# Patient Record
Sex: FEMALE | Race: WHITE | NOT HISPANIC OR LATINO | Employment: OTHER | URBAN - METROPOLITAN AREA
[De-identification: names, ages, dates, MRNs, and addresses within clinical notes are randomized per-mention and may not be internally consistent; named-entity substitution may affect disease eponyms.]

---

## 2018-10-30 ENCOUNTER — HOSPITAL ENCOUNTER (OUTPATIENT)
Dept: OTHER | Facility: HOSPITAL | Age: 73
Discharge: HOME | End: 2018-10-30
Attending: PODIATRIST | Admitting: PODIATRIST

## 2018-10-30 LAB
ANION GAP SERPL CALC-SCNC: 13.8 MMOL/L (ref 10–20)
BASOPHILS # BLD AUTO: 0 10*3/UL (ref 0–0.2)
BASOPHILS NFR BLD AUTO: 0 % (ref 0–2)
BUN SERPL-MCNC: 24 MG/DL (ref 8–20)
BUN/CREAT SERPL: 24 (ref 5.4–26.2)
CALCIUM SERPL-MCNC: 9.4 MG/DL (ref 8.9–10.3)
CHLORIDE SERPL-SCNC: 103 MMOL/L (ref 101–111)
CONV CO2: 29 MMOL/L (ref 22–32)
CREAT UR-MCNC: 1 MG/DL (ref 0.4–1)
DIFFERENTIAL METHOD BLD: (no result)
EOSINOPHIL # BLD AUTO: 0 10*3/UL (ref 0–0.3)
EOSINOPHIL # BLD AUTO: 1 % (ref 0–3)
ERYTHROCYTE [DISTWIDTH] IN BLOOD BY AUTOMATED COUNT: 13.5 % (ref 11.5–14.5)
GLUCOSE SERPL-MCNC: 105 MG/DL (ref 65–99)
HCT VFR BLD AUTO: 39.7 % (ref 35–49)
HGB BLD-MCNC: 13.2 G/DL (ref 12–15)
LYMPHOCYTES # BLD AUTO: 1.6 10*3/UL (ref 0.8–4.8)
LYMPHOCYTES NFR BLD AUTO: 17 % (ref 18–42)
MCH RBC QN AUTO: 27.2 PG (ref 26–32)
MCHC RBC AUTO-ENTMCNC: 33.3 G/DL (ref 32–36)
MCV RBC AUTO: 81.8 FL (ref 80–94)
MONOCYTES # BLD AUTO: 0.7 10*3/UL (ref 0.1–1.3)
MONOCYTES NFR BLD AUTO: 7 % (ref 2–11)
NEUTROPHILS # BLD AUTO: 7.4 10*3/UL (ref 2.3–8.6)
NEUTROPHILS NFR BLD AUTO: 75 % (ref 50–75)
NRBC BLD AUTO-RTO: 0 /100{WBCS}
NRBC/RBC NFR BLD MANUAL: 0 10*3/UL
PLATELET # BLD AUTO: 298 10*3/UL (ref 150–450)
PMV BLD AUTO: 8.2 FL (ref 7.4–10.4)
POTASSIUM SERPL-SCNC: 3.8 MMOL/L (ref 3.6–5.1)
RBC # BLD AUTO: 4.86 10*6/UL (ref 4–5.4)
SODIUM SERPL-SCNC: 142 MMOL/L (ref 136–144)
WBC # BLD AUTO: 9.7 10*3/UL (ref 4.5–11.5)

## 2019-05-10 ENCOUNTER — HOSPITAL ENCOUNTER (OUTPATIENT)
Dept: PREADMISSION TESTING | Facility: HOSPITAL | Age: 74
Discharge: HOME | End: 2019-05-10
Attending: PODIATRIST | Admitting: PODIATRIST

## 2019-05-10 LAB
ANION GAP SERPL CALC-SCNC: 17 MMOL/L (ref 10–20)
BASOPHILS # BLD AUTO: 0 10*3/UL (ref 0–0.2)
BASOPHILS NFR BLD AUTO: 1 % (ref 0–2)
BUN SERPL-MCNC: 17 MG/DL (ref 8–20)
BUN/CREAT SERPL: 18.9 (ref 5.4–26.2)
CALCIUM SERPL-MCNC: 9.4 MG/DL (ref 8.9–10.3)
CHLORIDE SERPL-SCNC: 99 MMOL/L (ref 101–111)
CONV CO2: 27 MMOL/L (ref 22–32)
CREAT UR-MCNC: 0.9 MG/DL (ref 0.4–1)
DIFFERENTIAL METHOD BLD: (no result)
EOSINOPHIL # BLD AUTO: 0.1 10*3/UL (ref 0–0.3)
EOSINOPHIL # BLD AUTO: 2 % (ref 0–3)
ERYTHROCYTE [DISTWIDTH] IN BLOOD BY AUTOMATED COUNT: 13.7 % (ref 11.5–14.5)
GLUCOSE SERPL-MCNC: 96 MG/DL (ref 65–99)
HCT VFR BLD AUTO: 38.3 % (ref 35–49)
HGB BLD-MCNC: 12.8 G/DL (ref 12–15)
LYMPHOCYTES # BLD AUTO: 2.1 10*3/UL (ref 0.8–4.8)
LYMPHOCYTES NFR BLD AUTO: 36 % (ref 18–42)
MCH RBC QN AUTO: 27.9 PG (ref 26–32)
MCHC RBC AUTO-ENTMCNC: 33.3 G/DL (ref 32–36)
MCV RBC AUTO: 83.6 FL (ref 80–94)
MONOCYTES # BLD AUTO: 0.4 10*3/UL (ref 0.1–1.3)
MONOCYTES NFR BLD AUTO: 7 % (ref 2–11)
NEUTROPHILS # BLD AUTO: 3.2 10*3/UL (ref 2.3–8.6)
NEUTROPHILS NFR BLD AUTO: 54 % (ref 50–75)
NRBC BLD AUTO-RTO: 0 /100{WBCS}
NRBC/RBC NFR BLD MANUAL: 0 10*3/UL
PLATELET # BLD AUTO: 234 10*3/UL (ref 150–450)
PMV BLD AUTO: 7.7 FL (ref 7.4–10.4)
POTASSIUM SERPL-SCNC: 4 MMOL/L (ref 3.6–5.1)
RBC # BLD AUTO: 4.58 10*6/UL (ref 4–5.4)
SODIUM SERPL-SCNC: 139 MMOL/L (ref 136–144)
WBC # BLD AUTO: 5.8 10*3/UL (ref 4.5–11.5)

## 2019-09-06 ENCOUNTER — TRANSCRIBE ORDERS (OUTPATIENT)
Dept: ADMINISTRATIVE | Facility: HOSPITAL | Age: 74
End: 2019-09-06

## 2019-09-06 DIAGNOSIS — I70.203 STENOSIS OF ARTERY OF BOTH LOWER EXTREMITIES (HCC): Primary | ICD-10-CM

## 2019-09-10 ENCOUNTER — HOSPITAL ENCOUNTER (OUTPATIENT)
Dept: CARDIOLOGY | Facility: HOSPITAL | Age: 74
Discharge: HOME | End: 2019-09-10
Admitting: PODIATRIST

## 2019-09-10 DIAGNOSIS — I70.203 STENOSIS OF ARTERY OF BOTH LOWER EXTREMITIES (HCC): ICD-10-CM

## 2019-09-10 LAB
BH CV LOWER ARTERIAL LEFT ABI RATIO: 1.18
BH CV LOWER ARTERIAL LEFT DORSALIS PEDIS SYS MAX: 189 MMHG
BH CV LOWER ARTERIAL LEFT GREAT TOE SYS MAX: 112 MMHG
BH CV LOWER ARTERIAL LEFT POST TIBIAL SYS MAX: 199 MMHG
BH CV LOWER ARTERIAL LEFT TBI RATIO: 0.66
BH CV LOWER ARTERIAL RIGHT ABI RATIO: 1.19
BH CV LOWER ARTERIAL RIGHT DORSALIS PEDIS SYS MAX: 192 MMHG
BH CV LOWER ARTERIAL RIGHT GREAT TOE SYS MAX: 101 MMHG
BH CV LOWER ARTERIAL RIGHT POST TIBIAL SYS MAX: 201 MMHG
BH CV LOWER ARTERIAL RIGHT TBI RATIO: 0.6
UPPER ARTERIAL LEFT ARM BRACHIAL SYS MAX: 169 MMHG
UPPER ARTERIAL RIGHT ARM BRACHIAL SYS MAX: 162 MMHG

## 2019-09-10 PROCEDURE — 93922 UPR/L XTREMITY ART 2 LEVELS: CPT

## 2019-09-16 ENCOUNTER — LAB (OUTPATIENT)
Dept: LAB | Facility: HOSPITAL | Age: 74
End: 2019-09-16

## 2019-09-16 ENCOUNTER — TRANSCRIBE ORDERS (OUTPATIENT)
Dept: ADMINISTRATIVE | Facility: HOSPITAL | Age: 74
End: 2019-09-16

## 2019-09-16 DIAGNOSIS — I10 HYPERTENSION, UNSPECIFIED TYPE: ICD-10-CM

## 2019-09-16 DIAGNOSIS — Z01.818 PRE-OP TESTING: ICD-10-CM

## 2019-09-16 DIAGNOSIS — I10 HYPERTENSION, UNSPECIFIED TYPE: Primary | ICD-10-CM

## 2019-09-16 LAB
ANION GAP SERPL CALCULATED.3IONS-SCNC: 16.1 MMOL/L (ref 5–15)
BASOPHILS # BLD AUTO: 0 10*3/MM3 (ref 0–0.2)
BASOPHILS NFR BLD AUTO: 0.8 % (ref 0–1.5)
BUN BLD-MCNC: 29 MG/DL (ref 8–20)
BUN/CREAT SERPL: 26.4 (ref 5.4–26.2)
CALCIUM SPEC-SCNC: 9.4 MG/DL (ref 8.9–10.3)
CHLORIDE SERPL-SCNC: 99 MMOL/L (ref 101–111)
CO2 SERPL-SCNC: 29 MMOL/L (ref 22–32)
CREAT BLD-MCNC: 1.1 MG/DL (ref 0.4–1)
DEPRECATED RDW RBC AUTO: 39.8 FL (ref 37–54)
EOSINOPHIL # BLD AUTO: 0 10*3/MM3 (ref 0–0.4)
EOSINOPHIL NFR BLD AUTO: 0.9 % (ref 0.3–6.2)
ERYTHROCYTE [DISTWIDTH] IN BLOOD BY AUTOMATED COUNT: 13.6 % (ref 12.3–15.4)
GFR SERPL CREATININE-BSD FRML MDRD: 49 ML/MIN/1.73
GLUCOSE BLD-MCNC: 105 MG/DL (ref 65–99)
HCT VFR BLD AUTO: 37.6 % (ref 34–46.6)
HGB BLD-MCNC: 12.9 G/DL (ref 12–15.9)
LYMPHOCYTES # BLD AUTO: 1.9 10*3/MM3 (ref 0.7–3.1)
LYMPHOCYTES NFR BLD AUTO: 38.3 % (ref 19.6–45.3)
MCH RBC QN AUTO: 29.2 PG (ref 26.6–33)
MCHC RBC AUTO-ENTMCNC: 34.3 G/DL (ref 31.5–35.7)
MCV RBC AUTO: 85.1 FL (ref 79–97)
MONOCYTES # BLD AUTO: 0.4 10*3/MM3 (ref 0.1–0.9)
MONOCYTES NFR BLD AUTO: 7.8 % (ref 5–12)
NEUTROPHILS # BLD AUTO: 2.7 10*3/MM3 (ref 1.7–7)
NEUTROPHILS NFR BLD AUTO: 52.2 % (ref 42.7–76)
NRBC BLD AUTO-RTO: 0.1 /100 WBC (ref 0–0.2)
PLATELET # BLD AUTO: 240 10*3/MM3 (ref 140–450)
PMV BLD AUTO: 7.1 FL (ref 6–12)
POTASSIUM BLD-SCNC: 4.1 MMOL/L (ref 3.6–5.1)
RBC # BLD AUTO: 4.42 10*6/MM3 (ref 3.77–5.28)
SODIUM BLD-SCNC: 140 MMOL/L (ref 136–144)
WBC NRBC COR # BLD: 5.1 10*3/MM3 (ref 3.4–10.8)

## 2019-09-16 PROCEDURE — 85025 COMPLETE CBC W/AUTO DIFF WBC: CPT

## 2019-09-16 PROCEDURE — 80048 BASIC METABOLIC PNL TOTAL CA: CPT

## 2019-09-16 PROCEDURE — 36415 COLL VENOUS BLD VENIPUNCTURE: CPT

## 2019-09-17 ENCOUNTER — TRANSCRIBE ORDERS (OUTPATIENT)
Dept: ADMINISTRATIVE | Facility: HOSPITAL | Age: 74
End: 2019-09-17

## 2019-09-17 ENCOUNTER — HOSPITAL ENCOUNTER (OUTPATIENT)
Dept: CARDIOLOGY | Facility: HOSPITAL | Age: 74
Discharge: HOME | End: 2019-09-17
Admitting: PODIATRIST

## 2019-09-17 DIAGNOSIS — I10 ESSENTIAL HYPERTENSION, BENIGN: Primary | ICD-10-CM

## 2019-09-17 DIAGNOSIS — I10 ESSENTIAL HYPERTENSION, BENIGN: ICD-10-CM

## 2019-09-17 PROCEDURE — 93005 ELECTROCARDIOGRAM TRACING: CPT | Performed by: PODIATRIST

## 2019-09-22 PROCEDURE — 93010 ELECTROCARDIOGRAM REPORT: CPT | Performed by: INTERNAL MEDICINE

## 2021-11-26 ENCOUNTER — APPOINTMENT (OUTPATIENT)
Dept: GENERAL RADIOLOGY | Facility: HOSPITAL | Age: 76
End: 2021-11-26

## 2021-11-26 ENCOUNTER — APPOINTMENT (OUTPATIENT)
Dept: CT IMAGING | Facility: HOSPITAL | Age: 76
End: 2021-11-26

## 2021-11-26 ENCOUNTER — HOSPITAL ENCOUNTER (EMERGENCY)
Facility: HOSPITAL | Age: 76
Discharge: HOME | End: 2021-11-26
Attending: EMERGENCY MEDICINE | Admitting: EMERGENCY MEDICINE

## 2021-11-26 VITALS
HEART RATE: 77 BPM | HEIGHT: 64 IN | RESPIRATION RATE: 18 BRPM | SYSTOLIC BLOOD PRESSURE: 181 MMHG | BODY MASS INDEX: 17.35 KG/M2 | WEIGHT: 101.63 LBS | OXYGEN SATURATION: 97 % | DIASTOLIC BLOOD PRESSURE: 78 MMHG | TEMPERATURE: 98.4 F

## 2021-11-26 DIAGNOSIS — S20.229A CONTUSION OF BACK, UNSPECIFIED LATERALITY, INITIAL ENCOUNTER: ICD-10-CM

## 2021-11-26 DIAGNOSIS — S00.03XA CONTUSION OF SCALP, INITIAL ENCOUNTER: ICD-10-CM

## 2021-11-26 DIAGNOSIS — E86.0 DEHYDRATION: ICD-10-CM
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
## 2021-11-26 LAB
ALBUMIN SERPL-MCNC: 4.2 G/DL (ref 3.5–5.2)
ALBUMIN/GLOB SERPL: 1.7 G/DL
ALP SERPL-CCNC: 52 U/L (ref 39–117)
ALT SERPL W P-5'-P-CCNC: 11 U/L (ref 1–33)
ANION GAP SERPL CALCULATED.3IONS-SCNC: 10 MMOL/L (ref 5–15)
AST SERPL-CCNC: 22 U/L (ref 1–32)
BASOPHILS # BLD AUTO: 0 10*3/MM3 (ref 0–0.2)
BASOPHILS NFR BLD AUTO: 0.2 % (ref 0–1.5)
BILIRUB SERPL-MCNC: 0.3 MG/DL (ref 0–1.2)
BILIRUB UR QL STRIP: NEGATIVE
BUN SERPL-MCNC: 32 MG/DL (ref 8–23)
BUN/CREAT SERPL: 30.5 (ref 7–25)
CALCIUM SPEC-SCNC: 8.5 MG/DL (ref 8.6–10.5)
CHLORIDE SERPL-SCNC: 102 MMOL/L (ref 98–107)
CLARITY UR: CLEAR
CO2 SERPL-SCNC: 27 MMOL/L (ref 22–29)
COLOR UR: YELLOW
CREAT SERPL-MCNC: 1.05 MG/DL (ref 0.57–1)
DEPRECATED RDW RBC AUTO: 40.3 FL (ref 37–54)
EOSINOPHIL # BLD AUTO: 0 10*3/MM3 (ref 0–0.4)
EOSINOPHIL NFR BLD AUTO: 0.4 % (ref 0.3–6.2)
ERYTHROCYTE [DISTWIDTH] IN BLOOD BY AUTOMATED COUNT: 13.8 % (ref 12.3–15.4)
GFR SERPL CREATININE-BSD FRML MDRD: 51 ML/MIN/1.73
GLOBULIN UR ELPH-MCNC: 2.5 GM/DL
GLUCOSE SERPL-MCNC: 103 MG/DL (ref 65–99)
GLUCOSE UR STRIP-MCNC: NEGATIVE MG/DL
HCT VFR BLD AUTO: 34.5 % (ref 34–46.6)
HGB BLD-MCNC: 11.6 G/DL (ref 12–15.9)
HGB UR QL STRIP.AUTO: NEGATIVE
KETONES UR QL STRIP: NEGATIVE
LEUKOCYTE ESTERASE UR QL STRIP.AUTO: NEGATIVE
LYMPHOCYTES # BLD AUTO: 1.8 10*3/MM3 (ref 0.7–3.1)
LYMPHOCYTES NFR BLD AUTO: 15.5 % (ref 19.6–45.3)
MCH RBC QN AUTO: 28.4 PG (ref 26.6–33)
MCHC RBC AUTO-ENTMCNC: 33.7 G/DL (ref 31.5–35.7)
MCV RBC AUTO: 84.3 FL (ref 79–97)
MONOCYTES # BLD AUTO: 0.9 10*3/MM3 (ref 0.1–0.9)
MONOCYTES NFR BLD AUTO: 8.2 % (ref 5–12)
NEUTROPHILS NFR BLD AUTO: 75.7 % (ref 42.7–76)
NEUTROPHILS NFR BLD AUTO: 8.7 10*3/MM3 (ref 1.7–7)
NITRITE UR QL STRIP: NEGATIVE
NRBC BLD AUTO-RTO: 0.1 /100 WBC (ref 0–0.2)
PH UR STRIP.AUTO: 6.5 [PH] (ref 5–8)
PLATELET # BLD AUTO: 235 10*3/MM3 (ref 140–450)
PMV BLD AUTO: 7.4 FL (ref 6–12)
POTASSIUM SERPL-SCNC: 4.1 MMOL/L (ref 3.5–5.2)
PROT SERPL-MCNC: 6.7 G/DL (ref 6–8.5)
PROT UR QL STRIP: NEGATIVE
RBC # BLD AUTO: 4.09 10*6/MM3 (ref 3.77–5.28)
SODIUM SERPL-SCNC: 139 MMOL/L (ref 136–145)
SP GR UR STRIP: 1.02 (ref 1–1.03)
UROBILINOGEN UR QL STRIP: NORMAL
WBC NRBC COR # BLD: 11.4 10*3/MM3 (ref 3.4–10.8)

## 2021-11-26 PROCEDURE — 99283 EMERGENCY DEPT VISIT LOW MDM: CPT

## 2021-11-26 PROCEDURE — 72220 X-RAY EXAM SACRUM TAILBONE: CPT

## 2021-11-26 PROCEDURE — 81003 URINALYSIS AUTO W/O SCOPE: CPT | Performed by: EMERGENCY MEDICINE

## 2021-11-26 PROCEDURE — 85025 COMPLETE CBC W/AUTO DIFF WBC: CPT | Performed by: EMERGENCY MEDICINE

## 2021-11-26 PROCEDURE — 72110 X-RAY EXAM L-2 SPINE 4/>VWS: CPT

## 2021-11-26 PROCEDURE — 70450 CT HEAD/BRAIN W/O DYE: CPT

## 2021-11-26 PROCEDURE — 80053 COMPREHEN METABOLIC PANEL: CPT | Performed by: EMERGENCY MEDICINE

## 2021-11-26 RX ORDER — SODIUM CHLORIDE 0.9 % (FLUSH) 0.9 %
10 SYRINGE (ML) INJECTION AS NEEDED
Status: DISCONTINUED | OUTPATIENT
Start: 2021-11-26 | End: 2021-11-26 | Stop reason: HOSPADM

## 2021-11-26 RX ADMIN — SODIUM CHLORIDE 500 ML: 9 INJECTION, SOLUTION INTRAVENOUS at 13:53

## 2022-03-11 ENCOUNTER — APPOINTMENT (OUTPATIENT)
Dept: CT IMAGING | Facility: HOSPITAL | Age: 77
End: 2022-03-11

## 2022-03-11 ENCOUNTER — HOSPITAL ENCOUNTER (EMERGENCY)
Facility: HOSPITAL | Age: 77
Discharge: HOME | End: 2022-03-11
Attending: EMERGENCY MEDICINE | Admitting: EMERGENCY MEDICINE

## 2022-03-11 ENCOUNTER — APPOINTMENT (OUTPATIENT)
Dept: GENERAL RADIOLOGY | Facility: HOSPITAL | Age: 77
End: 2022-03-11

## 2022-03-11 VITALS
HEART RATE: 68 BPM | RESPIRATION RATE: 18 BRPM | BODY MASS INDEX: 20.17 KG/M2 | TEMPERATURE: 98.9 F | SYSTOLIC BLOOD PRESSURE: 168 MMHG | WEIGHT: 102.73 LBS | DIASTOLIC BLOOD PRESSURE: 80 MMHG | OXYGEN SATURATION: 100 % | HEIGHT: 60 IN

## 2022-03-11 DIAGNOSIS — R53.1 WEAKNESS: ICD-10-CM

## 2022-03-11 DIAGNOSIS — R42 DIZZINESS: Primary | ICD-10-CM

## 2022-03-11 LAB
ANION GAP SERPL CALCULATED.3IONS-SCNC: 9 MMOL/L (ref 5–15)
BACTERIA UR QL AUTO: ABNORMAL /HPF
BASOPHILS # BLD AUTO: 0 10*3/MM3 (ref 0–0.2)
BASOPHILS NFR BLD AUTO: 0.4 % (ref 0–1.5)
BILIRUB UR QL STRIP: NEGATIVE
BUN SERPL-MCNC: 31 MG/DL (ref 8–23)
BUN/CREAT SERPL: 33.3 (ref 7–25)
CALCIUM SPEC-SCNC: 8.7 MG/DL (ref 8.6–10.5)
CHLORIDE SERPL-SCNC: 102 MMOL/L (ref 98–107)
CLARITY UR: CLEAR
CO2 SERPL-SCNC: 30 MMOL/L (ref 22–29)
COLOR UR: YELLOW
CREAT SERPL-MCNC: 0.93 MG/DL (ref 0.57–1)
DEPRECATED RDW RBC AUTO: 36.8 FL (ref 37–54)
EGFRCR SERPLBLD CKD-EPI 2021: 63.8 ML/MIN/1.73
EOSINOPHIL # BLD AUTO: 0.2 10*3/MM3 (ref 0–0.4)
EOSINOPHIL NFR BLD AUTO: 3.9 % (ref 0.3–6.2)
ERYTHROCYTE [DISTWIDTH] IN BLOOD BY AUTOMATED COUNT: 13 % (ref 12.3–15.4)
GLUCOSE SERPL-MCNC: 91 MG/DL (ref 65–99)
GLUCOSE UR STRIP-MCNC: NEGATIVE MG/DL
HCT VFR BLD AUTO: 33.6 % (ref 34–46.6)
HGB BLD-MCNC: 11.6 G/DL (ref 12–15.9)
HGB UR QL STRIP.AUTO: NEGATIVE
HYALINE CASTS UR QL AUTO: ABNORMAL /LPF
KETONES UR QL STRIP: NEGATIVE
LEUKOCYTE ESTERASE UR QL STRIP.AUTO: ABNORMAL
LYMPHOCYTES # BLD AUTO: 1.7 10*3/MM3 (ref 0.7–3.1)
LYMPHOCYTES NFR BLD AUTO: 30 % (ref 19.6–45.3)
MCH RBC QN AUTO: 28.2 PG (ref 26.6–33)
MCHC RBC AUTO-ENTMCNC: 34.4 G/DL (ref 31.5–35.7)
MCV RBC AUTO: 82 FL (ref 79–97)
MONOCYTES # BLD AUTO: 0.5 10*3/MM3 (ref 0.1–0.9)
MONOCYTES NFR BLD AUTO: 9.4 % (ref 5–12)
NEUTROPHILS NFR BLD AUTO: 3.3 10*3/MM3 (ref 1.7–7)
NEUTROPHILS NFR BLD AUTO: 56.3 % (ref 42.7–76)
NITRITE UR QL STRIP: NEGATIVE
NRBC BLD AUTO-RTO: 0 /100 WBC (ref 0–0.2)
PH UR STRIP.AUTO: 6.5 [PH] (ref 5–8)
PLATELET # BLD AUTO: 221 10*3/MM3 (ref 140–450)
PMV BLD AUTO: 7 FL (ref 6–12)
POTASSIUM SERPL-SCNC: 3.9 MMOL/L (ref 3.5–5.2)
PROT UR QL STRIP: NEGATIVE
RBC # BLD AUTO: 4.09 10*6/MM3 (ref 3.77–5.28)
RBC # UR STRIP: ABNORMAL /HPF
REF LAB TEST METHOD: ABNORMAL
SODIUM SERPL-SCNC: 141 MMOL/L (ref 136–145)
SP GR UR STRIP: 1.02 (ref 1–1.03)
SQUAMOUS #/AREA URNS HPF: ABNORMAL /HPF
UROBILINOGEN UR QL STRIP: ABNORMAL
WBC # UR STRIP: ABNORMAL /HPF
WBC NRBC COR # BLD: 5.8 10*3/MM3 (ref 3.4–10.8)

## 2022-03-11 PROCEDURE — P9612 CATHETERIZE FOR URINE SPEC: HCPCS

## 2022-03-11 PROCEDURE — 93005 ELECTROCARDIOGRAM TRACING: CPT | Performed by: EMERGENCY MEDICINE

## 2022-03-11 PROCEDURE — 70450 CT HEAD/BRAIN W/O DYE: CPT

## 2022-03-11 PROCEDURE — 81001 URINALYSIS AUTO W/SCOPE: CPT | Performed by: EMERGENCY MEDICINE

## 2022-03-11 PROCEDURE — 80048 BASIC METABOLIC PNL TOTAL CA: CPT | Performed by: EMERGENCY MEDICINE

## 2022-03-11 PROCEDURE — 71045 X-RAY EXAM CHEST 1 VIEW: CPT

## 2022-03-11 PROCEDURE — 99283 EMERGENCY DEPT VISIT LOW MDM: CPT

## 2022-03-11 PROCEDURE — 85025 COMPLETE CBC W/AUTO DIFF WBC: CPT | Performed by: EMERGENCY MEDICINE

## 2022-03-11 RX ORDER — MULTIPLE VITAMINS W/ MINERALS TAB 9MG-400MCG
1 TAB ORAL DAILY
Status: ON HOLD | COMMUNITY
End: 2022-05-10

## 2022-03-11 RX ORDER — MONTELUKAST SODIUM 10 MG/1
10 TABLET ORAL NIGHTLY
Status: ON HOLD | COMMUNITY
End: 2022-09-23

## 2022-03-11 RX ORDER — DICLOFENAC SODIUM AND MISOPROSTOL 50; 200 MG/1; UG/1
1 TABLET, DELAYED RELEASE ORAL 2 TIMES DAILY
Status: ON HOLD | COMMUNITY
End: 2022-05-10

## 2022-03-11 RX ORDER — TRIAMTERENE AND HYDROCHLOROTHIAZIDE 37.5; 25 MG/1; MG/1
1 CAPSULE ORAL EVERY MORNING
Status: ON HOLD | COMMUNITY
End: 2022-05-10

## 2022-03-11 RX ORDER — SODIUM CHLORIDE 0.9 % (FLUSH) 0.9 %
10 SYRINGE (ML) INJECTION AS NEEDED
Status: DISCONTINUED | OUTPATIENT
Start: 2022-03-11 | End: 2022-03-11 | Stop reason: HOSPADM

## 2022-03-11 RX ORDER — MELATONIN
1000 DAILY
Status: ON HOLD | COMMUNITY
End: 2022-05-10

## 2022-03-11 RX ORDER — CITALOPRAM 20 MG/1
20 TABLET ORAL DAILY
Status: ON HOLD | COMMUNITY
End: 2022-07-04

## 2022-03-11 NOTE — ED PROVIDER NOTES
Subjective   Patient is a 76-year-old female complaining of weakness and mild dizziness for the past 48 hours.  She denies headache chest pain shortness of breath or other complaint.  States her symptoms are mild but constant.          Review of Systems  Negative for headache ears throat cough fever chest pain shortness of breath abdominal pain Irvin diarrhea dysuria is weight loss or other complaint  Past Medical History:   Diagnosis Date   • Hypertension        Allergies   Allergen Reactions   • Penicillins Hives       History reviewed. No pertinent surgical history.    History reviewed. No pertinent family history.    Social History     Socioeconomic History   • Marital status:    Tobacco Use   • Smoking status: Never Smoker   Vaping Use   • Vaping Use: Never used   Substance and Sexual Activity   • Alcohol use: Not Currently   • Drug use: Never   • Sexual activity: Defer           Objective   Physical Exam  Neurologic exam is nonfocal.  HEENT exam shows TMs to be clear.  Oropharynx clear moist but sclera nonicteric.  Neck has no adenopathy JVD or bruits.  Lungs are clear.  Heart has regular rhythm without murmur or gallop.  Chest is nontender.  Abdomen is soft nontender.  Extremity exam has no cyanosis or edema.  Procedures       My EKG interpretation shows normal sinus rhythm at a rate of 71 with no acute ST change.  This is unchanged from previous tracing 9/17/2019.    ED Course      Results for orders placed or performed during the hospital encounter of 03/11/22   Basic Metabolic Panel    Specimen: Blood   Result Value Ref Range    Glucose 91 65 - 99 mg/dL    BUN 31 (H) 8 - 23 mg/dL    Creatinine 0.93 0.57 - 1.00 mg/dL    Sodium 141 136 - 145 mmol/L    Potassium 3.9 3.5 - 5.2 mmol/L    Chloride 102 98 - 107 mmol/L    CO2 30.0 (H) 22.0 - 29.0 mmol/L    Calcium 8.7 8.6 - 10.5 mg/dL    BUN/Creatinine Ratio 33.3 (H) 7.0 - 25.0    Anion Gap 9.0 5.0 - 15.0 mmol/L    eGFR 63.8 >60.0 mL/min/1.73   Urinalysis  With Microscopic If Indicated (No Culture) - Urine, Catheter    Specimen: Urine, Catheter   Result Value Ref Range    Color, UA Yellow Yellow, Straw    Appearance, UA Clear Clear    pH, UA 6.5 5.0 - 8.0    Specific Gravity, UA 1.018 1.005 - 1.030    Glucose, UA Negative Negative    Ketones, UA Negative Negative    Bilirubin, UA Negative Negative    Blood, UA Negative Negative    Protein, UA Negative Negative    Leuk Esterase, UA Trace (A) Negative    Nitrite, UA Negative Negative    Urobilinogen, UA 0.2 E.U./dL 0.2 - 1.0 E.U./dL   CBC Auto Differential    Specimen: Blood   Result Value Ref Range    WBC 5.80 3.40 - 10.80 10*3/mm3    RBC 4.09 3.77 - 5.28 10*6/mm3    Hemoglobin 11.6 (L) 12.0 - 15.9 g/dL    Hematocrit 33.6 (L) 34.0 - 46.6 %    MCV 82.0 79.0 - 97.0 fL    MCH 28.2 26.6 - 33.0 pg    MCHC 34.4 31.5 - 35.7 g/dL    RDW 13.0 12.3 - 15.4 %    RDW-SD 36.8 (L) 37.0 - 54.0 fl    MPV 7.0 6.0 - 12.0 fL    Platelets 221 140 - 450 10*3/mm3    Neutrophil % 56.3 42.7 - 76.0 %    Lymphocyte % 30.0 19.6 - 45.3 %    Monocyte % 9.4 5.0 - 12.0 %    Eosinophil % 3.9 0.3 - 6.2 %    Basophil % 0.4 0.0 - 1.5 %    Neutrophils, Absolute 3.30 1.70 - 7.00 10*3/mm3    Lymphocytes, Absolute 1.70 0.70 - 3.10 10*3/mm3    Monocytes, Absolute 0.50 0.10 - 0.90 10*3/mm3    Eosinophils, Absolute 0.20 0.00 - 0.40 10*3/mm3    Basophils, Absolute 0.00 0.00 - 0.20 10*3/mm3    nRBC 0.0 0.0 - 0.2 /100 WBC   Urinalysis, Microscopic Only - Urine, Catheter    Specimen: Urine, Catheter   Result Value Ref Range    RBC, UA 0-2 (A) None Seen /HPF    WBC, UA 3-5 (A) None Seen /HPF    Bacteria, UA None Seen None Seen /HPF    Squamous Epithelial Cells, UA 0-2 None Seen, 0-2 /HPF    Hyaline Casts, UA 3-6 None Seen /LPF    Methodology Automated Microscopy    ECG 12 Lead   Result Value Ref Range    QT Interval 400 ms     CT Head Without Contrast    Result Date: 3/11/2022  1. No acute abnormality is identified in the brain. 2. There is extensive chronic white  matter disease, favored to be due to chronic small vessel ischemia.. There is mild cerebral and cerebellar atrophy.  Electronically Signed By-Margarita Newman MD On:3/11/2022 5:43 PM This report was finalized on [id number] by  Margarita Newman MD.    XR Chest 1 View    Result Date: 3/11/2022  No acute chest finding.  Electronically Signed By-Neha Marley MD On:3/11/2022 5:05 PM This report was finalized on [id number] by  Neha Marley MD.                                               MDM  Number of Diagnoses or Management Options  Diagnosis management comments: Patient is benign physical exam with no focal neurologic deficits.  CT scan shows no intracranial abnormality occluding stroke bleeding or aneurysm.  Metabolic panel is at baseline.  There is no evidence of dehydration.  Patient is no evidence of acute infectious process.  She was at her baseline on reexamination.  She will be discharged to follow with MD for further outpatient evaluation as needed.       Amount and/or Complexity of Data Reviewed  Clinical lab tests: reviewed  Tests in the radiology section of CPT®: reviewed    Risk of Complications, Morbidity, and/or Mortality  Presenting problems: high  Diagnostic procedures: high  Management options: high    Patient Progress  Patient progress: stable      Final diagnoses:   Dizziness   Weakness       ED Disposition  ED Disposition     ED Disposition   Discharge    Condition   Stable    Comment   --             No follow-up provider specified.       Medication List      No changes were made to your prescriptions during this visit.          Elian Charles MD  03/11/22 1929

## 2022-03-11 NOTE — ED TRIAGE NOTES
Per pt and son reports pt has had increased confusion for past few days, strong odor with urine and pt reports general weakness. Pt A&O x1.

## 2022-03-15 LAB — QT INTERVAL: 400 MS

## 2022-05-06 ENCOUNTER — APPOINTMENT (OUTPATIENT)
Dept: GENERAL RADIOLOGY | Facility: HOSPITAL | Age: 77
End: 2022-05-06

## 2022-05-06 ENCOUNTER — APPOINTMENT (OUTPATIENT)
Dept: CT IMAGING | Facility: HOSPITAL | Age: 77
End: 2022-05-06

## 2022-05-06 ENCOUNTER — HOSPITAL ENCOUNTER (INPATIENT)
Facility: HOSPITAL | Age: 77
LOS: 1 days | Discharge: NURSING, REHAB OR LONG-TERM CARE | End: 2022-05-11
Attending: EMERGENCY MEDICINE | Admitting: INTERNAL MEDICINE

## 2022-05-06 DIAGNOSIS — M25.561 ACUTE PAIN OF BOTH KNEES: ICD-10-CM
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
## 2022-05-06 DIAGNOSIS — M25.562 ACUTE PAIN OF BOTH KNEES: ICD-10-CM

## 2022-05-06 DIAGNOSIS — S09.90XA INJURY OF HEAD, INITIAL ENCOUNTER: ICD-10-CM

## 2022-05-06 DIAGNOSIS — M25.552 LEFT HIP PAIN: ICD-10-CM

## 2022-05-06 DIAGNOSIS — R26.89 BALANCE PROBLEM: ICD-10-CM

## 2022-05-06 DIAGNOSIS — R53.1 GENERALIZED WEAKNESS: ICD-10-CM

## 2022-05-06 LAB
ALBUMIN SERPL-MCNC: 3.9 G/DL (ref 3.5–5.2)
ALBUMIN/GLOB SERPL: 1.4 G/DL
ALP SERPL-CCNC: 59 U/L (ref 39–117)
ALT SERPL W P-5'-P-CCNC: 7 U/L (ref 1–33)
AMMONIA BLD-SCNC: 13 UMOL/L (ref 11–51)
ANION GAP SERPL CALCULATED.3IONS-SCNC: 9 MMOL/L (ref 5–15)
AST SERPL-CCNC: 15 U/L (ref 1–32)
B PARAPERT DNA SPEC QL NAA+PROBE: NOT DETECTED
B PERT DNA SPEC QL NAA+PROBE: NOT DETECTED
BACTERIA UR QL AUTO: ABNORMAL /HPF
BASOPHILS # BLD AUTO: 0 10*3/MM3 (ref 0–0.2)
BASOPHILS NFR BLD AUTO: 0.7 % (ref 0–1.5)
BILIRUB SERPL-MCNC: 0.3 MG/DL (ref 0–1.2)
BILIRUB UR QL STRIP: NEGATIVE
BUN SERPL-MCNC: 24 MG/DL (ref 8–23)
BUN/CREAT SERPL: 23.8 (ref 7–25)
C PNEUM DNA NPH QL NAA+NON-PROBE: NOT DETECTED
CALCIUM SPEC-SCNC: 9.1 MG/DL (ref 8.6–10.5)
CHLORIDE SERPL-SCNC: 98 MMOL/L (ref 98–107)
CLARITY UR: CLEAR
CO2 SERPL-SCNC: 29 MMOL/L (ref 22–29)
COLOR UR: YELLOW
CREAT SERPL-MCNC: 1.01 MG/DL (ref 0.57–1)
DEPRECATED RDW RBC AUTO: 38.9 FL (ref 37–54)
EGFRCR SERPLBLD CKD-EPI 2021: 57.8 ML/MIN/1.73
EOSINOPHIL # BLD AUTO: 0.1 10*3/MM3 (ref 0–0.4)
EOSINOPHIL NFR BLD AUTO: 1.5 % (ref 0.3–6.2)
ERYTHROCYTE [DISTWIDTH] IN BLOOD BY AUTOMATED COUNT: 14 % (ref 12.3–15.4)
FLUAV SUBTYP SPEC NAA+PROBE: NOT DETECTED
FLUBV RNA ISLT QL NAA+PROBE: NOT DETECTED
GLOBULIN UR ELPH-MCNC: 2.7 GM/DL
GLUCOSE BLDC GLUCOMTR-MCNC: 100 MG/DL (ref 70–105)
GLUCOSE SERPL-MCNC: 101 MG/DL (ref 65–99)
GLUCOSE UR STRIP-MCNC: NEGATIVE MG/DL
HADV DNA SPEC NAA+PROBE: NOT DETECTED
HCOV 229E RNA SPEC QL NAA+PROBE: NOT DETECTED
HCOV HKU1 RNA SPEC QL NAA+PROBE: NOT DETECTED
HCOV NL63 RNA SPEC QL NAA+PROBE: NOT DETECTED
HCOV OC43 RNA SPEC QL NAA+PROBE: NOT DETECTED
HCT VFR BLD AUTO: 33.4 % (ref 34–46.6)
HGB BLD-MCNC: 11 G/DL (ref 12–15.9)
HGB UR QL STRIP.AUTO: NEGATIVE
HMPV RNA NPH QL NAA+NON-PROBE: NOT DETECTED
HPIV1 RNA ISLT QL NAA+PROBE: NOT DETECTED
HPIV2 RNA SPEC QL NAA+PROBE: NOT DETECTED
HPIV3 RNA NPH QL NAA+PROBE: NOT DETECTED
HPIV4 P GENE NPH QL NAA+PROBE: NOT DETECTED
HYALINE CASTS UR QL AUTO: ABNORMAL /LPF
KETONES UR QL STRIP: NEGATIVE
LEUKOCYTE ESTERASE UR QL STRIP.AUTO: ABNORMAL
LYMPHOCYTES # BLD AUTO: 1.6 10*3/MM3 (ref 0.7–3.1)
LYMPHOCYTES NFR BLD AUTO: 29.4 % (ref 19.6–45.3)
M PNEUMO IGG SER IA-ACNC: NOT DETECTED
MAGNESIUM SERPL-MCNC: 1.7 MG/DL (ref 1.6–2.4)
MCH RBC QN AUTO: 26.6 PG (ref 26.6–33)
MCHC RBC AUTO-ENTMCNC: 32.9 G/DL (ref 31.5–35.7)
MCV RBC AUTO: 80.8 FL (ref 79–97)
MONOCYTES # BLD AUTO: 0.4 10*3/MM3 (ref 0.1–0.9)
MONOCYTES NFR BLD AUTO: 8.3 % (ref 5–12)
NEUTROPHILS NFR BLD AUTO: 3.2 10*3/MM3 (ref 1.7–7)
NEUTROPHILS NFR BLD AUTO: 60.1 % (ref 42.7–76)
NITRITE UR QL STRIP: NEGATIVE
NRBC BLD AUTO-RTO: 0 /100 WBC (ref 0–0.2)
PH UR STRIP.AUTO: 7.5 [PH] (ref 5–8)
PLATELET # BLD AUTO: 261 10*3/MM3 (ref 140–450)
PMV BLD AUTO: 7.3 FL (ref 6–12)
POTASSIUM SERPL-SCNC: 3.8 MMOL/L (ref 3.5–5.2)
PROT SERPL-MCNC: 6.6 G/DL (ref 6–8.5)
PROT UR QL STRIP: NEGATIVE
RBC # BLD AUTO: 4.13 10*6/MM3 (ref 3.77–5.28)
RBC # UR STRIP: ABNORMAL /HPF
REF LAB TEST METHOD: ABNORMAL
RHINOVIRUS RNA SPEC NAA+PROBE: NOT DETECTED
RSV RNA NPH QL NAA+NON-PROBE: NOT DETECTED
SARS-COV-2 RNA NPH QL NAA+NON-PROBE: NOT DETECTED
SODIUM SERPL-SCNC: 136 MMOL/L (ref 136–145)
SP GR UR STRIP: 1.01 (ref 1–1.03)
SQUAMOUS #/AREA URNS HPF: ABNORMAL /HPF
TROPONIN T SERPL-MCNC: <0.01 NG/ML (ref 0–0.03)
TSH SERPL DL<=0.05 MIU/L-ACNC: 0.82 UIU/ML (ref 0.27–4.2)
UROBILINOGEN UR QL STRIP: ABNORMAL
WBC # UR STRIP: ABNORMAL /HPF
WBC NRBC COR # BLD: 5.3 10*3/MM3 (ref 3.4–10.8)

## 2022-05-06 PROCEDURE — 84484 ASSAY OF TROPONIN QUANT: CPT | Performed by: PHYSICIAN ASSISTANT

## 2022-05-06 PROCEDURE — 99284 EMERGENCY DEPT VISIT MOD MDM: CPT

## 2022-05-06 PROCEDURE — 80053 COMPREHEN METABOLIC PANEL: CPT | Performed by: PHYSICIAN ASSISTANT

## 2022-05-06 PROCEDURE — 93005 ELECTROCARDIOGRAM TRACING: CPT | Performed by: PHYSICIAN ASSISTANT

## 2022-05-06 PROCEDURE — 85025 COMPLETE CBC W/AUTO DIFF WBC: CPT | Performed by: PHYSICIAN ASSISTANT

## 2022-05-06 PROCEDURE — 84443 ASSAY THYROID STIM HORMONE: CPT | Performed by: PHYSICIAN ASSISTANT

## 2022-05-06 PROCEDURE — 70450 CT HEAD/BRAIN W/O DYE: CPT

## 2022-05-06 PROCEDURE — G0378 HOSPITAL OBSERVATION PER HR: HCPCS

## 2022-05-06 PROCEDURE — 81001 URINALYSIS AUTO W/SCOPE: CPT | Performed by: PHYSICIAN ASSISTANT

## 2022-05-06 PROCEDURE — 73562 X-RAY EXAM OF KNEE 3: CPT

## 2022-05-06 PROCEDURE — 71045 X-RAY EXAM CHEST 1 VIEW: CPT

## 2022-05-06 PROCEDURE — 82140 ASSAY OF AMMONIA: CPT | Performed by: PHYSICIAN ASSISTANT

## 2022-05-06 PROCEDURE — P9612 CATHETERIZE FOR URINE SPEC: HCPCS

## 2022-05-06 PROCEDURE — 0202U NFCT DS 22 TRGT SARS-COV-2: CPT | Performed by: PHYSICIAN ASSISTANT

## 2022-05-06 PROCEDURE — 83735 ASSAY OF MAGNESIUM: CPT | Performed by: PHYSICIAN ASSISTANT

## 2022-05-06 PROCEDURE — 82962 GLUCOSE BLOOD TEST: CPT

## 2022-05-06 PROCEDURE — 73502 X-RAY EXAM HIP UNI 2-3 VIEWS: CPT

## 2022-05-06 PROCEDURE — 99222 1ST HOSP IP/OBS MODERATE 55: CPT | Performed by: FAMILY MEDICINE

## 2022-05-06 RX ORDER — MONTELUKAST SODIUM 10 MG/1
10 TABLET ORAL NIGHTLY
Status: DISCONTINUED | OUTPATIENT
Start: 2022-05-06 | End: 2022-05-11 | Stop reason: HOSPADM

## 2022-05-06 RX ORDER — MELATONIN
1000 DAILY
Status: DISCONTINUED | OUTPATIENT
Start: 2022-05-07 | End: 2022-05-11 | Stop reason: HOSPADM

## 2022-05-06 RX ORDER — ENOXAPARIN SODIUM 100 MG/ML
40 INJECTION SUBCUTANEOUS EVERY 24 HOURS
Status: DISCONTINUED | OUTPATIENT
Start: 2022-05-06 | End: 2022-05-07

## 2022-05-06 RX ORDER — CITALOPRAM 20 MG/1
40 TABLET ORAL DAILY
Status: DISCONTINUED | OUTPATIENT
Start: 2022-05-07 | End: 2022-05-11 | Stop reason: HOSPADM

## 2022-05-06 RX ORDER — TRIAMTERENE AND HYDROCHLOROTHIAZIDE 37.5; 25 MG/1; MG/1
1 TABLET ORAL DAILY
Status: DISCONTINUED | OUTPATIENT
Start: 2022-05-07 | End: 2022-05-11

## 2022-05-06 RX ORDER — CELECOXIB 100 MG/1
100 CAPSULE ORAL DAILY
Status: DISCONTINUED | OUTPATIENT
Start: 2022-05-07 | End: 2022-05-11

## 2022-05-06 RX ORDER — HYDRALAZINE HYDROCHLORIDE 20 MG/ML
5 INJECTION INTRAMUSCULAR; INTRAVENOUS EVERY 8 HOURS PRN
Status: DISCONTINUED | OUTPATIENT
Start: 2022-05-06 | End: 2022-05-11 | Stop reason: HOSPADM

## 2022-05-06 RX ORDER — SODIUM CHLORIDE 0.9 % (FLUSH) 0.9 %
10 SYRINGE (ML) INJECTION AS NEEDED
Status: DISCONTINUED | OUTPATIENT
Start: 2022-05-06 | End: 2022-05-11 | Stop reason: HOSPADM

## 2022-05-06 NOTE — ED PROVIDER NOTES
Subjective       Patient is a 76-year-old female comes in complaining of fall that occurred the night before last.  Patient and family at bedside report that she has been generally weaker in the last few days and somewhat unstable on her feet because of this.  Patient denies any syncopal episode or loss of consciousness.  Patient was seen by physical therapy this morning who was with home health who was concerned about the patient's goose egg appearance to the back of her head and was concerned that patient was not acting baseline for her.  Family at baseline states that she has had intermittent confusion the last few days but states that they believe her to be at baseline currently.  Patient denies any urinary symptoms, abdominal pain, chest pain, congestion, shortness of breath, headache or focal weakness.  Patient does report some bilateral knee pain and states she thinks she fell onto her knees into her left hip.  Patient otherwise denied any other pain.        Review of Systems   Constitutional: Negative for chills, fatigue and fever.   HENT: Negative for congestion, sore throat, tinnitus and trouble swallowing.    Eyes: Negative for photophobia, discharge and visual disturbance.   Respiratory: Negative for cough, shortness of breath and wheezing.    Cardiovascular: Negative for chest pain, palpitations and leg swelling.   Gastrointestinal: Negative for abdominal pain, diarrhea, nausea and vomiting.   Genitourinary: Negative for dysuria, flank pain, frequency and urgency.   Musculoskeletal: Negative for arthralgias and myalgias.        Fall, head injury.   Skin: Negative for rash.   Neurological: Negative for dizziness, syncope, speech difficulty, weakness, light-headedness, numbness and headaches.   Psychiatric/Behavioral: Negative for confusion.       Past Medical History:   Diagnosis Date   • Hypertension        Allergies   Allergen Reactions   • Penicillins Hives       No past surgical history on  file.    No family history on file.    Social History     Socioeconomic History   • Marital status:    Tobacco Use   • Smoking status: Never Smoker   Vaping Use   • Vaping Use: Never used   Substance and Sexual Activity   • Alcohol use: Not Currently   • Drug use: Never   • Sexual activity: Defer           Objective   Physical Exam  Vitals and nursing note reviewed.   Constitutional:       General: She is not in acute distress.     Appearance: She is well-developed. She is not diaphoretic.   HENT:      Head: Normocephalic and atraumatic.      Right Ear: External ear normal.      Left Ear: External ear normal.      Nose: Nose normal.      Mouth/Throat:      Pharynx: No oropharyngeal exudate.   Eyes:      Extraocular Movements: Extraocular movements intact.      Conjunctiva/sclera: Conjunctivae normal.      Pupils: Pupils are equal, round, and reactive to light.   Cardiovascular:      Rate and Rhythm: Normal rate and regular rhythm.      Pulses: Normal pulses.      Heart sounds: Normal heart sounds.      Comments: S1, S2 audible.  Pulmonary:      Effort: Pulmonary effort is normal. No respiratory distress.      Breath sounds: Normal breath sounds. No wheezing, rhonchi or rales.      Comments: On room air.  Abdominal:      General: Bowel sounds are normal. There is no distension.      Palpations: Abdomen is soft.      Tenderness: There is no abdominal tenderness.   Musculoskeletal:         General: No tenderness or deformity. Normal range of motion.      Cervical back: Normal range of motion.      Right lower leg: No edema.      Left lower leg: No edema.      Comments: Generalized weakness   Skin:     General: Skin is warm.      Capillary Refill: Capillary refill takes less than 2 seconds.      Findings: No erythema or rash.   Neurological:      General: No focal deficit present.      Mental Status: She is alert and oriented to person, place, and time.      Cranial Nerves: No cranial nerve deficit.      Sensory:  "No sensory deficit.      Motor: No weakness.      Comments: Cranial nerves II through XII intact.    Motor: 5+ upper extremity lower extremity bilaterally, flexors and extensors symmetric.    Sensation: Grossly intact to fine touch upper extremity and lower extremity bilaterally symmetric.    Cerebellar: FTN bilaterally.  No tremor noted.    Tone: Normal bulk and tone in upper and lower extremities.  No atrophy noted.     Psychiatric:         Mood and Affect: Mood normal.         Behavior: Behavior normal.         Procedures           ED Course  ED Course as of 05/07/22 0133   Fri May 06, 2022   2204 Awaiting callback from hospitalist team, Dr. Decker [RL]      ED Course User Index  [RL] Ashu Armenta PA      BP (!) 196/82 (BP Location: Right arm, Patient Position: Lying)   Pulse 68   Temp 98.2 °F (36.8 °C) (Oral)   Resp 18   Ht 162.6 cm (64\")   Wt 42 kg (92 lb 8 oz)   SpO2 96%   BMI 15.88 kg/m²   Labs Reviewed   COMPREHENSIVE METABOLIC PANEL - Abnormal; Notable for the following components:       Result Value    Glucose 101 (*)     BUN 24 (*)     Creatinine 1.01 (*)     eGFR 57.8 (*)     All other components within normal limits    Narrative:     GFR Normal >60  Chronic Kidney Disease <60  Kidney Failure <15     URINALYSIS W/ CULTURE IF INDICATED - Abnormal; Notable for the following components:    Leuk Esterase, UA Trace (*)     All other components within normal limits   CBC WITH AUTO DIFFERENTIAL - Abnormal; Notable for the following components:    Hemoglobin 11.0 (*)     Hematocrit 33.4 (*)     All other components within normal limits   URINALYSIS, MICROSCOPIC ONLY - Abnormal; Notable for the following components:    WBC, UA 3-5 (*)     All other components within normal limits   RESPIRATORY PANEL PCR W/ COVID-19 (SARS-COV-2) [facility] IN-HOUSE, NP SWAB IN UTM/VTP, 3-4 HR TAT - Normal    Narrative:     In the setting of a positive respiratory panel with a viral infection PLUS a negative " procalcitonin without other underlying concern for bacterial infection, consider observing off antibiotics or discontinuation of antibiotics and continue supportive care. If the respiratory panel is positive for atypical bacterial infection (Bordetella pertussis, Chlamydophila pneumoniae, or Mycoplasma pneumoniae), consider antibiotic de-escalation to target atypical bacterial infection.   TROPONIN (IN-HOUSE) - Normal    Narrative:     Troponin T Reference Range:  <= 0.03 ng/mL-   Negative for AMI  >0.03 ng/mL-     Abnormal for myocardial necrosis.  Clinicians would have to utilize clinical acumen, EKG, Troponin and serial changes to determine if it is an Acute Myocardial Infarction or myocardial injury due to an underlying chronic condition.       Results may be falsely decreased if patient taking Biotin.     TSH - Normal   MAGNESIUM - Normal   AMMONIA - Normal   POCT GLUCOSE FINGERSTICK - Normal   POCT GLUCOSE FINGERSTICK   CBC AND DIFFERENTIAL    Narrative:     The following orders were created for panel order CBC & Differential.  Procedure                               Abnormality         Status                     ---------                               -----------         ------                     CBC Auto Differential[[id number]]        Abnormal            Final result                 Please view results for these tests on the individual orders.     CT Head Without Contrast    Result Date: 5/6/2022  No acute intracranial abnormality. Volume loss and moderate amount of low-density periventricular subcortical white matter consistent chronic small vessel ischemic change. This is unchanged. Brain MRI is more sensitive to evaluate for acute or subacute infarcts and to evaluate for intracranial metastatic disease.  Electronically Signed By-Griselda Richards MD On:5/6/2022 8:21 PM This report was finalized on [id number] by  Griselda Richards MD.    XR Knee 3 View Bilateral    Result Date: 5/6/2022  No fractures or  "dislocations of either knee. Osteopenia. Mild tricompartment joint space narrowing.  Electronically Signed By-Griselda Richards MD On:5/6/2022 7:59 PM This report was finalized on [id number] by  Griselda Richards MD.    XR Chest 1 View    Result Date: 5/6/2022  No acute cardiopulmonary abnormality.  Electronically Signed By-Griselda Richards MD On:5/6/2022 8:01 PM This report was finalized on [id number] by  Griselda Richards MD.    XR Hip With or Without Pelvis 2 - 3 View Left    Result Date: 5/6/2022  Osteopenia limits evaluation. No fractures are identified. Mild arthritis of both hips. If difficulty weightbearing or continued pain MRI of the pelvis is recommended to evaluate for radiographically occult injury.  Electronically Signed By-Griselda Richards MD On:5/6/2022 7:58 PM This report was finalized on [id number] by  Griselda Richards MD.                                               MDM     Chart review:    EKG: EKG reviewed by myself interpreted by , shows sinus rhythm 70 bpm, no ST elevation apparent, similar to previous study.    Imaging: See above  Labs: Respiratory viral panel with COVID-19 swab negative.  CBC and CMP largely unremarkable.  UA shows trace leukocyte Estrace and 3-5 WBCs but no bacteria seen.  TSH normal.  Initial troponin negative.  Magnesium normal.  Vitals:  BP (!) 196/82 (BP Location: Right arm, Patient Position: Lying)   Pulse 68   Temp 98.2 °F (36.8 °C) (Oral)   Resp 18   Ht 162.6 cm (64\")   Wt 42 kg (92 lb 8 oz)   SpO2 96%   BMI 15.88 kg/m²     Medications given:    Medications   sodium chloride 0.9 % flush 10 mL (has no administration in time range)   cholecalciferol (VITAMIN D3) tablet 1,000 Units (has no administration in time range)   citalopram (CeleXA) tablet 40 mg (has no administration in time range)   triamterene-hydrochlorothiazide (MAXZIDE-25) 37.5-25 MG per tablet 1 tablet (has no administration in time range)   celecoxib (CeleBREX) capsule 100 mg (has no administration in time " range)   montelukast (SINGULAIR) tablet 10 mg (10 mg Oral Not Given 5/7/22 0107)   hydrALAZINE (APRESOLINE) injection 5 mg (5 mg Intravenous Given 5/7/22 0059)   Enoxaparin Sodium (LOVENOX) syringe 40 mg (40 mg Subcutaneous Not Given 5/7/22 0106)       Procedures:    MDM: Patient is a 76-year-old female who comes in complaining of fall 2 nights ago.  Respiratory viral panel with COVID-19 swab negative.  CBC and CMP largely unremarkable.  UA shows trace leukocyte Estrace and 3-5 WBCs but no bacteria seen.  TSH normal.  Initial troponin negative.  Magnesium normal.  Chest x-ray unremarkable for acute findings.  X-ray of bilateral knees unremarkable for acute findings.  X-ray left hip unremarkable for acute findings.  CT head shows no acute findings.  Patient had no focal findings on exam consistent with stroke.  Patient complaining of generalized weakness and issues with falls recently.  I am concerned for failure to thrive and do mobility issues.  I spoke with patient and family at bedside at length and agreed with plan for admission to hospital.  Spoke with Dr. Cobian, who accepted patient behalf of hospitalist team for admission to hospital further work-up and treatment.  Results and plan discussed with patient and is agreeable with plan.    Final diagnoses:   Fall, initial encounter   Generalized weakness   Injury of head, initial encounter   Acute pain of both knees   Left hip pain   Balance problem       ED Disposition  ED Disposition     ED Disposition   Decision to Admit    Condition   --    Comment   Level of Care: Observation Unit [28]   Diagnosis: Fall, initial encounter [[id number]]   Admitting Physician: TANA COBIAN [[id number]]   Attending Physician: TANA COBIAN [[id number]]               No follow-up provider specified.       Medication List      No changes were made to your prescriptions during this visit.          Ashu Armenta PA  05/07/22 0133

## 2022-05-07 PROCEDURE — G0378 HOSPITAL OBSERVATION PER HR: HCPCS

## 2022-05-07 PROCEDURE — 25010000002 HEPARIN (PORCINE) PER 1000 UNITS: Performed by: HOSPITALIST

## 2022-05-07 PROCEDURE — 25010000002 HYDRALAZINE PER 20 MG: Performed by: FAMILY MEDICINE

## 2022-05-07 PROCEDURE — 99232 SBSQ HOSP IP/OBS MODERATE 35: CPT | Performed by: HOSPITALIST

## 2022-05-07 RX ORDER — ACETAMINOPHEN 325 MG/1
650 TABLET ORAL EVERY 6 HOURS PRN
Status: DISCONTINUED | OUTPATIENT
Start: 2022-05-07 | End: 2022-05-11 | Stop reason: HOSPADM

## 2022-05-07 RX ORDER — METOPROLOL TARTRATE 50 MG/1
50 TABLET, FILM COATED ORAL ONCE
Status: COMPLETED | OUTPATIENT
Start: 2022-05-07 | End: 2022-05-07

## 2022-05-07 RX ORDER — ONDANSETRON 2 MG/ML
4 INJECTION INTRAMUSCULAR; INTRAVENOUS EVERY 6 HOURS PRN
Status: DISCONTINUED | OUTPATIENT
Start: 2022-05-07 | End: 2022-05-11 | Stop reason: HOSPADM

## 2022-05-07 RX ORDER — HEPARIN SODIUM 5000 [USP'U]/ML
5000 INJECTION, SOLUTION INTRAVENOUS; SUBCUTANEOUS EVERY 12 HOURS SCHEDULED
Status: DISCONTINUED | OUTPATIENT
Start: 2022-05-07 | End: 2022-05-11 | Stop reason: HOSPADM

## 2022-05-07 RX ORDER — MEMANTINE HYDROCHLORIDE 5 MG/1
5 TABLET ORAL DAILY
Status: ON HOLD | COMMUNITY
Start: 2022-03-24 | End: 2022-07-04

## 2022-05-07 RX ORDER — DICYCLOMINE HCL 20 MG
20 TABLET ORAL 3 TIMES DAILY
Status: ON HOLD | COMMUNITY
Start: 2022-02-07 | End: 2022-07-04

## 2022-05-07 RX ORDER — MONTELUKAST SODIUM 10 MG/1
10 TABLET ORAL DAILY
Status: ON HOLD | COMMUNITY
Start: 2022-02-07 | End: 2022-05-10

## 2022-05-07 RX ADMIN — MONTELUKAST 10 MG: 10 TABLET, FILM COATED ORAL at 20:31

## 2022-05-07 RX ADMIN — METOPROLOL TARTRATE 50 MG: 50 TABLET, FILM COATED ORAL at 04:07

## 2022-05-07 RX ADMIN — HYDRALAZINE HYDROCHLORIDE 5 MG: 20 INJECTION INTRAMUSCULAR; INTRAVENOUS at 00:59

## 2022-05-07 RX ADMIN — CELECOXIB 100 MG: 100 CAPSULE ORAL at 11:11

## 2022-05-07 RX ADMIN — CITALOPRAM HYDROBROMIDE 40 MG: 20 TABLET, FILM COATED ORAL at 11:11

## 2022-05-07 RX ADMIN — TRIAMTERENE AND HYDROCHLOROTHIAZIDE 1 TABLET: 37.5; 25 TABLET ORAL at 11:11

## 2022-05-07 RX ADMIN — Medication 10 ML: at 20:31

## 2022-05-07 RX ADMIN — HEPARIN SODIUM 5000 UNITS: 5000 INJECTION, SOLUTION INTRAVENOUS; SUBCUTANEOUS at 20:31

## 2022-05-07 RX ADMIN — HEPARIN SODIUM 5000 UNITS: 5000 INJECTION, SOLUTION INTRAVENOUS; SUBCUTANEOUS at 11:10

## 2022-05-07 RX ADMIN — Medication 1000 UNITS: at 11:11

## 2022-05-07 NOTE — H&P
[facility] Medicine Services      Patient Name: Odilia Zuniga  : 1945  MRN: [id number]  Primary Care Physician:  Zonia Ch DO  Date of admission: 2022      Subjective      Chief Complaint: Generalized weakness    History of Present Illness: Odilia Zuniga is a 76 y.o. female who presented to [facility] on 2022 complaining of debility secondary to generalized weakness.  Patient is accompanied here by her son.  Patient and her son state that the patient is generalized weakness has been getting worse over the past 5 years.  And is marked markedly worse over the past 6 months.  Patient states that it is hard for her to do ADLs around the house.  And is especially hard for her to stand up from a sitting position she sometimes has to sit back down very quickly.  When these events happen she is very unsteady on her feet and she reports falls.  She reports many frequent intermittent falls throughout the past years.  No trauma to the head.  Patient denies any syncopal episodes and no loss of consciousness.  Patient states that she has never awoken and not known where she is at.  Patient's mentation currently is at baseline according to her son.  Patient answers questions appropriately and is lucid.  Patient states that she tries eat 2 meals a day but more likely she only needs around 1-1/2 meals a day.  She also tries to drink a lot of water.  Patient denies any shortness of breath chest pain, dizziness, visual changes, headache, changes in stool, no blood in stool no dysuria no change in urinary habits no dysuria no focal neurological deficits noted.      Review of Systems   Reason unable to perform ROS: All review systems dictated above in HPI.   HENT: Negative.    Eyes: Negative.    Respiratory: Negative.    Skin: Negative.    Musculoskeletal: Negative.    Genitourinary: Negative.    Neurological: Positive for loss of balance and weakness.    Psychiatric/Behavioral: Negative.    All other systems reviewed and are negative.       Personal History     Past Medical History:   Diagnosis Date   • Hypertension         cholecystectomy active appendectomy kidney stones removed  Family History: Mother and father passed away from heart disease, mother had diabetes and family members have hyperlipidemia Alzheimer's and cancer    Social History:  reports that she has never smoked. She does not have any smokeless tobacco history on file. She reports previous alcohol use. She reports that she does not use drugs.    Home Medications:  Prior to Admission Medications     Prescriptions Last Dose Informant Patient Reported? Taking?    cholecalciferol (VITAMIN D3) 25 MCG (1000 UT) tablet  Child Yes No    Take 1,000 Units by mouth Daily.    citalopram (CeleXA) 10 MG tablet  Child Yes No    Take 40 mg by mouth Daily.    diclofenac-miSOPROStol (ARTHROTEC 50) 50-0.2 MG EC tablet  Child Yes No    Take 1 tablet by mouth 2 (Two) Times a Day.    montelukast (SINGULAIR) 10 MG tablet  Child Yes No    Take 10 mg by mouth Every Night.    multivitamin with minerals (CENTRUM SILVER PO)  Child Yes No    Take 1 tablet by mouth Daily. Pt takes 1/2 pill per day    triamterene-hydrochlorothiazide (DYAZIDE) 37.5-25 MG per capsule  Child Yes No    Take 1 capsule by mouth Every Morning.            Allergies:  Allergies   Allergen Reactions   • Penicillins Hives       Objective      Vitals:   Temp:  [99.2 °F (37.3 °C)] 99.2 °F (37.3 °C)  Heart Rate:  [71-79] 71  Resp:  [17-18] 17  BP: (141-165)/(74-78) 165/74    Physical Exam  Constitutional:       General: She is not in acute distress.     Appearance: Normal appearance. She is not toxic-appearing or diaphoretic.      Comments: Cachectic   HENT:      Head: Normocephalic and atraumatic.      Nose: Nose normal.      Mouth/Throat:      Pharynx: Oropharynx is clear.   Eyes:      Extraocular Movements: Extraocular movements intact.       Conjunctiva/sclera: Conjunctivae normal.      Pupils: Pupils are equal, round, and reactive to light.   Cardiovascular:      Rate and Rhythm: Normal rate and regular rhythm.   Pulmonary:      Effort: Pulmonary effort is normal. No respiratory distress.      Breath sounds: Normal breath sounds. No wheezing or rales.   Abdominal:      General: Abdomen is flat. Bowel sounds are normal. There is no distension.      Palpations: Abdomen is soft. There is no mass.      Tenderness: There is no abdominal tenderness. There is no right CVA tenderness, left CVA tenderness, guarding or rebound.      Hernia: No hernia is present.   Musculoskeletal:         General: No swelling or deformity. Normal range of motion.      Cervical back: Normal range of motion and neck supple. No rigidity.   Lymphadenopathy:      Cervical: No cervical adenopathy.   Skin:     General: Skin is warm and dry.      Capillary Refill: Capillary refill takes less than 2 seconds.   Neurological:      General: No focal deficit present.      Mental Status: She is alert and oriented to person, place, and time. Mental status is at baseline.      Motor: Weakness present.      Comments: Patient is is exhibiting 3 out of 5 motor weakness in all 4 extremities.  Patient can only lift her extremities against gravity alone.  Patient usually uses walker at home   Psychiatric:         Mood and Affect: Mood normal.         Behavior: Behavior normal.         Thought Content: Thought content normal.         Judgment: Judgment normal.      Comments: Patient's mentation is clear and answers all questions appropriately and concise          Result Review    Result Review:  I have personally reviewed the results from the time of this admission to 5/6/2022 22:16 EDT and agree with these findings:  [x]  Laboratory  [x]  Microbiology  [x]  Radiology  [x]  EKG/Telemetry   []  Cardiology/Vascular   []  Pathology  []  Old records  []  Other:  Most notable findings include:  Debility      Assessment/Plan        Active Hospital Problems: Severe debility secondary to generalized weakness, hypertension, osteoporosis    Plan:     Severe debility secondary to generalized weakness  Admit patient to observation  Vital signs per unit policy  Telemetry  EKG showed normal sinus no ST changes  CIP's were within normal limits  Patient has been showing progressing severe signs of generalized weakness and debility for the past 5 years, I think it is time for family and the patient to sit down and talk about a more well suited living experience for her as a assisted living home  CT head showed no acute abnormality  MRI  H&H 11 and 33.4  Respiratory panel PCR was negative    Hypertension  Vital signs per unit policy  We will start hydralazine 5 mg IV for systolic blood pressure greater than 160  Concerning patient's age and debility I think moderate hypertension 140-160 is ideal  Start patient's at home Maxide 37.5/25 mg tablet daily    Osteoporosis  Start patient's at home Colie Calciferol  Patient's celecoxib 100 mg daily    MDD  Start patient citalopram 40 mg daily    DVT prophylaxis:  No DVT prophylaxis order currently exists.    CODE STATUS:       Admission Status:  I believe this patient meets observation status.    I discussed the patient's findings and my recommendations with patient.      Signature: Jacques Decker MD

## 2022-05-07 NOTE — SIGNIFICANT NOTE
05/07/22 1921   OTHER   Discipline physical therapist   Rehab Time/Intention   Session Not Performed patient/family declined, not feeling well  (Pt A/O to self and disoriented x3. When asked to sit EOB pt reports abdominal discomfort and that she can't right now. Pt denied PT following education to the importance of functional mobility and goals of PT.)   Recommendation   PT - Next Appointment 05/08/22

## 2022-05-07 NOTE — PROGRESS NOTES
[facility] Medicine Services Daily Progress Note    Patient Name: Odilia Zuniga  : 1945  MRN: [id number]  Primary Care Physician:  Zonia Ch DO  Date of admission: 2022      Subjective      Chief Complaint: Generalized weakness.    Subjective   Patient Reports feeling weak, denies for any nausea or vomiting, no chest pain    Review of Systems   All other systems reviewed and are negative.      Objective      Vitals:   Temp:  [98 °F (36.7 °C)-99.2 °F (37.3 °C)] 98.2 °F (36.8 °C)  Heart Rate:  [55-79] 55  Resp:  [16-20] 16  BP: (123-196)/() 124/66    Physical Exam  Vitals and nursing note reviewed.   Constitutional:       General: She is not in acute distress.     Appearance: Normal appearance. She is well-developed. She is not ill-appearing, toxic-appearing or diaphoretic.   HENT:      Head: Normocephalic and atraumatic.      Right Ear: Ear canal and external ear normal.      Left Ear: Ear canal and external ear normal.      Nose: Nose normal. No congestion or rhinorrhea.      Mouth/Throat:      Mouth: Mucous membranes are moist.      Pharynx: No oropharyngeal exudate.   Eyes:      General: No scleral icterus.        Right eye: No discharge.         Left eye: No discharge.      Extraocular Movements: Extraocular movements intact.      Conjunctiva/sclera: Conjunctivae normal.      Pupils: Pupils are equal, round, and reactive to light.   Neck:      Thyroid: No thyromegaly.      Vascular: No carotid bruit or JVD.      Trachea: No tracheal deviation.   Cardiovascular:      Rate and Rhythm: Normal rate and regular rhythm.      Pulses: Normal pulses.      Heart sounds: Normal heart sounds. No murmur heard.    No friction rub. No gallop.   Pulmonary:      Effort: Pulmonary effort is normal. No respiratory distress.      Breath sounds: Normal breath sounds. No stridor. No wheezing, rhonchi or rales.   Chest:      Chest wall: No tenderness.   Abdominal:      General:  Bowel sounds are normal. There is no distension.      Palpations: Abdomen is soft. There is no mass.      Tenderness: There is no abdominal tenderness. There is no guarding or rebound.      Hernia: No hernia is present.   Musculoskeletal:         General: No swelling, tenderness, deformity or signs of injury. Normal range of motion.      Cervical back: Normal range of motion and neck supple. No rigidity. No muscular tenderness.      Right lower leg: No edema.      Left lower leg: No edema.   Lymphadenopathy:      Cervical: No cervical adenopathy.   Skin:     General: Skin is warm and dry.      Coloration: Skin is not jaundiced or pale.      Findings: No bruising, erythema or rash.   Neurological:      General: No focal deficit present.      Mental Status: She is alert and oriented to person, place, and time. Mental status is at baseline.      Cranial Nerves: No cranial nerve deficit.      Sensory: No sensory deficit.      Motor: No weakness or abnormal muscle tone.      Coordination: Coordination normal.   Psychiatric:         Mood and Affect: Mood normal.         Behavior: Behavior normal.         Thought Content: Thought content normal.         Judgment: Judgment normal.             Result Review    Result Review:  I have personally reviewed the results from the time of this admission to 5/7/2022 13:44 EDT and agree with these findings:  [x]  Laboratory  [x]  Microbiology  [x]  Radiology  []  EKG/Telemetry   []  Cardiology/Vascular   []  Pathology  []  Old records  []  Other:  Most notable findings include:         Assessment/Plan      Brief Patient Summary:  Odilia Zuniga is a 76 y.o. female who       celecoxib, 100 mg, Oral, Daily  cholecalciferol, 1,000 Units, Oral, Daily  citalopram, 40 mg, Oral, Daily  heparin (porcine), 5,000 Units, Subcutaneous, Q12H  [START ON 5/8/2022] metoprolol tartrate, 25 mg, Oral, Daily  montelukast, 10 mg, Oral, Nightly  triamterene-hydrochlorothiazide, 1 tablet, Oral,  Daily             Active Hospital Problems:  Active Hospital Problems    Diagnosis    • Fall, initial encounter      Plan:     Severe debility secondary to generalized weakness / physical deconditioning.  Vital signs per unit policy  Telemetry  EKG showed normal sinus no ST changes  CIP's were within normal limits  Patient has been showing progressing severe signs of generalized weakness and debility for the past 5 years, I think it is time for family and the patient to sit down and talk about a more well suited living experience for her as a assisted living home  CT head showed no acute abnormality  Respiratory panel PCR was negative  Pt will benefit from rehab, awaited PT OT input.  consult for safe discharge planning.       Hypertension  Vital signs per unit policy  We will start hydralazine 5 mg IV for systolic blood pressure greater than 160  Concerning patient's age and debility I think moderate hypertension 140-160 is ideal  Start patient's at home Maxide 37.5/25 mg tablet daily.     Osteoporosis  Start patient's at home Colie Calciferol  Patient's celecoxib 100 mg daily     MDD  Start patient citalopram 40 mg daily     DVT prophylaxis:    DVT prophylaxis:  Medical DVT prophylaxis orders are present.    CODE STATUS:    Code Status (Patient has no pulse and is not breathing): CPR (Attempt to Resuscitate)  Medical Interventions (Patient has pulse or is breathing): Full Support      Disposition:  I expect patient to be discharged as per clinical course.    This patient has been examined wearing appropriate Personal Protective Equipment and discussed with hospital infection control department. 05/07/22      Electronically signed by Altagracia Kruse MD, 05/07/22, 13:44 EDT.  [facility] Hospitalist Team

## 2022-05-07 NOTE — PLAN OF CARE
Goal Outcome Evaluation:  Plan of Care Reviewed With: patient        Progress: no change  Outcome Evaluation: Patient shows no s/s of distress and vitals are stable. Pt voiced no concerns. Patient is alert and oriented to self. Bed alarm on and call light within reach. Will continue to monitor.

## 2022-05-07 NOTE — NURSING NOTE
Placed call to provider regarding pt b/p 192/88 with HR 88.  Pt was given PRN medication at 0059 and it is to soon to administer again. awaiting return call.

## 2022-05-07 NOTE — PLAN OF CARE
Goal Outcome Evaluation:  Plan of Care Reviewed With: patient        Progress: no change  Outcome Evaluation: patient admitted earlier tonight.  No c/o pain or discomfort.  Will cotninue with current orders care plans and monitoring

## 2022-05-08 LAB — QT INTERVAL: 431 MS

## 2022-05-08 PROCEDURE — 99232 SBSQ HOSP IP/OBS MODERATE 35: CPT | Performed by: HOSPITALIST

## 2022-05-08 PROCEDURE — 25010000002 HEPARIN (PORCINE) PER 1000 UNITS: Performed by: HOSPITALIST

## 2022-05-08 PROCEDURE — 25010000002 HYDRALAZINE PER 20 MG: Performed by: FAMILY MEDICINE

## 2022-05-08 PROCEDURE — 97162 PT EVAL MOD COMPLEX 30 MIN: CPT

## 2022-05-08 PROCEDURE — G0378 HOSPITAL OBSERVATION PER HR: HCPCS

## 2022-05-08 RX ADMIN — Medication 10 ML: at 08:25

## 2022-05-08 RX ADMIN — CELECOXIB 100 MG: 100 CAPSULE ORAL at 08:26

## 2022-05-08 RX ADMIN — HEPARIN SODIUM 5000 UNITS: 5000 INJECTION, SOLUTION INTRAVENOUS; SUBCUTANEOUS at 08:26

## 2022-05-08 RX ADMIN — ACETAMINOPHEN 650 MG: 325 TABLET ORAL at 08:25

## 2022-05-08 RX ADMIN — Medication 1000 UNITS: at 08:25

## 2022-05-08 RX ADMIN — TRIAMTERENE AND HYDROCHLOROTHIAZIDE 1 TABLET: 37.5; 25 TABLET ORAL at 08:25

## 2022-05-08 RX ADMIN — MONTELUKAST 10 MG: 10 TABLET, FILM COATED ORAL at 20:02

## 2022-05-08 RX ADMIN — CITALOPRAM HYDROBROMIDE 40 MG: 20 TABLET, FILM COATED ORAL at 08:25

## 2022-05-08 RX ADMIN — HYDRALAZINE HYDROCHLORIDE 5 MG: 20 INJECTION INTRAMUSCULAR; INTRAVENOUS at 04:14

## 2022-05-08 RX ADMIN — HEPARIN SODIUM 5000 UNITS: 5000 INJECTION, SOLUTION INTRAVENOUS; SUBCUTANEOUS at 20:01

## 2022-05-08 RX ADMIN — METOPROLOL TARTRATE 25 MG: 25 TABLET, FILM COATED ORAL at 08:25

## 2022-05-08 NOTE — DISCHARGE PLACEMENT REQUEST
"Odilia Whitehead (76 y.o. Female)             Date of Birth   1945    Social Security Number       Address   [address] [place] [address]    Home Phone   [phone]    MRN   [id number]       Anglican   None    Marital Status                               Admission Date   5/6/22    Admission Type   Emergency    Admitting Provider   Altagracia Kruse MD    Attending Provider   Altagracia Kruse MD    Department, Room/Bed   [facility] 3C MEDICAL INPATIENT, 373/1       Discharge Date       Discharge Disposition       Discharge Destination                               Attending Provider: Altagracia Kruse MD    Allergies: Penicillins    Isolation: Enh Drop/Con   Infection: COVID (rule out) (05/06/22)   Code Status: CPR   Advance Care Planning Activity    Ht: 162.6 cm (64\")   Wt: 39.3 kg (86 lb 11.2 oz)    Admission Cmt: None   Principal Problem: None                Active Insurance as of 5/6/2022     Primary Coverage     Payor Plan Insurance Group Employer/Plan Group    MEDICARE MEDICARE A & B      Payor Plan Address Payor Plan Phone Number Payor Plan Fax Number Effective Dates    [address] [phone]  10/1/2010 - None Entered    [facility] [id number]       Subscriber Name Subscriber Birth Date Member ID       ODILIA WHITEHEAD 1945 [id number]           Secondary Coverage     Payor Plan Insurance Group Employer/Plan Group    AARP MC SUP AARP HEALTH CARE OPTIONS      Payor Plan Address Payor Plan Phone Number Payor Plan Fax Number Effective Dates    [facility] [phone]  1/1/2019 - None Entered    [address]       [facility] [address]       Subscriber Name Subscriber Birth Date Member ID       ODILIA WHITEHEAD 1945 [id number]                 Emergency Contacts      (Rel.) Home Phone Work Phone Mobile Phone    ALLAN GUTIÉRREZ (Son) [phone] -- [phone]    BELA GUTIÉRREZ (Other) [phone] -- [phone]             "   History & Physical      Jacques Decker MD at 22 [phone]              [facility] Medicine Services      Patient Name: Odilia Zuniga  : 1945  MRN: [id number]  Primary Care Physician:  Zonia Ch DO  Date of admission: 2022      Subjective      Chief Complaint: Generalized weakness    History of Present Illness: Odilia Zuniga is a 76 y.o. female who presented to [facility] on 2022 complaining of debility secondary to generalized weakness.  Patient is accompanied here by her son.  Patient and her son state that the patient is generalized weakness has been getting worse over the past 5 years.  And is marked markedly worse over the past 6 months.  Patient states that it is hard for her to do ADLs around the house.  And is especially hard for her to stand up from a sitting position she sometimes has to sit back down very quickly.  When these events happen she is very unsteady on her feet and she reports falls.  She reports many frequent intermittent falls throughout the past years.  No trauma to the head.  Patient denies any syncopal episodes and no loss of consciousness.  Patient states that she has never awoken and not known where she is at.  Patient's mentation currently is at baseline according to her son.  Patient answers questions appropriately and is lucid.  Patient states that she tries eat 2 meals a day but more likely she only needs around 1-1/2 meals a day.  She also tries to drink a lot of water.  Patient denies any shortness of breath chest pain, dizziness, visual changes, headache, changes in stool, no blood in stool no dysuria no change in urinary habits no dysuria no focal neurological deficits noted.      Review of Systems   Reason unable to perform ROS: All review systems dictated above in HPI.   HENT: Negative.    Eyes: Negative.    Respiratory: Negative.    Skin: Negative.    Musculoskeletal: Negative.    Genitourinary: Negative.     Neurological: Positive for loss of balance and weakness.   Psychiatric/Behavioral: Negative.    All other systems reviewed and are negative.       Personal History     Past Medical History:   Diagnosis Date   • Hypertension         cholecystectomy active appendectomy kidney stones removed  Family History: Mother and father passed away from heart disease, mother had diabetes and family members have hyperlipidemia Alzheimer's and cancer    Social History:  reports that she has never smoked. She does not have any smokeless tobacco history on file. She reports previous alcohol use. She reports that she does not use drugs.    Home Medications:  Prior to Admission Medications     Prescriptions Last Dose Informant Patient Reported? Taking?    cholecalciferol (VITAMIN D3) 25 MCG (1000 UT) tablet  Child Yes No    Take 1,000 Units by mouth Daily.    citalopram (CeleXA) 10 MG tablet  Child Yes No    Take 40 mg by mouth Daily.    diclofenac-miSOPROStol (ARTHROTEC 50) 50-0.2 MG EC tablet  Child Yes No    Take 1 tablet by mouth 2 (Two) Times a Day.    montelukast (SINGULAIR) 10 MG tablet  Child Yes No    Take 10 mg by mouth Every Night.    multivitamin with minerals (CENTRUM SILVER PO)  Child Yes No    Take 1 tablet by mouth Daily. Pt takes 1/2 pill per day    triamterene-hydrochlorothiazide (DYAZIDE) 37.5-25 MG per capsule  Child Yes No    Take 1 capsule by mouth Every Morning.            Allergies:  Allergies   Allergen Reactions   • Penicillins Hives       Objective      Vitals:   Temp:  [99.2 °F (37.3 °C)] 99.2 °F (37.3 °C)  Heart Rate:  [71-79] 71  Resp:  [17-18] 17  BP: (141-165)/(74-78) 165/74    Physical Exam  Constitutional:       General: She is not in acute distress.     Appearance: Normal appearance. She is not toxic-appearing or diaphoretic.      Comments: Cachectic   HENT:      Head: Normocephalic and atraumatic.      Nose: Nose normal.      Mouth/Throat:      Pharynx: Oropharynx is clear.   Eyes:      Extraocular  Movements: Extraocular movements intact.      Conjunctiva/sclera: Conjunctivae normal.      Pupils: Pupils are equal, round, and reactive to light.   Cardiovascular:      Rate and Rhythm: Normal rate and regular rhythm.   Pulmonary:      Effort: Pulmonary effort is normal. No respiratory distress.      Breath sounds: Normal breath sounds. No wheezing or rales.   Abdominal:      General: Abdomen is flat. Bowel sounds are normal. There is no distension.      Palpations: Abdomen is soft. There is no mass.      Tenderness: There is no abdominal tenderness. There is no right CVA tenderness, left CVA tenderness, guarding or rebound.      Hernia: No hernia is present.   Musculoskeletal:         General: No swelling or deformity. Normal range of motion.      Cervical back: Normal range of motion and neck supple. No rigidity.   Lymphadenopathy:      Cervical: No cervical adenopathy.   Skin:     General: Skin is warm and dry.      Capillary Refill: Capillary refill takes less than 2 seconds.   Neurological:      General: No focal deficit present.      Mental Status: She is alert and oriented to person, place, and time. Mental status is at baseline.      Motor: Weakness present.      Comments: Patient is is exhibiting 3 out of 5 motor weakness in all 4 extremities.  Patient can only lift her extremities against gravity alone.  Patient usually uses walker at home   Psychiatric:         Mood and Affect: Mood normal.         Behavior: Behavior normal.         Thought Content: Thought content normal.         Judgment: Judgment normal.      Comments: Patient's mentation is clear and answers all questions appropriately and concise          Result Review    Result Review:  I have personally reviewed the results from the time of this admission to 5/6/2022 22:16 EDT and agree with these findings:  [x]  Laboratory  [x]  Microbiology  [x]  Radiology  [x]  EKG/Telemetry   []  Cardiology/Vascular   []  Pathology  []  Old records  []   Other:  Most notable findings include: Debility      Assessment/Plan        Active Hospital Problems: Severe debility secondary to generalized weakness, hypertension, osteoporosis    Plan:     Severe debility secondary to generalized weakness  Admit patient to observation  Vital signs per unit policy  Telemetry  EKG showed normal sinus no ST changes  CIP's were within normal limits  Patient has been showing progressing severe signs of generalized weakness and debility for the past 5 years, I think it is time for family and the patient to sit down and talk about a more well suited living experience for her as a assisted living home  CT head showed no acute abnormality  MRI  H&H 11 and 33.4  Respiratory panel PCR was negative    Hypertension  Vital signs per unit policy  We will start hydralazine 5 mg IV for systolic blood pressure greater than 160  Concerning patient's age and debility I think moderate hypertension 140-160 is ideal  Start patient's at home Maxide 37.5/25 mg tablet daily    Osteoporosis  Start patient's at home Colie Calciferol  Patient's celecoxib 100 mg daily    MDD  Start patient citalopram 40 mg daily    DVT prophylaxis:  No DVT prophylaxis order currently exists.    CODE STATUS:       Admission Status:  I believe this patient meets observation status.    I discussed the patient's findings and my recommendations with patient.      Signature: Jacques Decker MD    Electronically signed by Jacques Decker MD at 22 0222          Physician Progress Notes (last 24 hours)      Altagraica Kruse MD at 22 1422              [facility] Medicine Services Daily Progress Note    Patient Name: Odilia Zuniga  : 1945  MRN: [id number]  Primary Care Physician:  Zonia Ch DO  Date of admission: 2022      Subjective      Chief Complaint: Generalized weakness.    Subjective   Patient Reports feeling weak, denies for any nausea or vomiting, no chest  pain    Review of Systems   All other systems reviewed and are negative.      Objective      Vitals:   Temp:  [96 °F (35.6 °C)-98.4 °F (36.9 °C)] 98.3 °F (36.8 °C)  Heart Rate:  [57-68] 60  Resp:  [16-18] 16  BP: (100-171)/(56-78) 100/56    Physical Exam  Vitals and nursing note reviewed.   Constitutional:       General: She is not in acute distress.     Appearance: Normal appearance. She is well-developed. She is not ill-appearing, toxic-appearing or diaphoretic.   HENT:      Head: Normocephalic and atraumatic.      Right Ear: Ear canal and external ear normal.      Left Ear: Ear canal and external ear normal.      Nose: Nose normal. No congestion or rhinorrhea.      Mouth/Throat:      Mouth: Mucous membranes are moist.      Pharynx: No oropharyngeal exudate.   Eyes:      General: No scleral icterus.        Right eye: No discharge.         Left eye: No discharge.      Extraocular Movements: Extraocular movements intact.      Conjunctiva/sclera: Conjunctivae normal.      Pupils: Pupils are equal, round, and reactive to light.   Neck:      Thyroid: No thyromegaly.      Vascular: No carotid bruit or JVD.      Trachea: No tracheal deviation.   Cardiovascular:      Rate and Rhythm: Normal rate and regular rhythm.      Pulses: Normal pulses.      Heart sounds: Normal heart sounds. No murmur heard.    No friction rub. No gallop.   Pulmonary:      Effort: Pulmonary effort is normal. No respiratory distress.      Breath sounds: Normal breath sounds. No stridor. No wheezing, rhonchi or rales.   Chest:      Chest wall: No tenderness.   Abdominal:      General: Bowel sounds are normal. There is no distension.      Palpations: Abdomen is soft. There is no mass.      Tenderness: There is no abdominal tenderness. There is no guarding or rebound.      Hernia: No hernia is present.   Musculoskeletal:         General: No swelling, tenderness, deformity or signs of injury. Normal range of motion.      Cervical back: Normal range of  motion and neck supple. No rigidity. No muscular tenderness.      Right lower leg: No edema.      Left lower leg: No edema.   Lymphadenopathy:      Cervical: No cervical adenopathy.   Skin:     General: Skin is warm and dry.      Coloration: Skin is not jaundiced or pale.      Findings: No bruising, erythema or rash.   Neurological:      General: No focal deficit present.      Mental Status: She is alert and oriented to person, place, and time. Mental status is at baseline.      Cranial Nerves: No cranial nerve deficit.      Sensory: No sensory deficit.      Motor: No weakness or abnormal muscle tone.      Coordination: Coordination normal.   Psychiatric:         Mood and Affect: Mood normal.         Behavior: Behavior normal.         Thought Content: Thought content normal.         Judgment: Judgment normal.             Result Review    Result Review:  I have personally reviewed the results from the time of this admission to 5/8/2022 14:22 EDT and agree with these findings:  [x]  Laboratory  [x]  Microbiology  [x]  Radiology  []  EKG/Telemetry   []  Cardiology/Vascular   []  Pathology  []  Old records  []  Other:  Most notable findings include:         Assessment/Plan      Brief Patient Summary:  Odilia Zuniga is a 76 y.o. female who       celecoxib, 100 mg, Oral, Daily  cholecalciferol, 1,000 Units, Oral, Daily  citalopram, 40 mg, Oral, Daily  heparin (porcine), 5,000 Units, Subcutaneous, Q12H  metoprolol tartrate, 25 mg, Oral, Daily  montelukast, 10 mg, Oral, Nightly  triamterene-hydrochlorothiazide, 1 tablet, Oral, Daily             Active Hospital Problems:  Active Hospital Problems    Diagnosis    • Fall, initial encounter      Plan:     Severe debility secondary to generalized weakness / physical deconditioning.  Vital signs per unit policy  Telemetry  EKG showed normal sinus no ST changes  CIP's were within normal limits  Patient has been showing progressing severe signs of generalized weakness and  debility for the past 5 years, I think it is time for family and the patient to sit down and talk about a more well suited living experience for her as a assisted living home  CT head showed no acute abnormality  Respiratory panel PCR was negative  Pt will benefit from rehab, awaited PT OT input.  consult for safe discharge planning.       Hypertension  Vital signs per unit policy  We will start hydralazine 5 mg IV for systolic blood pressure greater than 160  Concerning patient's age and debility I think moderate hypertension 140-160 is ideal  Start patient's at home Maxide 37.5/25 mg tablet daily.     Osteoporosis  Start patient's at home Colie Calciferol  Patient's celecoxib 100 mg daily     MDD  Start patient citalopram 40 mg daily     DVT prophylaxis:    Okay to discharge to rehab once arrangements are done.    DVT prophylaxis:  Medical DVT prophylaxis orders are present.    CODE STATUS:    Code Status (Patient has no pulse and is not breathing): CPR (Attempt to Resuscitate)  Medical Interventions (Patient has pulse or is breathing): Full Support      Disposition:  I expect patient to be discharged as per clinical course.    This patient has been examined wearing appropriate Personal Protective Equipment and discussed with hospital infection control department. 05/08/22      Electronically signed by Altagracia Kruse MD, 05/08/22, 14:22 EDT.  [facility] Hospitalist Team             Electronically signed by Altagracia Kruse MD at 05/08/22 1426          Physical Therapy Notes (last 24 hours)      Dorcas Macedo, PT at 05/07/22 1922  Version 1 of 1            05/07/22 1921   OTHER   Discipline physical therapist   Rehab Time/Intention   Session Not Performed patient/family declined, not feeling well  (Pt A/O to self and disoriented x3. When asked to sit EOB pt reports abdominal discomfort and that she can't right now. Pt denied PT following education to the importance of functional mobility  and goals of PT.)   Recommendation   PT - Next Appointment 22       Electronically signed by Dorcas Macedo, PT at 22 1922     Devika Shell, PT at 22 1240  Version 1 of          Problem: Adult Inpatient Plan of Care  Goal: Plan of Care Review  Outcome: Ongoing, Progressing  Flowsheets  Taken 2022 1238  Plan of Care Reviewed With: patient  Outcome Evaluation: Pt is 77 yo female admitted for recent falls, weakness, AMS, impaired balance.  CT head (-) acute changes.  Xray bilateral knee (-), xray left hip (-) acute changes.  Pt presents with confusion and exhibits poor safety awareness.  Pt requiring Darci-modA for functional mobility due to weakness and impaired balance.  Pt presents in soiled sheets and exhibits incontinence with ambulation.  Pt not safe to return home due to high risk for falls and other mobility deficits.  Pt will need SNF/subacute rehab to address deficits.  PT will follow 5x/week while at [facility].      Electronically signed by Devika Shell PT at 22 1241     Devika Shell, PT at 22 1241  Version 1 of 1         Patient Name: Odilia Zuniga  : 1945    MRN: [id number]                              Today's Date: 2022       Admit Date: 2022    Visit Dx:     ICD-10-CM ICD-9-CM   1. Fall, initial encounter  W19.XXXA E888.9   2. Generalized weakness  R53.1 780.79   3. Injury of head, initial encounter  S09.90XA 959.01   4. Acute pain of both knees  M25.561 338.19    M25.562 719.46   5. Left hip pain  M25.552 719.45   6. Balance problem  R26.89 781.99     Patient Active Problem List   Diagnosis   • Fall, initial encounter     Past Medical History:   Diagnosis Date   • Hypertension      No past surgical history on file.   General Information     Row Name 22 1228          Physical Therapy Time and Intention    Document Type evaluation  -HC     Mode of Treatment physical therapy  -HC     Row Name 22 1228          General  Information    Patient Profile Reviewed yes  -     Prior Level of Function --  pt is poor historian, but reports using RW at home  -     Existing Precautions/Restrictions fall  -     Barriers to Rehab cognitive status  -     Row Name 05/08/22 1228          Living Environment    People in Home sibling(s)  pt reports she resides with her sister, but unsure if this is correct due to pt confusion  -     Row Name 05/08/22 1228          Cognition    Orientation Status (Cognition) oriented to;person  Confused to place, time, situation; very confused in regards to PLOF and safety awareness, high risk for falls  -     Row Name 05/08/22 1228          Safety Issues, Functional Mobility    Safety Issues Affecting Function (Mobility) safety precaution awareness;insight into deficits/self-awareness  -     Impairments Affecting Function (Mobility) balance;cognition;postural/trunk control;endurance/activity tolerance;strength;pain  -           User Key  (r) = Recorded By, (t) = Taken By, (c) = Cosigned By    Initials Name Provider Type     Devika Shell, PT Physical Therapist               Mobility     Row Name 05/08/22 1233          Bed Mobility    Bed Mobility supine-sit  -     Supine-Sit [place] (Bed Mobility) moderate assist (50% patient effort)  -     Row Name 05/08/22 1233          Sit-Stand Transfer    Sit-Stand [place] (Transfers) moderate assist (50% patient effort);minimum assist (75% patient effort)  -     Assistive Device (Sit-Stand Transfers) walker, front-wheeled  -     Row Name 05/08/22 1233          Gait/Stairs (Locomotion)    [place] Level (Gait) minimum assist (75% patient effort);moderate assist (50% patient effort)  -     Assistive Device (Gait) walker, front-wheeled  -     Distance in Feet (Gait) 25 ft, 5 ft  -     Deviations/Abnormal Patterns (Gait) gait speed decreased  -     Bilateral Gait Deviations forward flexed posture  -     Comment, (Gait/Stairs)  high risk for falls, poor safety awareness  -HC           User Key  (r) = Recorded By, (t) = Taken By, (c) = Cosigned By    Initials Name Provider Type     Devika Shell, PT Physical Therapist               Obj/Interventions     Row Name 05/08/22 1234          Range of Motion Comprehensive    Comment, General Range of Motion BLEs WFL  -HC     Row Name 05/08/22 1234          Strength Comprehensive (MMT)    Comment, General Manual Muscle Testing (MMT) Assessment BLEs grossly 4-/5 except hips 3-/5  -HC     Row Name 05/08/22 1234          Balance    Balance Assessment sitting static balance;sitting dynamic balance;standing static balance;standing dynamic balance  -HC     Static Sitting Balance minimal assist  -HC     Dynamic Sitting Balance minimal assist  -HC     Static Standing Balance moderate assist  -HC     Dynamic Standing Balance moderate assist  -HC     Position/Device Used, Standing Balance walker, rolling  -HC           User Key  (r) = Recorded By, (t) = Taken By, (c) = Cosigned By    Initials Name Provider Type     Devika Shell, PT Physical Therapist               Goals/Plan     Row Name 05/08/22 1236          Bed Mobility Goal 1 (PT)    Activity/Assistive Device (Bed Mobility Goal 1, PT) bed mobility activities, all  -HC     [place] Level/Cues Needed (Bed Mobility Goal 1, PT) contact guard required  -HC     Time Frame (Bed Mobility Goal 1, PT) 2 weeks  -HC     Row Name 05/08/22 1236          Transfer Goal 1 (PT)    Activity/Assistive Device (Transfer Goal 1, PT) transfers, all  -HC     [place] Level/Cues Needed (Transfer Goal 1, PT) contact guard required  -HC     Time Frame (Transfer Goal 1, PT) 2 weeks  -     Row Name 05/08/22 1236          Gait Training Goal 1 (PT)    Activity/Assistive Device (Gait Training Goal 1, PT) gait (walking locomotion);assistive device use  -HC     [place] Level (Gait Training Goal 1, PT) contact guard required  -HC     Distance (Gait Training Goal  1, PT) 75 ft  -     Time Frame (Gait Training Goal 1, PT) 2 weeks  -     Row Name 05/08/22 1236          Therapy Assessment/Plan (PT)    Planned Therapy Interventions (PT) balance training;bed mobility training;gait training;motor coordination training;postural re-education;transfer training;patient/family education;strengthening;neuromuscular re-education;ROM (range of motion)  -           User Key  (r) = Recorded By, (t) = Taken By, (c) = Cosigned By    Initials Name Provider Type     Devika Shell, PT Physical Therapist               Clinical Impression     Row Name 05/08/22 1234          Pain    Additional Documentation Pain Scale: FACES Pre/Post-Treatment (Group)  -     Row Name 05/08/22 1234          Pain Scale: FACES Pre/Post-Treatment    Pain: FACES Scale, Pretreatment 4-->hurts little more  -     Posttreatment Pain Rating 4-->hurts little more  -     Pre/Posttreatment Pain Comment hip discomfort  -     Row Name 05/08/22 1234          Plan of Care Review    Outcome Evaluation Pt is 77 yo female admitted for recent falls, weakness, AMS, impaired balance.  CT head (-) acute changes.  Xray bilateral knee (-), xray left hip (-) acute changes.  -     Row Name 05/08/22 1234          Therapy Assessment/Plan (PT)    Patient/Family Therapy Goals Statement (PT) increase strength  -     Rehab Potential (PT) good, to achieve stated therapy goals  -     Criteria for Skilled Interventions Met (PT) yes;skilled treatment is necessary  -     Therapy Frequency (PT) 5 times/wk  -     Predicted Duration of Therapy Intervention (PT) until d/c  -[facility] Name 05/08/22 1234          Positioning and Restraints    Pre-Treatment Position in bed  -     Post Treatment Position chair  -HC     In Chair notified nsg;call light within reach;encouraged to call for assist;exit alarm on  -           User Key  (r) = Recorded By, (t) = Taken By, (c) = Cosigned By    Initials Name Provider Type     [place]  Devika GLYNN, PT Physical Therapist               Outcome Measures     Row Name 05/08/22 1237          How much help from another person do you currently need...    Turning from your back to your side while in flat bed without using bedrails? 2  -HC     Moving from lying on back to sitting on the side of a flat bed without bedrails? 2  -HC     Moving to and from a bed to a chair (including a wheelchair)? 2  -HC     Standing up from a chair using your arms (e.g., wheelchair, bedside chair)? 2  -HC     Climbing 3-5 steps with a railing? 1  -HC     To walk in hospital room? 2  -HC     AM-PAC 6 Clicks Score (PT) 11  -HC     Highest level of mobility 4 --> Transferred to chair/commode  -     Row Name 05/08/22 1237          Modified [place]smel Scale    Modified [place] Scale 4 - Moderately severe disability.  Unable to walk without assistance, and unable to attend to own bodily needs without assistance.  -     Row Name 05/08/22 1237          Functional Assessment    Outcome Measure Options AM-PAC 6 Clicks Basic Mobility (PT);Modified [place]  -           User Key  (r) = Recorded By, (t) = Taken By, (c) = Cosigned By    Initials Name Provider Type     Devika Shell, PT Physical Therapist                             Physical Therapy Education                 Title: PT OT SLP Therapies (In Progress)     Topic: Physical Therapy (In Progress)     Point: Mobility training (In Progress)     Learning Progress Summary           Patient Acceptance, E, NR by  at 5/8/2022 1237                   Point: Precautions (In Progress)     Learning Progress Summary           Patient Acceptance, E, NR by  at 5/8/2022 1237                               User Key     Initials Effective Dates Name Provider Type [facility] 06/16/21 -  Devika Shell, PT Physical Therapist PT              PT Recommendation and Plan  Planned Therapy Interventions (PT): balance training, bed mobility training, gait training, motor coordination training,  postural re-education, transfer training, patient/family education, strengthening, neuromuscular re-education, ROM (range of motion)  Plan of Care Reviewed With: patient  Outcome Evaluation: Pt is 77 yo female admitted for recent falls, weakness, AMS, impaired balance.  CT head (-) acute changes.  Xray bilateral knee (-), xray left hip (-) acute changes.  Pt presents with confusion and exhibits poor safety awareness.  Pt requiring Darci-modA for functional mobility due to weakness and impaired balance.  Pt presents in soiled sheets and exhibits incontinence with ambulation.  Pt not safe to return home due to high risk for falls and other mobility deficits.  Pt will need SNF/subacute rehab to address deficits.  PT will follow 5x/week while at [facility].     Time Calculation:    PT Charges     Row Name 05/08/22 1241             Time Calculation    Start Time 0942  -      Stop Time 1007  -      Time Calculation (min) 25 min  -      PT Received On 05/08/22  -      PT - Next Appointment 05/10/22  -      PT Goal Re-Cert Due Date 05/22/22  -              Time Calculation- PT    Total Timed Code Minutes- PT 0 minute(s)  -            User Key  (r) = Recorded By, (t) = Taken By, (c) = Cosigned By    Initials Name Provider Type     Devika Shell, PT Physical Therapist              Therapy Charges for Today     Code Description Service Date Service Provider Modifiers Qty    [id number]  PT EVAL MOD COMPLEXITY 4 5/8/2022 Devika Shell, PT GP 1          PT G-Codes  Outcome Measure Options: AM-PAC 6 Clicks Basic Mobility (PT), Modified [place]  AM-PAC 6 Clicks Score (PT): 11  Modified [place] Scale: 4 - Moderately severe disability.  Unable to walk without assistance, and unable to attend to own bodily needs without assistance.    Devika Shell, HSAILESH  5/8/2022      Electronically signed by Devika Shell, PT at 05/08/22 1242          Occupational Therapy Notes (last 24 hours)      Flaquita Wheat, OT at  05/08/22 0945        Pt with PT. Will f/u next service date.    TENNILLE Hall, OTR      Electronically signed by Flaquita Wheat OT at 05/08/22 1700

## 2022-05-08 NOTE — CASE MANAGEMENT/SOCIAL WORK
Continued Stay Note  [facility]     Patient Name: Odilia Zuniga  MRN: [id number]  Today's Date: 5/8/2022    Admit Date: 5/6/2022     Discharge Plan     Row Name 05/08/22 1808       Plan    Plan PT recommending inpatient rehab. Referral placed to [facility]; pending acceptance. No precert needed. No PASRR required.    Patient/Family in Agreement with Plan yes    Plan Comments Spoke with son Breezy on the phone to discuss PT recommendations. Son would like a referral made to [facility]. CM informed him that the patient may not qualify for acute rehab based on PT notes. He stated that he understood. Referral made to [facility] and liaison notified. Inpatient rehab list sent to Breezy at [email or web address]. He plans to review and provide additional choices.              Phone communication or documentation only - no physical contact with patient or family.    Meron Lark[place] [address]  Office: [phone]  Fax: [phone]  [email or web address]

## 2022-05-08 NOTE — PLAN OF CARE
Problem: Adult Inpatient Plan of Care  Goal: Plan of Care Review  Outcome: Ongoing, Progressing  Flowsheets  Taken 5/8/2022 1238  Plan of Care Reviewed With: patient  Outcome Evaluation: Pt is 75 yo female admitted for recent falls, weakness, AMS, impaired balance.  CT head (-) acute changes.  Xray bilateral knee (-), xray left hip (-) acute changes.  Pt presents with confusion and exhibits poor safety awareness.  Pt requiring Darci-modA for functional mobility due to weakness and impaired balance.  Pt presents in soiled sheets and exhibits incontinence with ambulation.  Pt not safe to return home due to high risk for falls and other mobility deficits.  Pt will need SNF/subacute rehab to address deficits.  PT will follow 5x/week while at [facility].

## 2022-05-08 NOTE — CONSULTS
"Nutrition Services    Patient Name: Odilia Zuniga  YOB: 1945  MRN: [id number]  Admission date: 5/6/2022    *See [facility] data at bottom of this note.    Severe chronic disease related malnutrition R/t suspected chronic suboptimal intake in the setting of chronic medical problems/increasing debility; as evidenced by severe muscle and fat loss on physical exam, with weight loss greater than 7.5% in three months, and diet recall suggestive of intakes meeting less than 75% estimated needs for at least the last three months.     Start ONS to help meet needs:   Boost Plus TID (provides 1080 kcals, 42 g protein if consumed)   Magic Cups at lunch/dinner (Provides 580 kcals, 18 g protein if consumed)     Continue liberalized Regular diet    PPE Documentation        PPE Worn By Provider mask, gloves, eye protection and gown   PPE Worn By Patient  None      CLINICAL NUTRITION ASSESSMENT      Reason for Assessment 5/8: Low BMI     H&P  Pt presented to [facility] on 5/6/2022 complaining of debility secondary to generalized weakness.     Past Medical History:   Diagnosis Date   • Hypertension        No past surgical history on file.     Current Problems   Severe debility secondary to generalized weakness / physical deconditioning      Hypertension     Osteoporosis     MDD       Encounter Information        Trending Narrative     5/8: Pt assessed due to concern for low BMI. Pt welcomed RD for assessment; states her appetite has been less in the last few months, but she is unsure why. Cannot identify any recent illness or stressors that could have impacted appetite; states, \"It's just not the same.\" Pt is vague about usual intake - states \"I usually just fix dinner,\" and is unable to describe any meals she normally eats at home - diet recall for breakfast/lunch cannot remember what she usually eats. In conversation, it seems pt may simply be forgetting to eat at home. Likely needs someone else to manage " "meals and prompt her to eat. Encouraged pt to try eating at least three times daily and to try supplements while in hospital; pt will think about it. NFPE is significant for severe malnutrition.     Anthropometrics        Current Height, Weight Height: 162.6 cm (64\")  Weight: 39.3 kg (86 lb 11.2 oz) (05/08/22 0352)       Ideal Body Weight (IBW) 120 lb   Usual Body Weight (UBW) UTD       Trending Weight Hx     This admission: 5/8: Scale weight 39.3 kg this admission              PTA: 5/8: 15.7% weight loss in 2 months     Wt Readings from Last 30 Encounters:   05/08/22 0352 39.3 kg (86 lb 11.2 oz)   05/07/22 0059 42 kg (92 lb 8 oz)   05/06/22 1809 44.1 kg (97 lb 3.6 oz)   03/11/22 1557 46.6 kg (102 lb 11.8 oz)   11/26/21 1308 46.1 kg (101 lb 10.1 oz)      BMI kg/m2 Body mass index is 14.88 kg/m².       Labs        Pertinent Labs    Results from last 7 days   Lab Units 05/06/22  1912   SODIUM mmol/L 136   POTASSIUM mmol/L 3.8   CHLORIDE mmol/L 98   CO2 mmol/L 29.0   BUN mg/dL 24*   CREATININE mg/dL 1.01*   CALCIUM mg/dL 9.1   BILIRUBIN mg/dL 0.3   ALK PHOS U/L 59   ALT (SGPT) U/L 7   AST (SGOT) U/L 15   GLUCOSE mg/dL 101*     Results from last 7 days   Lab Units 05/06/22 1912   MAGNESIUM mg/dL 1.7   HEMOGLOBIN g/dL 11.0*   HEMATOCRIT % 33.4*     COVID19   Date Value Ref Range Status   05/06/2022 Not Detected Not Detected - Ref. Range Final     No results found for: HGBA1C     Medications    Scheduled Medications celecoxib, 100 mg, Oral, Daily  cholecalciferol, 1,000 Units, Oral, Daily  citalopram, 40 mg, Oral, Daily  heparin (porcine), 5,000 Units, Subcutaneous, Q12H  metoprolol tartrate, 25 mg, Oral, Daily  montelukast, 10 mg, Oral, Nightly  triamterene-hydrochlorothiazide, 1 tablet, Oral, Daily        Infusions      PRN Medications •  acetaminophen  •  hydrALAZINE  •  ondansetron  •  sodium chloride     Physical Findings        Trending Physical   Appearance, NFPE 5/8: NFPE completed, consistent with nutrition " diagnosis of malnutrition using AND/ASPEN criteria. See MSA below.      --  Edema  None     Bowel Function BM 5/7     Tubes No feeding tube in place      Chewing/Swallowing No chewing/swallowing problems pt pt report      Skin No breakdown      --  Current Nutrition Orders & Evaluation of Intake       Oral Nutrition     Food Allergies NKFA   Current PO Diet Diet Regular   Supplement None ordered   PO Evaluation     Trending % PO Intake ~50-75% at recent meals   --  Nutritional Risk Screening        NRS-2002 Score          Nutrition Diagnosis         Nutrition Dx Problem 1 Severe chronic disease related malnutrition R/t suspected chronic suboptimal intake in the setting of chronic medical problems/increasing debility; as evidenced by severe muscle and fat loss on physical exam, with weight loss greater than 7.5% in three months, and diet recall suggestive of intakes meeting less than 75% estimated needs for at least the last three months.       Nutrition Dx Problem 2        Intervention Goal         Intervention Goal(s) Weight stable or increasing   Pt eating at least 50% at meals      Nutrition Intervention        RD Action Will start ONS     Nutrition Prescription          Diet Prescription Regular   Supplement Prescription Boost Plus TID (provides 1080 kcals, 42 g protein if consumed)   Magic Cups at lunch/dinner (Provides 580 kcals, 18 g protein if consumed)      --  Monitor/Evaluation        Monitor I&O, PO intake, Supplement intake, Pertinent labs, Weight, Skin status, GI status, POC/GOC     Malnutrition Severity Assessment      Patient meets criteria for : Severe Malnutrition  Malnutrition Type (last 8 hours)     Malnutrition Severity Assessment     Row Name 05/08/22 1624       Malnutrition Severity Assessment    Malnutrition Type Chronic Disease - Related Malnutrition    Row Name 05/08/22 1624       Insufficient Energy Intake     Insufficient Energy Intake Findings Severe    Insufficient Energy Intake  <75%  of est. energy requirement for > or equal to 3 months    Row Name 05/08/22 1624       Unintentional Weight Loss     Unintentional Weight Loss Findings Severe    Unintentional Weight Loss  Weight loss greater than 7.5% in three months    Row Name 05/08/22 1624       Muscle Loss    Loss of Muscle Mass Findings Severe    Tenriism Region Severe - deep hollowing/scooping, lack of muscle to touch, facial bones well defined    Clavicle Bone Region Severe - protruding prominent bone    Acromion Bone Region Severe - squared shoulders, bones, and acromion process protrusion prominent    Scapular Bone Region Severe - prominent bones, depressions easily visible between ribs, scapula, spine, shoulders    Dorsal Hand Region Severe - prominent depression    Patellar Region Severe - prominent bone, square looking, very little muscle definition    Anterior Thigh Region Severe - line/depression along thigh, obviously thin    Posterior Calf Region Severe - thin with very little definition/firmness    Row Name 05/08/22 1624       Fat Loss    Subcutaneous Fat Loss Findings Severe    Orbital Region  Severe - pronounced hollowness/depression, dark circles, loose saggy skin    Upper Arm Region Severe - mostly skin, very little space between folds, fingers touch    Thoracic & Lumbar Region Severe - ribs visible with prominent depressions, iliac crest very prominent    Row Name 05/08/22 1624       Criteria Met (Must meet criteria for severity in at least 2 of these categories: M Wasting, Fat Loss, Fluid, Secondary Signs, Wt. Status, Intake)    Patient meets criteria for  Severe Malnutrition                   Electronically signed by:  Justine Durham RD  05/08/22 16:15 EDT

## 2022-05-08 NOTE — PLAN OF CARE
Goal Outcome Evaluation:  Plan of Care Reviewed With: patient        Progress: no change  Outcome Evaluation: Patient shows no s/s of distress and vitals are stable. Pt voiced concerns of pain, see MAR. Bed alarm on and call light within reach. Will continue to observe.

## 2022-05-08 NOTE — THERAPY EVALUATION
Patient Name: Odilia Zuniga  : 1945    MRN: [id number]                              Today's Date: 2022       Admit Date: 2022    Visit Dx:     ICD-10-CM ICD-9-CM   1. Fall, initial encounter  W19.XXXA E888.9   2. Generalized weakness  R53.1 780.79   3. Injury of head, initial encounter  S09.90XA 959.01   4. Acute pain of both knees  M25.561 338.19    M25.562 719.46   5. Left hip pain  M25.552 719.45   6. Balance problem  R26.89 781.99     Patient Active Problem List   Diagnosis   • Fall, initial encounter     Past Medical History:   Diagnosis Date   • Hypertension      No past surgical history on file.   General Information     Row Name 22 1228          Physical Therapy Time and Intention    Document Type evaluation  -     Mode of Treatment physical therapy  -     Row Name 22 1228          General Information    Patient Profile Reviewed yes  -HC     Prior Level of Function --  pt is poor historian, but reports using RW at home  -     Existing Precautions/Restrictions fall  -     Barriers to Rehab cognitive status  -     Row Name 22 1228          Living Environment    People in Home sibling(s)  pt reports she resides with her sister, but unsure if this is correct due to pt confusion  -     Row Name 22 1228          Cognition    Orientation Status (Cognition) oriented to;person  Confused to place, time, situation; very confused in regards to PLOF and safety awareness, high risk for falls  -     Row Name 22 1228          Safety Issues, Functional Mobility    Safety Issues Affecting Function (Mobility) safety precaution awareness;insight into deficits/self-awareness  -     Impairments Affecting Function (Mobility) balance;cognition;postural/trunk control;endurance/activity tolerance;strength;pain  -HC           User Key  (r) = Recorded By, (t) = Taken By, (c) = Cosigned By    Initials Name Provider Type    HC Devika Shell, PT Physical Therapist                Mobility     Row Name 05/08/22 1233          Bed Mobility    Bed Mobility supine-sit  -HC     Supine-Sit [place] (Bed Mobility) moderate assist (50% patient effort)  -HC     Row Name 05/08/22 1233          Sit-Stand Transfer    Sit-Stand [place] (Transfers) moderate assist (50% patient effort);minimum assist (75% patient effort)  -HC     Assistive Device (Sit-Stand Transfers) walker, front-wheeled  -HC     Row Name 05/08/22 1233          Gait/Stairs (Locomotion)    [place] Level (Gait) minimum assist (75% patient effort);moderate assist (50% patient effort)  -HC     Assistive Device (Gait) walker, front-wheeled  -HC     Distance in Feet (Gait) 25 ft, 5 ft  -HC     Deviations/Abnormal Patterns (Gait) gait speed decreased  -HC     Bilateral Gait Deviations forward flexed posture  -HC     Comment, (Gait/Stairs) high risk for falls, poor safety awareness  -HC           User Key  (r) = Recorded By, (t) = Taken By, (c) = Cosigned By    Initials Name Provider Type    HC Devika Shell, PT Physical Therapist               Obj/Interventions     Row Name 05/08/22 1234          Range of Motion Comprehensive    Comment, General Range of Motion BLEs WFL  -HC     Row Name 05/08/22 1234          Strength Comprehensive (MMT)    Comment, General Manual Muscle Testing (MMT) Assessment BLEs grossly 4-/5 except hips 3-/5  -HC     Row Name 05/08/22 1234          Balance    Balance Assessment sitting static balance;sitting dynamic balance;standing static balance;standing dynamic balance  -HC     Static Sitting Balance minimal assist  -HC     Dynamic Sitting Balance minimal assist  -HC     Static Standing Balance moderate assist  -HC     Dynamic Standing Balance moderate assist  -HC     Position/Device Used, Standing Balance walker, rolling  -HC           User Key  (r) = Recorded By, (t) = Taken By, (c) = Cosigned By    Initials Name Provider Type    HC Devika Shell, PT Physical Therapist                Goals/Plan     Row Name 05/08/22 1236          Bed Mobility Goal 1 (PT)    Activity/Assistive Device (Bed Mobility Goal 1, PT) bed mobility activities, all  -HC     [place] Level/Cues Needed (Bed Mobility Goal 1, PT) contact guard required  -HC     Time Frame (Bed Mobility Goal 1, PT) 2 weeks  -     Row Name 05/08/22 1236          Transfer Goal 1 (PT)    Activity/Assistive Device (Transfer Goal 1, PT) transfers, all  -HC     [place] Level/Cues Needed (Transfer Goal 1, PT) contact guard required  -HC     Time Frame (Transfer Goal 1, PT) 2 weeks  -     Row Name 05/08/22 1236          Gait Training Goal 1 (PT)    Activity/Assistive Device (Gait Training Goal 1, PT) gait (walking locomotion);assistive device use  -HC     [place] Level (Gait Training Goal 1, PT) contact guard required  -HC     Distance (Gait Training Goal 1, PT) 75 ft  -HC     Time Frame (Gait Training Goal 1, PT) 2 weeks  -     Row Name 05/08/22 1236          Therapy Assessment/Plan (PT)    Planned Therapy Interventions (PT) balance training;bed mobility training;gait training;motor coordination training;postural re-education;transfer training;patient/family education;strengthening;neuromuscular re-education;ROM (range of motion)  -           User Key  (r) = Recorded By, (t) = Taken By, (c) = Cosigned By    Initials Name Provider Type     Devika Shell, PT Physical Therapist               Clinical Impression     Row Name 05/08/22 1234          Pain    Additional Documentation Pain Scale: FACES Pre/Post-Treatment (Group)  -     Row Name 05/08/22 1235          Pain Scale: FACES Pre/Post-Treatment    Pain: FACES Scale, Pretreatment 4-->hurts little more  -     Posttreatment Pain Rating 4-->hurts little more  -     Pre/Posttreatment Pain Comment hip discomfort  -     Row Name 05/08/22 1236          Plan of Care Review    Outcome Evaluation Pt is 77 yo female admitted for recent falls, weakness, AMS, impaired balance.  CT  head (-) acute changes.  Xray bilateral knee (-), xray left hip (-) acute changes.  -     Row Name 05/08/22 1234          Therapy Assessment/Plan (PT)    Patient/Family Therapy Goals Statement (PT) increase strength  -HC     Rehab Potential (PT) good, to achieve stated therapy goals  -     Criteria for Skilled Interventions Met (PT) yes;skilled treatment is necessary  -     Therapy Frequency (PT) 5 times/wk  -HC     Predicted Duration of Therapy Intervention (PT) until d/c  -HC     Row Name 05/08/22 1234          Positioning and Restraints    Pre-Treatment Position in bed  -HC     Post Treatment Position chair  -HC     In Chair notified nsg;call light within reach;encouraged to call for assist;exit alarm on  -HC           User Key  (r) = Recorded By, (t) = Taken By, (c) = Cosigned By    Initials Name Provider Type    HC Devika Shell, PT Physical Therapist               Outcome Measures     Row Name 05/08/22 1237          How much help from another person do you currently need...    Turning from your back to your side while in flat bed without using bedrails? 2  -HC     Moving from lying on back to sitting on the side of a flat bed without bedrails? 2  -HC     Moving to and from a bed to a chair (including a wheelchair)? 2  -HC     Standing up from a chair using your arms (e.g., wheelchair, bedside chair)? 2  -HC     Climbing 3-5 steps with a railing? 1  -HC     To walk in hospital room? 2  -HC     AM-PAC 6 Clicks Score (PT) 11  -HC     Highest level of mobility 4 --> Transferred to chair/commode  -     Row Name 05/08/22 1237          Modified [place] Scale    Modified [place] Scale 4 - Moderately severe disability.  Unable to walk without assistance, and unable to attend to own bodily needs without assistance.  -     Row Name 05/08/22 1237          Functional Assessment    Outcome Measure Options AM-PAC 6 Clicks Basic Mobility (PT);Modified [place]  -           User Key  (r) = Recorded By, (t) = Taken  By, (c) = Cosigned By    Initials Name Provider Type     Devika Shell, PT Physical Therapist                             Physical Therapy Education                 Title: PT OT SLP Therapies (In Progress)     Topic: Physical Therapy (In Progress)     Point: Mobility training (In Progress)     Learning Progress Summary           Patient Acceptance, E, NR by  at 5/8/2022 1237                   Point: Precautions (In Progress)     Learning Progress Summary           Patient Acceptance, E, NR by  at 5/8/2022 1237                               User Key     Initials Effective Dates Name Provider Type Discipline     06/16/21 -  Devika Shell, PT Physical Therapist PT              PT Recommendation and Plan  Planned Therapy Interventions (PT): balance training, bed mobility training, gait training, motor coordination training, postural re-education, transfer training, patient/family education, strengthening, neuromuscular re-education, ROM (range of motion)  Plan of Care Reviewed With: patient  Outcome Evaluation: Pt is 75 yo female admitted for recent falls, weakness, AMS, impaired balance.  CT head (-) acute changes.  Xray bilateral knee (-), xray left hip (-) acute changes.  Pt presents with confusion and exhibits poor safety awareness.  Pt requiring Darci-modA for functional mobility due to weakness and impaired balance.  Pt presents in soiled sheets and exhibits incontinence with ambulation.  Pt not safe to return home due to high risk for falls and other mobility deficits.  Pt will need SNF/subacute rehab to address deficits.  PT will follow 5x/week while at [facility].     Time Calculation:    PT Charges     Row Name 05/08/22 1241             Time Calculation    Start Time 0942  -      Stop Time 1007  -      Time Calculation (min) 25 min  -      PT Received On 05/08/22  -      PT - Next Appointment 05/10/22  -      PT Goal Re-Cert Due Date 05/22/22  -              Time Calculation- PT    Total  Timed Code Minutes- PT 0 minute(s)  -            User Key  (r) = Recorded By, (t) = Taken By, (c) = Cosigned By    Initials Name Provider Type     Devika Shell, PT Physical Therapist              Therapy Charges for Today     Code Description Service Date Service Provider Modifiers Qty    [id number]  PT EVAL MOD COMPLEXITY 4 5/8/2022 Devika Shell, PT GP 1          PT G-Codes  Outcome Measure Options: AM-PAC 6 Clicks Basic Mobility (PT), Modified [place]  AM-PAC 6 Clicks Score (PT): 11  Modified [place] Scale: 4 - Moderately severe disability.  Unable to walk without assistance, and unable to attend to own bodily needs without assistance.    Devika Shell, PT  5/8/2022

## 2022-05-08 NOTE — PLAN OF CARE
Goal Outcome Evaluation:  Plan of Care Reviewed With: patient        Progress: no change  Outcome Evaluation: Pt resting in bed with eyes closed.  Pt requestee a shower at begining of shift.  When it was offered later she refused.  will continue with current orders care plans and monitoring.

## 2022-05-08 NOTE — PROGRESS NOTES
[facility] Medicine Services Daily Progress Note    Patient Name: Odilia Zuniga  : 1945  MRN: [id number]  Primary Care Physician:  Zonia Ch DO  Date of admission: 2022      Subjective      Chief Complaint: Generalized weakness.    Subjective   Patient Reports feeling weak, denies for any nausea or vomiting, no chest pain    Review of Systems   All other systems reviewed and are negative.      Objective      Vitals:   Temp:  [96 °F (35.6 °C)-98.4 °F (36.9 °C)] 98.3 °F (36.8 °C)  Heart Rate:  [57-68] 60  Resp:  [16-18] 16  BP: (100-171)/(56-78) 100/56    Physical Exam  Vitals and nursing note reviewed.   Constitutional:       General: She is not in acute distress.     Appearance: Normal appearance. She is well-developed. She is not ill-appearing, toxic-appearing or diaphoretic.   HENT:      Head: Normocephalic and atraumatic.      Right Ear: Ear canal and external ear normal.      Left Ear: Ear canal and external ear normal.      Nose: Nose normal. No congestion or rhinorrhea.      Mouth/Throat:      Mouth: Mucous membranes are moist.      Pharynx: No oropharyngeal exudate.   Eyes:      General: No scleral icterus.        Right eye: No discharge.         Left eye: No discharge.      Extraocular Movements: Extraocular movements intact.      Conjunctiva/sclera: Conjunctivae normal.      Pupils: Pupils are equal, round, and reactive to light.   Neck:      Thyroid: No thyromegaly.      Vascular: No carotid bruit or JVD.      Trachea: No tracheal deviation.   Cardiovascular:      Rate and Rhythm: Normal rate and regular rhythm.      Pulses: Normal pulses.      Heart sounds: Normal heart sounds. No murmur heard.    No friction rub. No gallop.   Pulmonary:      Effort: Pulmonary effort is normal. No respiratory distress.      Breath sounds: Normal breath sounds. No stridor. No wheezing, rhonchi or rales.   Chest:      Chest wall: No tenderness.   Abdominal:      General:  Bowel sounds are normal. There is no distension.      Palpations: Abdomen is soft. There is no mass.      Tenderness: There is no abdominal tenderness. There is no guarding or rebound.      Hernia: No hernia is present.   Musculoskeletal:         General: No swelling, tenderness, deformity or signs of injury. Normal range of motion.      Cervical back: Normal range of motion and neck supple. No rigidity. No muscular tenderness.      Right lower leg: No edema.      Left lower leg: No edema.   Lymphadenopathy:      Cervical: No cervical adenopathy.   Skin:     General: Skin is warm and dry.      Coloration: Skin is not jaundiced or pale.      Findings: No bruising, erythema or rash.   Neurological:      General: No focal deficit present.      Mental Status: She is alert and oriented to person, place, and time. Mental status is at baseline.      Cranial Nerves: No cranial nerve deficit.      Sensory: No sensory deficit.      Motor: No weakness or abnormal muscle tone.      Coordination: Coordination normal.   Psychiatric:         Mood and Affect: Mood normal.         Behavior: Behavior normal.         Thought Content: Thought content normal.         Judgment: Judgment normal.             Result Review    Result Review:  I have personally reviewed the results from the time of this admission to 5/8/2022 14:22 EDT and agree with these findings:  [x]  Laboratory  [x]  Microbiology  [x]  Radiology  []  EKG/Telemetry   []  Cardiology/Vascular   []  Pathology  []  Old records  []  Other:  Most notable findings include:         Assessment/Plan      Brief Patient Summary:  Odilia Zuniga is a 76 y.o. female who       celecoxib, 100 mg, Oral, Daily  cholecalciferol, 1,000 Units, Oral, Daily  citalopram, 40 mg, Oral, Daily  heparin (porcine), 5,000 Units, Subcutaneous, Q12H  metoprolol tartrate, 25 mg, Oral, Daily  montelukast, 10 mg, Oral, Nightly  triamterene-hydrochlorothiazide, 1 tablet, Oral, Daily             Active  Hospital Problems:  Active Hospital Problems    Diagnosis    • Fall, initial encounter      Plan:     Severe debility secondary to generalized weakness / physical deconditioning.  Vital signs per unit policy  Telemetry  EKG showed normal sinus no ST changes  CIP's were within normal limits  Patient has been showing progressing severe signs of generalized weakness and debility for the past 5 years, I think it is time for family and the patient to sit down and talk about a more well suited living experience for her as a assisted living home  CT head showed no acute abnormality  Respiratory panel PCR was negative  Pt will benefit from rehab, awaited PT OT input.  consult for safe discharge planning.       Hypertension  Vital signs per unit policy  We will start hydralazine 5 mg IV for systolic blood pressure greater than 160  Concerning patient's age and debility I think moderate hypertension 140-160 is ideal  Start patient's at home Maxide 37.5/25 mg tablet daily.     Osteoporosis  Start patient's at home Colie Calciferol  Patient's celecoxib 100 mg daily     MDD  Start patient citalopram 40 mg daily     DVT prophylaxis:    Okay to discharge to rehab once arrangements are done.    DVT prophylaxis:  Medical DVT prophylaxis orders are present.    CODE STATUS:    Code Status (Patient has no pulse and is not breathing): CPR (Attempt to Resuscitate)  Medical Interventions (Patient has pulse or is breathing): Full Support      Disposition:  I expect patient to be discharged as per clinical course.    This patient has been examined wearing appropriate Personal Protective Equipment and discussed with hospital infection control department. 05/08/22      Electronically signed by Altagracia Kruse MD, 05/08/22, 14:22 EDT.  [facility] Hospitalist Team

## 2022-05-09 PROBLEM — E43 SEVERE MALNUTRITION: Status: ACTIVE | Noted: 2022-05-09

## 2022-05-09 PROCEDURE — 25010000002 HEPARIN (PORCINE) PER 1000 UNITS: Performed by: HOSPITALIST

## 2022-05-09 PROCEDURE — G0378 HOSPITAL OBSERVATION PER HR: HCPCS

## 2022-05-09 PROCEDURE — 99232 SBSQ HOSP IP/OBS MODERATE 35: CPT | Performed by: INTERNAL MEDICINE

## 2022-05-09 PROCEDURE — 97167 OT EVAL HIGH COMPLEX 60 MIN: CPT

## 2022-05-09 RX ADMIN — CITALOPRAM HYDROBROMIDE 40 MG: 20 TABLET, FILM COATED ORAL at 09:29

## 2022-05-09 RX ADMIN — MONTELUKAST 10 MG: 10 TABLET, FILM COATED ORAL at 21:53

## 2022-05-09 RX ADMIN — CELECOXIB 100 MG: 100 CAPSULE ORAL at 09:27

## 2022-05-09 RX ADMIN — HEPARIN SODIUM 5000 UNITS: 5000 INJECTION, SOLUTION INTRAVENOUS; SUBCUTANEOUS at 09:31

## 2022-05-09 RX ADMIN — HEPARIN SODIUM 5000 UNITS: 5000 INJECTION, SOLUTION INTRAVENOUS; SUBCUTANEOUS at 21:53

## 2022-05-09 RX ADMIN — ACETAMINOPHEN 650 MG: 325 TABLET ORAL at 09:35

## 2022-05-09 RX ADMIN — METOPROLOL TARTRATE 25 MG: 25 TABLET, FILM COATED ORAL at 09:27

## 2022-05-09 RX ADMIN — TRIAMTERENE AND HYDROCHLOROTHIAZIDE 1 TABLET: 37.5; 25 TABLET ORAL at 09:31

## 2022-05-09 RX ADMIN — Medication 1000 UNITS: at 09:27

## 2022-05-09 NOTE — THERAPY EVALUATION
Patient Name: Odilia Driverngway  : 1945    MRN: [id number]                              Today's Date: 2022       Admit Date: 2022    Visit Dx:     ICD-10-CM ICD-9-CM   1. Fall, initial encounter  W19.XXXA E888.9   2. Generalized weakness  R53.1 780.79   3. Injury of head, initial encounter  S09.90XA 959.01   4. Acute pain of both knees  M25.561 338.19    M25.562 719.46   5. Left hip pain  M25.552 719.45   6. Balance problem  R26.89 781.99     Patient Active Problem List   Diagnosis   • Fall, initial encounter     Past Medical History:   Diagnosis Date   • Hypertension      No past surgical history on file.   General Information     Row Name 22          OT Time and Intention    Document Type evaluation  -ES     Mode of Treatment occupational therapy  -ES     Row Name 22          General Information    Patient Profile Reviewed yes  -ES     Existing Precautions/Restrictions fall  -ES     Barriers to Rehab cognitive status  -ES     Row Name 22          Occupational Profile    Reason for Services/Referral (Occupational Profile) Pt is 77 yo female admitted for recent falls, weakness, AMS, impaired balance.  CT head (-) acute changes.  Xray bilateral knee (-), xray left hip (-) acute changes. Pt appears to be poor historian, and no family is present to provide PLOF.  -ES     Row Name 22          Living Environment    People in Home --  unable to determine  -ES     Row Name 22          Cognition    Orientation Status (Cognition) oriented to;person  Pt able to state name. Has difficulty with . Unable to complete Short-Blessed due to delayed response time.  -ES     Row Name 22          Safety Issues, Functional Mobility    Impairments Affecting Function (Mobility) balance;cognition;postural/trunk control;endurance/activity tolerance;strength;pain  -ES     Cognitive Impairments, Mobility Safety/Performance impulsivity;insight into  deficits/self-awareness;problem-solving/reasoning;judgment;safety precaution awareness;safety precaution follow-through  -ES           User Key  (r) = Recorded By, (t) = Taken By, (c) = Cosigned By    Initials Name Provider Type    Beata Saenz OT Occupational Therapist                 Mobility/ADL's     Row Name 05/09/22 0952          Bed Mobility    Bed Mobility supine-sit  -ES     Supine-Sit [place] (Bed Mobility) maximum assist (25% patient effort)  -ES     Row Name 05/09/22 0952          Transfers    Transfers sit-stand transfer;stand-sit transfer  -ES     Sit-Stand [place] (Transfers) moderate assist (50% patient effort)  -ES     Row Name 05/09/22 0952          Sit-Stand Transfer    Assistive Device (Sit-Stand Transfers) walker, front-wheeled  -ES     Row Name 05/09/22 0952          Activities of Daily Living    BADL Assessment/Intervention upper body dressing;lower body dressing  -ES     Row Name 05/09/22 0952          Upper Body Dressing Assessment/Training    [place] Level (Upper Body Dressing) moderate assist (50% patient effort)  -ES     Row Name 05/09/22 0952          Lower Body Dressing Assessment/Training    [place] Level (Lower Body Dressing) maximum assist (25% patient effort);dependent (less than 25% patient effort)  -ES           User Key  (r) = Recorded By, (t) = Taken By, (c) = Cosigned By    Initials Name Provider Type    Beata Saenz OT Occupational Therapist               Obj/Interventions     Row Name 05/09/22 0957          Sensory Assessment (Somatosensory)    Sensory Assessment (Somatosensory) sensation intact  -     Sensory Assessment Grossly WFL  -ES     Row Name 05/09/22 0957          Vision Assessment/Intervention    Visual Impairment/Limitations corrective lenses full-time  -ES     Row Name 05/09/22 0957          Range of Motion Comprehensive    Comment, General Range of Motion BUE Grossly WFL  -ES     Row Name 05/09/22 0957          Strength  Comprehensive (MMT)    Comment, General Manual Muscle Testing (MMT) Assessment BUE Grossly 3+/5  -ES     Row Name 05/09/22 0957          Balance    Balance Assessment sitting dynamic balance;sitting static balance;standing static balance  -ES     Static Sitting Balance minimal assist  -ES     Dynamic Sitting Balance minimal assist;moderate assist  -ES     Static Standing Balance moderate assist  -ES     Balance Interventions sitting;standing;dynamic;static  -ES           User Key  (r) = Recorded By, (t) = Taken By, (c) = Cosigned By    Initials Name Provider Type    Beata Saenz OT Occupational Therapist               Goals/Plan     Row Name 05/09/22 1007          Dressing Goal 1 (OT)    Activity/Device (Dressing Goal 1, OT) dressing skills, all  -ES     [place]/Cues Needed (Dressing Goal 1, OT) minimum assist (75% or more patient effort)  -ES     Time Frame (Dressing Goal 1, OT) 2 weeks  -ES     Row Name 05/09/22 1007          Toileting Goal 1 (OT)    Activity/Device (Toileting Goal 1, OT) toileting skills, all  -ES     [place] Level/Cues Needed (Toileting Goal 1, OT) minimum assist (75% or more patient effort)  -ES     Time Frame (Toileting Goal 1, OT) 2 weeks  -ES     Row Name 05/09/22 1007          Strength Goal 1 (OT)    Strength Goal 1 (OT) BUE 4+/5  -ES     Time Frame (Strength Goal 1, OT) 2 weeks  -ES     Row Name 05/09/22 1007          Therapy Assessment/Plan (OT)    Planned Therapy Interventions (OT) activity tolerance training;BADL retraining;functional balance retraining;cognitive/visual perception retraining;neuromuscular control/coordination retraining;occupation/activity based interventions;passive ROM/stretching;patient/caregiver education/training;strengthening exercise  -ES           User Key  (r) = Recorded By, (t) = Taken By, (c) = Cosigned By    Initials Name Provider Type    Beata Saenz OT Occupational Therapist               Clinical Impression     Row Name  22          Pain Assessment    Pretreatment Pain Rating 5/10  -ES     Posttreatment Pain Rating 5/10  -ES     Row Name 22          Pain Scale: FACES Pre/Post-Treatment    Pain: FACES Scale, Pretreatment 4-->hurts little more  -ES     Posttreatment Pain Rating 4-->hurts little more  -ES     Pre/Posttreatment Pain Comment BLE  -ES     Row Name 22          Plan of Care Review    Plan of Care Reviewed With patient  -ES     Outcome Evaluation Pt is 75 yo female admitted for recent falls, weakness, AMS, impaired balance.  CT head (-) acute changes.  Xray bilateral knee (-), xray left hip (-) acute changes. Pt appears to be poor historian, and no family is present to provide PLOF. Pt supine in bed upon OT arrival, c/o nausea this AM but agreeable to brief OT treatment. Pt oriented to self and , but unable to complete additional cognitive testing. Pt limited by pain in BLE, and acts impulsively to reduce pain.  Demos poor awareness of deficits and limited safety awareness. Requires MOd-Max A for bed mobility and sit<>stand. C/o nausea and pain in BLE. Pt demos fair UB ROM, but generalized weakness. Declines transfer to chair and request return to supine due to nausea. She is below perceived PLOF, and will require sub-acute rehab at discharge.  -ES     Row Name 22          Therapy Assessment/Plan (OT)    Rehab Potential (OT) good, to achieve stated therapy goals  -ES     Therapy Frequency (OT) 3 times/wk  -ES     Row Name 22          Therapy Plan Review/Discharge Plan (OT)    Anticipated Discharge Disposition (OT) skilled nursing facility  -ES     Row Name 22          Vital Signs    O2 Delivery Pre Treatment room air  -ES     O2 Delivery Intra Treatment room air  -ES     O2 Delivery Post Treatment room air  -ES     Pre Patient Position Supine  -ES     Intra Patient Position Standing  -ES     Post Patient Position Supine  -ES     Row Name 22           Positioning and Restraints    Pre-Treatment Position in bed  -ES     Post Treatment Position bed  -ES     In Bed notified nsg;call light within reach;encouraged to call for assist;exit alarm on  -ES           User Key  (r) = Recorded By, (t) = Taken By, (c) = Cosigned By    Initials Name Provider Type    Beata Saenz OT Occupational Therapist               Outcome Measures     Row Name 05/09/22 1008          How much help from another is currently needed...    Putting on and taking off regular lower body clothing? 1  -ES     Bathing (including washing, rinsing, and drying) 2  -ES     Toileting (which includes using toilet bed pan or urinal) 1  -ES     Putting on and taking off regular upper body clothing 2  -ES     Taking care of personal grooming (such as brushing teeth) 3  -ES     Eating meals 3  -ES     AM-PAC 6 Clicks Score (OT) 12  -ES     Row Name 05/09/22 1008          Functional Assessment    Outcome Measure Options AM-PAC 6 Clicks Daily Activity (OT)  -ES           User Key  (r) = Recorded By, (t) = Taken By, (c) = Cosigned By    Initials Name Provider Type    Beata Saenz OT Occupational Therapist                  OT Recommendation and Plan  Planned Therapy Interventions (OT): activity tolerance training, BADL retraining, functional balance retraining, cognitive/visual perception retraining, neuromuscular control/coordination retraining, occupation/activity based interventions, passive ROM/stretching, patient/caregiver education/training, strengthening exercise  Therapy Frequency (OT): 3 times/wk  Plan of Care Review  Plan of Care Reviewed With: patient  Outcome Evaluation: Pt is 77 yo female admitted for recent falls, weakness, AMS, impaired balance.  CT head (-) acute changes.  Xray bilateral knee (-), xray left hip (-) acute changes. Pt appears to be poor historian, and no family is present to provide PLOF. Pt supine in bed upon OT arrival, c/o nausea this AM but agreeable to  brief OT treatment. Pt oriented to self and , but unable to complete additional cognitive testing. Pt limited by pain in BLE, and acts impulsively to reduce pain.  Demos poor awareness of deficits and limited safety awareness. Requires MOd-Max A for bed mobility and sit<>stand. C/o nausea and pain in BLE. Pt demos fair UB ROM, but generalized weakness. Declines transfer to chair and request return to supine due to nausea. She is below perceived PLOF, and will require sub-acute rehab at discharge.     Time Calculation:    Time Calculation- OT     Row Name 22 1009             Time Calculation- OT    OT Start Time 920  -ES      OT Stop Time 948  -ES      OT Time Calculation (min) 28 min  -ES      Total Timed Code Minutes- OT 0 minute(s)  -ES      OT Received On 22  -ES      OT - Next Appointment 22  -ES      OT Goal Re-Cert Due Date 22  -ES            User Key  (r) = Recorded By, (t) = Taken By, (c) = Cosigned By    Initials Name Provider Type    ES Beata Light OT Occupational Therapist              Therapy Charges for Today     Code Description Service Date Service Provider Modifiers Qty    94081441299 HC OT EVAL HIGH COMPLEXITY 4 2022 Beata Light OT GO 1               Beata Light OT  2022

## 2022-05-09 NOTE — PLAN OF CARE
Goal Outcome Evaluation:              Outcome Evaluation: Case management is on board. Will continue to observe.

## 2022-05-09 NOTE — PROGRESS NOTES
[facility] Medicine Services Daily Progress Note    Patient Name: Odilia Zuniga  : 1945  MRN: [id number]  Primary Care Physician:  Zonia Ch DO  Date of admission: 2022          Subjective     76-year-old frail old frail-appearing female who with history of hypertension.  Patient presented to [facility] ED on 2022 complaining of generalized weakness/debility.  Apparently, patient lives with her son.  She denies any fall or traumatic injury.  No chest pain, shortness of breath, no dizziness or lightheadedness.  She stated that its difficult to block from activities of daily living.  She is admitted with generalized weakness/deconditioning.    2022: Seen and examined in follow-up.  Resting in bed in no acute distress or discomfort.    Objective      Vitals:   Temp:  [96 °F (35.6 °C)-98.3 °F (36.8 °C)] 98.3 °F (36.8 °C)  Heart Rate:  [60-66] 63  Resp:  [16-18] 18  BP: ()/(56-80) 157/80    Physical Exam  Constitutional:       General: She is not in acute distress.     Appearance: Normal appearance. She is not ill-appearing.   HENT:      Head: Normocephalic.      Right Ear: Tympanic membrane normal.      Mouth/Throat:      Mouth: Mucous membranes are moist.   Eyes:      Pupils: Pupils are equal, round, and reactive to light.   Cardiovascular:      Rate and Rhythm: Normal rate.      Pulses: Normal pulses.   Pulmonary:      Effort: Pulmonary effort is normal.   Abdominal:      General: Abdomen is flat.   Musculoskeletal:         General: No swelling or tenderness. Normal range of motion.      Cervical back: Normal range of motion and neck supple.   Skin:     General: Skin is warm.   Neurological:      General: No focal deficit present.      Mental Status: She is alert.            Result Review    Result Review:  I have personally reviewed the results from the time of this admission to 2022 09:32 EDT and agree with these findings:  []  Laboratory  []   Microbiology  []  Radiology  []  EKG/Telemetry   []  Cardiology/Vascular   []  Pathology  []  Old records  []  Other:        Assessment/Plan      celecoxib, 100 mg, Oral, Daily  cholecalciferol, 1,000 Units, Oral, Daily  citalopram, 40 mg, Oral, Daily  heparin (porcine), 5,000 Units, Subcutaneous, Q12H  metoprolol tartrate, 25 mg, Oral, Daily  montelukast, 10 mg, Oral, Nightly  triamterene-hydrochlorothiazide, 1 tablet, Oral, Daily             Active Hospital Problems:  Active Hospital Problems    Diagnosis    • Fall, initial encounter      Generalized weakness/debility     -likely multifactorial, check TSH and continue PT/OT as tolerated     -CM on board to assist with possible placement       Hypertension      -on Lopressor/Maxide     History of osteoporosis        -check vitamin D level and continue current replacement therapy    Depression, recurrent     -on Celexa        DVT prophylaxis:  Medical DVT prophylaxis orders are present.    CODE STATUS:    Code Status (Patient has no pulse and is not breathing): CPR (Attempt to Resuscitate)  Medical Interventions (Patient has pulse or is breathing): Full Support      Disposition:  I expect patient to be discharged 24 to 48 hours      Electronically signed by Carlos Enrique Cole MD, 05/09/22, 09:32 EDT.  [facility] Hospitalist Team

## 2022-05-09 NOTE — PLAN OF CARE
Goal Outcome Evaluation:  Plan of Care Reviewed With: patient           Outcome Evaluation: Pt is 77 yo female admitted for recent falls, weakness, AMS, impaired balance.  CT head (-) acute changes.  Xray bilateral knee (-), xray left hip (-) acute changes. Pt appears to be poor historian, and no family is present to provide PLOF. Pt supine in bed upon OT arrival, c/o nausea this AM but agreeable to brief OT treatment. Pt oriented to self and , but unable to complete additional cognitive testing. Pt limited by pain in BLE, and acts impulsively to reduce pain.  Demos poor awareness of deficits and limited safety awareness. Requires MOd-Max A for bed mobility and sit<>stand. C/o nausea and pain in BLE. Pt demos fair UB ROM, but generalized weakness. Declines transfer to chair and request return to supine due to nausea. She is below perceived PLOF, and will require sub-acute rehab at discharge.

## 2022-05-09 NOTE — CASE MANAGEMENT/SOCIAL WORK
Discharge Planning Assessment  [facility]     Patient Name: Odilia Zuniga  MRN: [id number]  Today's Date: 5/9/2022    Admit Date: 5/6/2022     Discharge Needs Assessment     Row Name 05/09/22 1553       Living Environment    People in Home sibling(s)    Name(s) of People in Home Lainey    Current Living Arrangements home    Primary Care Provided by other (see comments)  Lainey    Provides Primary Care For no one, unable/limited ability to care for self    Family Caregiver if Needed child(emi), adult    Family Caregiver Names SonLuli, Daughter in Law-Karla    Quality of Family Relationships helpful;involved;supportive    Able to Return to Prior Arrangements yes       Resource/Environmental Concerns    Resource/Environmental Concerns none    Transportation Concerns none       Transition Planning    Patient/Family Anticipates Transition to inpatient rehabilitation facility    Patient/Family Anticipated Services at Transition rehabilitation services;skilled nursing    Transportation Anticipated family or friend will provide;health plan transportation       Discharge Needs Assessment    Readmission Within the Last 30 Days no previous admission in last 30 days    Equipment Currently Used at Home cane, quad    Concerns to be Addressed care coordination/care conferences;discharge planning    Anticipated Changes Related to Illness none    Equipment Needed After Discharge none    Outpatient/Agency/Support Group Needs skilled nursing facility    Discharge Facility/Level of Care Needs nursing facility, skilled    Provided Post Acute Provider List? Yes    Post Acute Provider List Inpatient Rehab;Nursing Home    Delivered To Support Person    Support Person Laith    Method of Delivery Other (comment)  E-mail               Discharge Plan     Row Name 05/09/22 1552       Plan    Plan DC plan: from home with sister. Additional rehab/SNF choices pending. No precert required. PASRR per facility.     Patient/Family in Agreement with Plan yes    Plan Comments Called and spoke to patient's son Breezy who completed CM assessment. Pt has been living with elderly sister in [place] but sister is no longer able to continue to care for patient. Son and daughter in law provide transportation PCP and pharmacy confirmed. Pt uses a quad cane. CM notified by [facility] mireya Swann that pt is not appropriate for acute rehab. Emailed list for son to review. Additional choices pending. Notified by [facility] liaangelita Sams that pt is current with their services.              Continued Care and Services - Admitted Since 5/6/2022     Destination     Service Provider Request Status Selected Services Address Phone Fax Patient Preferred    [facility]  Declined  Patient Denial N/A [address] [facility] [place] [address] [phone] --          Home Medical Care     Service Provider Request Status Selected Services Address Phone Fax Patient Preferred    [facility] [place]  Accepted N/A [address] [facility] [place] [id number] [phone] --               Demographic Summary     Row Name 05/09/22 1553       General Information    Admission Type observation    Arrived From emergency department    Referral Source admission list    Reason for Consult discharge planning;care coordination/care conference    Preferred Language English       Contact Information    Permission Granted to Share Info With                Functional Status     Row Name 05/09/22 1553       Functional Status    Usual Activity Tolerance moderate    Current Activity Tolerance fair       Functional Status, IADL    Medications assistive equipment and person    Meal Preparation assistive equipment and person    Housekeeping assistive equipment and person    Laundry assistive equipment and person    Shopping assistive equipment and person              Phone communication  or documentation only - no physical contact with patient or family.      Megan Naegele, RN      Office Phone: [phone]  Office Cell: [phone]

## 2022-05-10 LAB
HOLD SPECIMEN: NORMAL
WHOLE BLOOD HOLD SPECIMEN: NORMAL

## 2022-05-10 PROCEDURE — 97530 THERAPEUTIC ACTIVITIES: CPT

## 2022-05-10 PROCEDURE — 25010000002 HEPARIN (PORCINE) PER 1000 UNITS: Performed by: HOSPITALIST

## 2022-05-10 PROCEDURE — 97116 GAIT TRAINING THERAPY: CPT

## 2022-05-10 PROCEDURE — 99232 SBSQ HOSP IP/OBS MODERATE 35: CPT | Performed by: INTERNAL MEDICINE

## 2022-05-10 PROCEDURE — 97535 SELF CARE MNGMENT TRAINING: CPT

## 2022-05-10 PROCEDURE — 82652 VIT D 1 25-DIHYDROXY: CPT | Performed by: INTERNAL MEDICINE

## 2022-05-10 PROCEDURE — 25010000002 HYDRALAZINE PER 20 MG: Performed by: FAMILY MEDICINE

## 2022-05-10 RX ADMIN — ACETAMINOPHEN 650 MG: 325 TABLET ORAL at 04:46

## 2022-05-10 RX ADMIN — Medication 1000 UNITS: at 08:26

## 2022-05-10 RX ADMIN — ACETAMINOPHEN 650 MG: 325 TABLET ORAL at 21:15

## 2022-05-10 RX ADMIN — METOPROLOL TARTRATE 25 MG: 25 TABLET, FILM COATED ORAL at 08:26

## 2022-05-10 RX ADMIN — TRIAMTERENE AND HYDROCHLOROTHIAZIDE 1 TABLET: 37.5; 25 TABLET ORAL at 08:26

## 2022-05-10 RX ADMIN — CELECOXIB 100 MG: 100 CAPSULE ORAL at 08:26

## 2022-05-10 RX ADMIN — CITALOPRAM HYDROBROMIDE 40 MG: 20 TABLET, FILM COATED ORAL at 08:26

## 2022-05-10 RX ADMIN — Medication 10 ML: at 21:19

## 2022-05-10 RX ADMIN — Medication 10 ML: at 08:26

## 2022-05-10 RX ADMIN — MONTELUKAST 10 MG: 10 TABLET, FILM COATED ORAL at 21:16

## 2022-05-10 RX ADMIN — HEPARIN SODIUM 5000 UNITS: 5000 INJECTION, SOLUTION INTRAVENOUS; SUBCUTANEOUS at 08:26

## 2022-05-10 RX ADMIN — HEPARIN SODIUM 5000 UNITS: 5000 INJECTION, SOLUTION INTRAVENOUS; SUBCUTANEOUS at 21:15

## 2022-05-10 RX ADMIN — HYDRALAZINE HYDROCHLORIDE 5 MG: 20 INJECTION INTRAMUSCULAR; INTRAVENOUS at 04:46

## 2022-05-10 NOTE — THERAPY TREATMENT NOTE
"Subjective: Pt agreeable to therapeutic plan of care.    Objective:     Bed mobility - Min-A supine to sit  Transfers - Min-A, Mod-A and with rolling walker  Min assist off edge of the bed and Mod-assist off low toilet  Ambulation - 15 feet and 40 feet Min-A and with rolling walker    Pain: 8 VAS no pain at rest but painful bilateral knees during gait training.  Pain subsides at rest.  Education: Provided education on importance of mobility and skilled verbal / tactile cueing throughout intervention.     Assessment: Odilia Zuniga presents with functional mobility impairments which indicate the need for skilled intervention. Tolerating session today without incident. Will continue to follow and progress as tolerated.     Plan/Recommendations:   Moderate Intensity Therapy recommended post-acute care. This is recommended as therapy feels the patient would require 3-4 days per week and wouldn't tolerate \"3 hour daily\" rehab intensity. SNF would be the preferred choice. If the patient does not agree to SNF, arrange HH or OP depending on home bound status. If patient is medically complex, consider LTACH.. Pt requires no DME at discharge.     Pt desires Skilled Rehab placement at discharge. Pt cooperative; agreeable to therapeutic recommendations and plan of care.         Basic Mobility 6-click:  Rollin = Total, A lot = 2, A little = 3; 4 = None  Supine>Sit:   1 = Total, A lot = 2, A little = 3; 4 = None   Sit>Stand with arms:  1 = Total, A lot = 2, A little = 3; 4 = None  Bed>Chair:   1 = Total, A lot = 2, A little = 3; 4 = None  Ambulate in room:  1 = Total, A lot = 2, A little = 3; 4 = None  3-5 Steps with railin = Total, A lot = 2, A little = 3; 4 = None  Score: 15    Modified Osmel: 4 = Moderately severe disability (Unable to attend to own bodily needs without assistance, and unable to walk unassisted)     Post-Tx Position: Up in Chair, Alarms activated and Call light and personal items within " reach  PPE: gloves, surgical mask, eyewear protection

## 2022-05-10 NOTE — PLAN OF CARE
Goal Outcome Evaluation:         Pt rested well overnight with no new complaints. Pt awaiting placement for rehab.         70 year old  with history of HTN, HLD ,BPH presents with left inguinal hernia that has been apparent for sometime. Pt states it causes difficulty at work and wants it repaired. Pt schedule for left inguinal hernia repair with mesh on 10/21/2020 History obtained through language line Beckie #[id number]

## 2022-05-10 NOTE — PROGRESS NOTES
Nutrition Services    Patient Name: Odilia Zuniga  YOB: 1945  MRN: [id number]  Admission date: 5/6/2022    PPE Documentation        PPE Worn By Provider Did not enter room for this encounter   PPE Worn By Patient  N/A     PROGRESS NOTE      Encounter Information: 5/10: Progress note to monitor po intake. PO intakes greater than 50% on average, encourage use of supplements.        PO Diet: Diet Regular   PO Supplements: Boost Plus TID (provides 1080 kcals, 42 g protein if consumed)   Magic Cups at lunch/dinner (Provides 580 kcals, 18 g protein if consumed)       PO Intake:  58% last 7 meals       Nutrition support orders:    Nutrition support review:        Labs (reviewed below): BUN/Creat elev        GI Function:  Stool Output  Stool Unmeasured Occurrence: 0 (05/09/22 1336)  Bowel Incontinence: No (05/09/22 1336)  Stool Amount: small (05/07/22 1623)          Nutrition Intervention: Regular diet  Boost Plus TID (provides 1080 kcals, 42 g protein if consumed)   Magic Cups at lunch/dinner (Provides 580 kcals, 18 g protein if consumed)           Results from last 7 days   Lab Units 05/06/22 1912   SODIUM mmol/L 136   POTASSIUM mmol/L 3.8   CHLORIDE mmol/L 98   CO2 mmol/L 29.0   BUN mg/dL 24*   CREATININE mg/dL 1.01*   CALCIUM mg/dL 9.1   BILIRUBIN mg/dL 0.3   ALK PHOS U/L 59   ALT (SGPT) U/L 7   AST (SGOT) U/L 15   GLUCOSE mg/dL 101*     Results from last 7 days   Lab Units 05/06/22 1912   MAGNESIUM mg/dL 1.7   HEMOGLOBIN g/dL 11.0*   HEMATOCRIT % 33.4*     COVID19   Date Value Ref Range Status   05/06/2022 Not Detected Not Detected - Ref. Range Final     No results found for: HGBA1C        RD to follow up per protocol.    Electronically signed by:  Tamy Hammond RD  05/10/22 12:41 EDT

## 2022-05-10 NOTE — PLAN OF CARE
Goal Outcome Evaluation:         Patient needs rehab placement. Family has been given list of facilities from .

## 2022-05-10 NOTE — PROGRESS NOTES
[facility] Medicine Services Daily Progress Note    Patient Name: Odilia Zuniga  : 1945  MRN: [id number]  Primary Care Physician:  Zonia Ch DO  Date of admission: 2022      Subjective      76-year-old frail old frail-appearing female who with history of hypertension.  Patient presented to [facility] ED on 2022 complaining of generalized weakness/debility.  Apparently, patient lives with her son.  She denies any fall or traumatic injury.  No chest pain, shortness of breath, no dizziness or lightheadedness.  She stated that its difficult to block from activities of daily living.  She is admitted with generalized weakness/deconditioning.     2022: Seen and examined in follow-up.  Resting in bed in no acute distress or discomfort.    5/10/2022: Seen and examined in follow evaluation.  Resting in bed comfortably.  No complaints or events.       Objective      Vitals:   Temp:  [97.5 °F (36.4 °C)-98.2 °F (36.8 °C)] 97.5 °F (36.4 °C)  Heart Rate:  [56-72] 72  Resp:  [18-22] 18  BP: (109-206)/(52-77) 124/67    Physical Exam     General: She is not in acute distress.     Appearance: Normal appearance. She is not ill-appearing.   HENT:      Head: Normocephalic.      Right Ear: Tympanic membrane normal.      Mouth/Throat:      Mouth: Mucous membranes are moist.   Eyes:      Pupils: Pupils are equal, round, and reactive to light.   Cardiovascular:      Rate and Rhythm: Normal rate.      Pulses: Normal pulses.   Pulmonary:      Effort: Pulmonary effort is normal.   Abdominal:      General: Abdomen is flat.   Musculoskeletal:         General: No swelling or tenderness. Normal range of motion.      Cervical back: Normal range of motion and neck supple.   Skin:     General: Skin is warm.   Neurological:      General: No focal deficit present.      Mental Status: She is alert.          Result Review    Result Review:  I have personally reviewed the results from the time of this  admission to 5/10/2022 11:38 EDT and agree with these findings:  []  Laboratory  []  Microbiology  []  Radiology  []  EKG/Telemetry   []  Cardiology/Vascular   []  Pathology  []  Old records  []  Other:  Most notable findings include:       [unfilled]       celecoxib, 100 mg, Oral, Daily  cholecalciferol, 1,000 Units, Oral, Daily  citalopram, 40 mg, Oral, Daily  heparin (porcine), 5,000 Units, Subcutaneous, Q12H  metoprolol tartrate, 25 mg, Oral, Daily  montelukast, 10 mg, Oral, Nightly  triamterene-hydrochlorothiazide, 1 tablet, Oral, Daily             Active Hospital Problems:  Active Hospital Problems    Diagnosis    • Severe malnutrition (HCC)    • Fall, initial encounter      Plan:   Generalized weakness/debility     -likely multifactorial, check TSH and continue PT/OT as tolerated     -CM on board to assist with possible placement        Hypertension      -on Lopressor/Maxide      History of osteoporosis        -check vitamin D level and continue current replacement therapy     Depression, recurrent     -on Celexa       DVT prophylaxis:  Medical DVT prophylaxis orders are present.    CODE STATUS:    Code Status (Patient has no pulse and is not breathing): CPR (Attempt to Resuscitate)  Medical Interventions (Patient has pulse or is breathing): Full Support      Disposition:  I expect patient to be discharged  24-48 hrs.        Electronically signed by Carlos Enrique Cole MD, 05/10/22, 11:38 EDT.  [facility] Hospitalist Team

## 2022-05-10 NOTE — CASE MANAGEMENT/SOCIAL WORK
Continued Stay Note  [facility]     Patient Name: Odilia Zuniga  MRN: [id number]  Today's Date: 5/10/2022    Admit Date: 5/6/2022     Discharge Plan     Row Name 05/10/22 1635       Plan    Plan DC plan: from home with sister. Additional rehab/SNF choices pending. No precert required. PASRR per facility.    Plan Comments CM received phone call from patient's son Breezy who did not receive email with rehab list. Will provide rehab/SNF list to patient's bedside for son to  this evening. Family at bedside, explained that Breezy will be coming by later.              Met with patient in room wearing PPE: mask, face shield/goggles.      Maintained distance greater than six feet and spent less than 15 minutes in the room.      Megan Naegele, RN      Office Phone: [phone]  Office Cell: [phone]

## 2022-05-10 NOTE — PLAN OF CARE
"Goal Outcome Evaluation:     Bed mobility - Min-A supine to sit  Transfers - Min-A, Mod-A and with rolling walker  Min assist off edge of the bed and Mod-assist off low toilet  Ambulation - 15 feet and 40 feet Min-A and with rolling walker    Moderate Intensity Therapy recommended post-acute care. This is recommended as therapy feels the patient would require 3-4 days per week and wouldn't tolerate \"3 hour daily\" rehab intensity. SNF would be the preferred choice. If the patient does not agree to SNF, arrange HH or OP depending on home bound status. If patient is medically complex, consider LTACH.. Pt requires no DME at discharge.     Pt desires Skilled Rehab placement at discharge. Pt cooperative; agreeable to therapeutic recommendations and plan of care.            "

## 2022-05-11 VITALS
RESPIRATION RATE: 16 BRPM | BODY MASS INDEX: 14.82 KG/M2 | HEART RATE: 68 BPM | SYSTOLIC BLOOD PRESSURE: 119 MMHG | HEIGHT: 64 IN | WEIGHT: 86.8 LBS | OXYGEN SATURATION: 96 % | TEMPERATURE: 98 F | DIASTOLIC BLOOD PRESSURE: 77 MMHG

## 2022-05-11 LAB — 1,25(OH)2D SERPL-MCNC: 27.4 PG/ML (ref 19.9–79.3)

## 2022-05-11 PROCEDURE — 25010000002 HEPARIN (PORCINE) PER 1000 UNITS: Performed by: HOSPITALIST

## 2022-05-11 PROCEDURE — 97110 THERAPEUTIC EXERCISES: CPT

## 2022-05-11 PROCEDURE — 97535 SELF CARE MNGMENT TRAINING: CPT

## 2022-05-11 PROCEDURE — 99239 HOSP IP/OBS DSCHRG MGMT >30: CPT | Performed by: INTERNAL MEDICINE

## 2022-05-11 PROCEDURE — 97116 GAIT TRAINING THERAPY: CPT

## 2022-05-11 RX ORDER — DICYCLOMINE HYDROCHLORIDE 10 MG/1
20 CAPSULE ORAL 3 TIMES DAILY
Status: DISCONTINUED | OUTPATIENT
Start: 2022-05-11 | End: 2022-05-11 | Stop reason: HOSPADM

## 2022-05-11 RX ORDER — MEMANTINE HYDROCHLORIDE 5 MG/1
5 TABLET ORAL DAILY
Status: DISCONTINUED | OUTPATIENT
Start: 2022-05-11 | End: 2022-05-11 | Stop reason: HOSPADM

## 2022-05-11 RX ADMIN — CITALOPRAM HYDROBROMIDE 40 MG: 20 TABLET, FILM COATED ORAL at 08:33

## 2022-05-11 RX ADMIN — HEPARIN SODIUM 5000 UNITS: 5000 INJECTION, SOLUTION INTRAVENOUS; SUBCUTANEOUS at 08:33

## 2022-05-11 RX ADMIN — MEMANTINE HYDROCHLORIDE 5 MG: 5 TABLET ORAL at 11:23

## 2022-05-11 RX ADMIN — Medication 1000 UNITS: at 08:33

## 2022-05-11 RX ADMIN — METOPROLOL TARTRATE 25 MG: 25 TABLET, FILM COATED ORAL at 08:33

## 2022-05-11 RX ADMIN — TRIAMTERENE AND HYDROCHLOROTHIAZIDE 1 TABLET: 37.5; 25 TABLET ORAL at 08:33

## 2022-05-11 RX ADMIN — DICYCLOMINE HYDROCHLORIDE 20 MG: 10 CAPSULE ORAL at 11:24

## 2022-05-11 RX ADMIN — CELECOXIB 100 MG: 100 CAPSULE ORAL at 08:33

## 2022-05-11 NOTE — CASE MANAGEMENT/SOCIAL WORK
Continued Stay Note   [facility]     Patient Name: Odilia Zuniga  MRN: [id number]  Today's Date: 5/11/2022    Admit Date: 5/6/2022     Discharge Plan     Row Name 05/11/22 1142       Plan    Plan DC plan: accepted to [facility]. Bed available 5/11. No precert required. PASRR per facility.    Patient/Family in Agreement with Plan yes    Plan Comments CM received phone call from patient's son Breezy with choice of [facility]. No additional choices given at this time but encouraged son to continue to review. Sent referral in [facility] and to liaison Jing. Jing spoke to son and able to confirm acceptance. Secure chat sent to nursing and Dr Jensen. Pharmacy updated to [facility]. Son plans to pick patient up around 15:45 today.              Phone communication or documentation only - no physical contact with patient or family.      Megan Naegele, RN      Office Phone: [phone]  Office Cell: [phone]

## 2022-05-11 NOTE — THERAPY TREATMENT NOTE
"Subjective: Pt agreeable to therapeutic plan of care.  Cognition: oriented to Person, Place and Situation    Objective:     Bed Mobility: Min-A  Functional Transfers: Min-A  Functional Ambulation: Min-A, Mod-A and with rolling walker    Toileting: Mod-A  ADL Position: supported sitting  ADL Comments: BSC    Lower Body Dressing: Max-A  ADL Position: supported sitting  ADL Comments: socks    Pain: 4 VAS  Education: Provided education on importance of mobility and skilled verbal / tactile cueing throughout intervention.     Assessment: Odilia Zuniga presents with ADL impairments below baseline abilities which indicate the need for continued skilled intervention while inpatient. Pt finishing bathing task upon OT arrival, agreeable to OOB activity. Requires Min A for bed mobility, but Min-Mod A for transfer secondary to posterior lean.  Pt is Max A for LB ADLs, and oriented x3 this date. Completes grooming task with set up and LB dressing with max a. Tolerating session today without incident. Will continue to follow and progress as tolerated.     Plan/Recommendations:   Pt would benefit from Skilled Rehab placement at discharge from facility. Moderate Intensity Therapy recommended post-acute care. This is recommended as therapy feels the patient would require 3-4 days per week and wouldn't tolerate \"3 hour daily\" rehab intensity. SNF would be the preferred choice. If the patient does not agree to SNF, arrange HH or OP depending on home bound status. If patient is medically complex, consider LTACH.   Pt desires Skilled Rehab placement at discharge. Pt cooperative; agreeable to therapeutic recommendations and plan of care.     Post-Tx Position: Up in Chair, Alarms activated and Call light and personal items within reach  PPE: gloves, surgical mask, eyewear protection    "

## 2022-05-11 NOTE — DISCHARGE SUMMARY
[facility] Medicine Services  DISCHARGE SUMMARY    Patient Name: Odilia Zuniga  : 1945  MRN: [id number]    Date of Admission: 2022  Discharge Diagnosis:   1.  Debility/generalized weakness, improving  2.  Hypertension  3.  History of osteoporosis  4.  Depression    Date of Discharge: 2022  Primary Care Physician: Zonia Ch DO      Presenting Problem:   Balance problem [R26.89]  Left hip pain [M25.552]  Generalized weakness [R53.1]  Injury of head, initial encounter [S09.90XA]  Fall, initial encounter [W19.XXXA]  Acute pain of both knees [M25.561, M25.562]    Active and Resolved Hospital Problems:  Active Hospital Problems    Diagnosis POA   • Severe malnutrition (HCC) [E43] Yes   • Fall, initial encounter [W19.XXXA] Yes      Resolved Hospital Problems   No resolved problems to display.         Hospital Course     Hospital Course:  76-year-old pleasant frail appearing female with history of hypertension, and depression.  She presented to [facility] ED on 2022 complaining of generalized weakness/debility.  Apparently, patient lives with her son.  She denies any fall or traumatic injury.  No chest pain, shortness of breath,  dizziness or lightheadedness.  She stated that its difficult to performing activities of daily living.  Patient was admitted for generalized weakness/debility with poor oral intake and inability to perform daily activities.  CT of the head without contrast obtained in ED is unremarkable, and chest x-ray showed no acute cardiopulmonary findings.  UA and laboratories also nonrevealing.  Patient was admitted and managed expectantly.  She was seen in consultation by PT and patient received daily bedside treatment with gradual improvement.  The rest of her hospital course has been relatively unremarkable.   was consulted to assist with placement to rehab.  Prior to discharge today, she is afebrile with stable hemodynamics and  oxygen well on room air.  No chest pain, shortness of breath, dizziness or palpitation.    DISCHARGE Follow Up Recommendations for labs and diagnostics:   1.  Discharge to rehab for continue treatment          Reasons For Change In Medications and Indications for New Medications:      Day of Discharge     Vital Signs:  Temp:  [97.6 °F (36.4 °C)-99.7 °F (37.6 °C)] 98 °F (36.7 °C)  Heart Rate:  [64-68] 68  Resp:  [16-18] 16  BP: ()/(50-77) 119/77      Physical Exam   General: She is not in acute distress.     Appearance: Normal appearance. She is not ill-appearing.   HENT:      Head: Normocephalic.      Right Ear: Tympanic membrane normal.      Mouth/Throat:      Mouth: Mucous membranes are moist.   Eyes:      Pupils: Pupils are equal, round, and reactive to light.   Cardiovascular:      Rate and Rhythm: Normal rate.      Pulses: Normal pulses.   Pulmonary:      Effort: Pulmonary effort is normal.   Abdominal:      General: Abdomen is flat.   Musculoskeletal:         General: No swelling or tenderness. Normal range of motion.      Cervical back: Normal range of motion and neck supple.   Skin:     General: Skin is warm.   Neurological:      General: No focal deficit present.      Mental Status: She is alert.        Pertinent  and/or Most Recent Results     LAB RESULTS:      Lab 05/06/22 1912   WBC 5.30   HEMOGLOBIN 11.0*   HEMATOCRIT 33.4*   PLATELETS 261   NEUTROS ABS 3.20   LYMPHS ABS 1.60   MONOS ABS 0.40   EOS ABS 0.10   MCV 80.8         Lab 05/06/22 1912   SODIUM 136   POTASSIUM 3.8   CHLORIDE 98   CO2 29.0   ANION GAP 9.0   BUN 24*   CREATININE 1.01*   EGFR 57.8*   GLUCOSE 101*   CALCIUM 9.1   MAGNESIUM 1.7   TSH 0.822         Lab 05/06/22 1912   TOTAL PROTEIN 6.6   ALBUMIN 3.90   GLOBULIN 2.7   ALT (SGPT) 7   AST (SGOT) 15   BILIRUBIN 0.3   ALK PHOS 59         Lab 05/06/22 1912   TROPONIN T <0.010                 Brief Urine Lab Results  (Last result in the past 365 days)      Color   Clarity   Blood    Leuk Est   Nitrite   Protein   CREAT   Urine HCG        05/06/22 2007 Yellow   Clear   Negative   Trace   Negative   Negative               Microbiology Results (last 10 days)     Procedure Component Value - Date/Time    Respiratory Panel PCR w/COVID-19(SARS-CoV-2) EDDIE/NISHI/GLADYS/PAD/COR/MAD/EUGENE In-House, NP Swab in UTM/VTM, 3-4 HR TAT - Swab, Nasopharynx [[id number]]  (Normal) Collected: 05/06/22 1912    Lab Status: Final result Specimen: Swab from Nasopharynx Updated: 05/06/22 2005     ADENOVIRUS, PCR Not Detected     Coronavirus 229E Not Detected     Coronavirus HKU1 Not Detected     Coronavirus NL63 Not Detected     Coronavirus OC43 Not Detected     COVID19 Not Detected     Human Metapneumovirus Not Detected     Human Rhinovirus/Enterovirus Not Detected     Influenza A PCR Not Detected     Influenza B PCR Not Detected     Parainfluenza Virus 1 Not Detected     Parainfluenza Virus 2 Not Detected     Parainfluenza Virus 3 Not Detected     Parainfluenza Virus 4 Not Detected     RSV, PCR Not Detected     Bordetella pertussis pcr Not Detected     Bordetella parapertussis PCR Not Detected     Chlamydophila pneumoniae PCR Not Detected     Mycoplasma pneumo by PCR Not Detected    Narrative:      In the setting of a positive respiratory panel with a viral infection PLUS a negative procalcitonin without other underlying concern for bacterial infection, consider observing off antibiotics or discontinuation of antibiotics and continue supportive care. If the respiratory panel is positive for atypical bacterial infection (Bordetella pertussis, Chlamydophila pneumoniae, or Mycoplasma pneumoniae), consider antibiotic de-escalation to target atypical bacterial infection.          CT Head Without Contrast    Result Date: 5/6/2022  Impression: No acute intracranial abnormality. Volume loss and moderate amount of low-density periventricular subcortical white matter consistent chronic small vessel ischemic change. This is unchanged.  Brain MRI is more sensitive to evaluate for acute or subacute infarcts and to evaluate for intracranial metastatic disease.  Electronically Signed By-Griselda Richards MD On:5/6/2022 8:21 PM This report was finalized on [id number] by  Griselda Richards MD.    XR Knee 3 View Bilateral    Result Date: 5/6/2022  Impression: No fractures or dislocations of either knee. Osteopenia. Mild tricompartment joint space narrowing.  Electronically Signed By-Griselda Richards MD On:5/6/2022 7:59 PM This report was finalized on [id number] by  Griselda Richards MD.    XR Chest 1 View    Result Date: 5/6/2022  Impression: No acute cardiopulmonary abnormality.  Electronically Signed By-Griselda Richards MD On:5/6/2022 8:01 PM This report was finalized on [id number] by  Griselda Richards MD.    XR Hip With or Without Pelvis 2 - 3 View Left    Result Date: 5/6/2022  Impression: Osteopenia limits evaluation. No fractures are identified. Mild arthritis of both hips. If difficulty weightbearing or continued pain MRI of the pelvis is recommended to evaluate for radiographically occult injury.  Electronically Signed By-Griselda Richards MD On:5/6/2022 7:58 PM This report was finalized on [id number] by  Griselda Richards MD.      Results for orders placed during the hospital encounter of 09/10/19    Doppler ankle brachial index single level CAR    Interpretation Summary  · Right Conclusion: The right VERONICA is normal.  · Left Conclusion: The left VERONICA is normal.      Results for orders placed during the hospital encounter of 09/10/19    Doppler ankle brachial index single level CAR    Interpretation Summary  · Right Conclusion: The right VERONICA is normal.  · Left Conclusion: The left VERONICA is normal.          Labs Pending at Discharge:  Pending Labs     Order Current Status    Vitamin D 1,25 Dihydroxy In process          Procedures Performed           Consults:   Consults     No orders found from 4/7/2022 to 5/7/2022.            Discharge Details        Discharge Medications       New Medications      Instructions Start Date   metoprolol tartrate 25 MG tablet  Commonly known as: LOPRESSOR   25 mg, Oral, Daily   Start Date: May 12, 2022        Continue These Medications      Instructions Start Date   citalopram 40 MG tablet  Commonly known as: CeleXA   40 mg, Oral, Daily      dicyclomine 20 MG tablet  Commonly known as: BENTYL   20 mg, Oral, 3 Times Daily      memantine 5 MG tablet  Commonly known as: NAMENDA   5 mg, Oral, Daily      montelukast 10 MG tablet  Commonly known as: SINGULAIR   10 mg, Oral, Nightly         ASK your doctor about these medications      Instructions Start Date   Potassium 99 MG tablet   99 mg, Oral, Daily             Allergies   Allergen Reactions   • Penicillins Hives         Discharge Disposition:   Skilled Nursing Facility (DC - External)    Diet:  Hospital:  Diet Order   Procedures   • Diet Regular         Discharge Activity: Ad nhi. as tolerated        CODE STATUS:  Code Status and Medical Interventions:   Ordered at: 05/06/22 7771     Code Status (Patient has no pulse and is not breathing):    CPR (Attempt to Resuscitate)     Medical Interventions (Patient has pulse or is breathing):    Full Support         No future appointments.        Time spent on Discharge including face to face service:  > 30 min

## 2022-05-11 NOTE — THERAPY TREATMENT NOTE
"Subjective: Pt agreeable to therapeutic plan of care.     Objective:     Bed mobility - N/A or Not attempted. Up in chair on arrival  Transfers - CGA, cues for hand placement. Posterior lean and LOB upon initial standing requiring min A to correct.  Ambulation - 75 feet CGA, Min-A and with rolling walker   Gait:  Pt with narrow REYES, short step length, LOB with turns.     Exercise:  Sit to stand from chair x 5  Standing heel raises and mini squats x 10  Standing reaching activity single arm x 10 with walker for stability.     Pain: 0 VAS  Education: Provided education on importance of mobility and skilled verbal / tactile cueing throughout intervention.     Assessment: Odilia Zuniga presents with functional mobility impairments which indicate the need for skilled intervention. Tolerating session today without incident. Will continue to follow and progress as tolerated.     Plan/Recommendations:   Moderate Intensity Therapy recommended post-acute care. This is recommended as therapy feels the patient would require 3-4 days per week and wouldn't tolerate \"3 hour daily\" rehab intensity. SNF would be the preferred choice. If the patient does not agree to SNF, arrange HH or OP depending on home bound status. If patient is medically complex, consider LTACH.. Pt requires no DME at discharge.     Pt desires Skilled Rehab placement at discharge. Pt cooperative; agreeable to therapeutic recommendations and plan of care.         Basic Mobility 6-click:  Rollin = Total, A lot = 2, A little = 3; 4 = None  Supine>Sit:   1 = Total, A lot = 2, A little = 3; 4 = None   Sit>Stand with arms:  1 = Total, A lot = 2, A little = 3; 4 = None  Bed>Chair:   1 = Total, A lot = 2, A little = 3; 4 = None  Ambulate in room:  1 = Total, A lot = 2, A little = 3; 4 = None  3-5 Steps with railin = Total, A lot = 2, A little = 3; 4 = None  Score: 17    Modified Osmel: N/A = No pre-op stroke/TIA    Post-Tx Position: Up in Chair, " Alarms activated and Call light and personal items within reach  PPE: gloves, surgical mask, eyewear protection

## 2022-05-11 NOTE — PLAN OF CARE
"Odilia Driverngway presents with ADL impairments below baseline abilities which indicate the need for continued skilled intervention while inpatient. Pt finishing bathing task upon OT arrival, agreeable to OOB activity. Requires Min A for bed mobility, but Min-Mod A for transfer secondary to posterior lean.  Pt is Max A for LB ADLs, and oriented x3 this date. Completes grooming task with set up and LB dressing with max a. Tolerating session today without incident. Will continue to follow and progress as tolerated.     Plan/Recommendations:   Pt would benefit from Skilled Rehab placement at discharge from facility. Moderate Intensity Therapy recommended post-acute care. This is recommended as therapy feels the patient would require 3-4 days per week and wouldn't tolerate \"3 hour daily\" rehab intensity. SNF would be the preferred choice. If the patient does not agree to SNF, arrange HH or OP depending on home bound status. If patient is medically complex, consider LTACH.   Pt desires Skilled Rehab placement at discharge. Pt cooperative; agreeable to therapeutic recommendations and plan of care.   "

## 2022-05-11 NOTE — PLAN OF CARE
"Goal Outcome Evaluation:      Moderate Intensity Therapy recommended post-acute care. This is recommended as therapy feels the patient would require 3-4 days per week and wouldn't tolerate \"3 hour daily\" rehab intensity. SNF would be the preferred choice. If the patient does not agree to SNF, arrange HH or OP depending on home bound status. If patient is medically complex, consider LTACH.           "

## 2022-05-11 NOTE — PROGRESS NOTES
[facility] Emergency Room Medicine Services Daily Progress Note    Patient Name: Odilia Zuniga  : 1945  MRN: [id number]  Primary Care Physician:  Zonia Ch DO  Date of admission: 2022      Subjective    Brief interim history: 76-year-old frail old frail-appearing female who with history of hypertension.  Patient presented to [facility] ED on 2022 complaining of generalized weakness/debility.  Apparently, patient lives with her son.  She denies any fall or traumatic injury.  No chest pain, shortness of breath, no dizziness or lightheadedness.  She stated that its difficult to block from activities of daily living.  She is admitted with generalized weakness/deconditioning.     2022: Seen and examined in follow-up.  Resting in bed in no acute distress or discomfort.     5/10/2022: Seen and examined in follow evaluation.  Resting in bed comfortably.  No complaints or events.    2022: Seen and examined in follow-up.  Sitting comfortably in chair having breakfast.  No complaints or events overnight.        Objective      Vitals:   Temp:  [97.6 °F (36.4 °C)-99.7 °F (37.6 °C)] 97.8 °F (36.6 °C)  Heart Rate:  [64-67] 66  Resp:  [16-18] 17  BP: ()/(50-76) 154/76    Physical Exam   General: She is not in acute distress.     Appearance: Normal appearance. She is not ill-appearing.   HENT:      Head: Normocephalic.      Right Ear: Tympanic membrane normal.      Mouth/Throat:      Mouth: Mucous membranes are moist.   Eyes:      Pupils: Pupils are equal, round, and reactive to light.   Cardiovascular:      Rate and Rhythm: Normal rate.      Pulses: Normal pulses.   Pulmonary:      Effort: Pulmonary effort is normal.   Abdominal:      General: Abdomen is flat.   Musculoskeletal:         General: No swelling or tenderness. Normal range of motion.      Cervical back: Normal range of motion and neck supple.   Skin:     General: Skin is warm.   Neurological:      General: No focal  deficit present.      Mental Status: She is alert.                     Result Review    Result Review:  I have personally reviewed the results from the time of this admission to 5/11/2022 11:33 EDT and agree with these findings:  []  Laboratory  []  Microbiology  []  Radiology  []  EKG/Telemetry   []  Cardiology/Vascular   []  Pathology  []  Old records  []  Other:  Most notable findings include:           Assessment & Plan      cholecalciferol, 1,000 Units, Oral, Daily  citalopram, 40 mg, Oral, Daily  dicyclomine, 20 mg, Oral, TID  heparin (porcine), 5,000 Units, Subcutaneous, Q12H  memantine, 5 mg, Oral, Daily  metoprolol tartrate, 25 mg, Oral, Daily  montelukast, 10 mg, Oral, Nightly             Active Hospital Problems:  Active Hospital Problems    Diagnosis    • Severe malnutrition (HCC)    • Fall, initial encounter      Plan:   Generalized weakness/debility     - multifactorial, PT/OT as tolerated     -CM on board 1 currently awaiting placement for rehab or SNF        Hypertension      -on Lopressor/Maxide      History of osteoporosis        -check vitamin D level and continue current replacement therapy     Depression, recurrent     -on Celexa        DVT prophylaxis:  Medical DVT prophylaxis orders are present.    DVT prophylaxis:  Medical DVT prophylaxis orders are present.    CODE STATUS:    Code Status (Patient has no pulse and is not breathing): CPR (Attempt to Resuscitate)  Medical Interventions (Patient has pulse or is breathing): Full Support      Disposition:  I expect patient to be discharged 24 to 48 hours.        Electronically signed by Carlos Enrique Cole MD, 05/11/22, 11:33 EDT.  [facility] Hospitalist Team

## 2022-05-11 NOTE — PLAN OF CARE
Goal Outcome Evaluation:  Plan of Care Reviewed With: patient        Progress: no change.  Pt. Has rested quietly this shift.  Did receive x1 dose of tylenol for headache, effective, otherwise no complaints.  Pt. Refused boost supplement this shift.  Fall precautions are in place.  VS are within normal limits.  Continue to monitor.

## 2022-05-11 NOTE — DISCHARGE PLACEMENT REQUEST
"Odilia Whitehead (76 y.o. Female)             Date of Birth   1945    Social Security Number       Address   [address] [place] [address]    Home Phone   [phone]    MRN   [id number]       Tenriism   None    Marital Status                               Admission Date   5/6/22    Admission Type   Emergency    Admitting Provider   Jacques Decker MD    Attending Provider   Carlos Enrique Cole MD    Department, Room/Bed   [facility] 3C MEDICAL INPATIENT, 373/1       Discharge Date       Discharge Disposition       Discharge Destination                               Attending Provider: Carlos Enrique Cole MD    Allergies: Penicillins    Isolation: None   Infection: None   Code Status: CPR   Advance Care Planning Activity    Ht: 162.6 cm (64\")   Wt: 39.4 kg (86 lb 12.8 oz)    Admission Cmt: None   Principal Problem: None                Active Insurance as of 5/6/2022     Primary Coverage     Payor Plan Insurance Group Employer/Plan Group    MEDICARE MEDICARE A & B      Payor Plan Address Payor Plan Phone Number Payor Plan Fax Number Effective Dates    [address] [phone]  10/1/2010 - None Entered    [facility] [id number]       Subscriber Name Subscriber Birth Date Member ID       ODILIA WHITEHEAD 1945 [id number]           Secondary Coverage     Payor Plan Insurance Group Employer/Plan Group    AARP  SUP A[employer] HEALTH CARE OPTIONS      Payor Plan Address Payor Plan Phone Number Payor Plan Fax Number Effective Dates    [facility] [phone]  1/1/2019 - None Entered    [address]       [facility] [address]       Subscriber Name Subscriber Birth Date Member ID       ODILIA WHITEHEAD 1945 [id number]                 Emergency Contacts      (Rel.) Home Phone Work Phone Mobile Phone    ALLAN GUTIÉRREZ (Son) -- -- [phone]    BELA GUTIÉRREZ (Other) [phone] -- [phone]        "

## 2022-05-12 NOTE — CASE MANAGEMENT/SOCIAL WORK
Case Management Discharge Note           Selected Continued Care - Discharged on 5/11/2022 Admission date: 5/6/2022 - Discharge disposition: Skilled Nursing Facility (DC - External)    Destination Coordination complete.    Service Provider Selected Services Address Phone Fax Patient Preferred    [facility] [address] [facility] [place] [address] [phone] --           Transportation Services  Private: Car    Final Discharge Disposition Code: 03 - skilled nursing facility (SNF)

## 2022-06-30 ENCOUNTER — APPOINTMENT (OUTPATIENT)
Dept: GENERAL RADIOLOGY | Facility: HOSPITAL | Age: 77
End: 2022-06-30

## 2022-06-30 ENCOUNTER — APPOINTMENT (OUTPATIENT)
Dept: CT IMAGING | Facility: HOSPITAL | Age: 77
End: 2022-06-30

## 2022-06-30 ENCOUNTER — HOSPITAL ENCOUNTER (INPATIENT)
Facility: HOSPITAL | Age: 77
LOS: 1 days | Discharge: NURSING, REHAB OR LONG-TERM CARE | End: 2022-07-05
Attending: EMERGENCY MEDICINE | Admitting: HOSPITALIST
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
## 2022-06-30 DIAGNOSIS — S00.83XA CONTUSION OF FOREHEAD, INITIAL ENCOUNTER: ICD-10-CM

## 2022-06-30 DIAGNOSIS — R55 SYNCOPE, UNSPECIFIED SYNCOPE TYPE: ICD-10-CM

## 2022-06-30 DIAGNOSIS — S70.01XA CONTUSION OF RIGHT HIP, INITIAL ENCOUNTER: ICD-10-CM

## 2022-06-30 LAB
ALBUMIN SERPL-MCNC: 4 G/DL (ref 3.5–5.2)
ALBUMIN/GLOB SERPL: 1.9 G/DL
ALP SERPL-CCNC: 58 U/L (ref 39–117)
ALT SERPL W P-5'-P-CCNC: 7 U/L (ref 1–33)
ANION GAP SERPL CALCULATED.3IONS-SCNC: 9 MMOL/L (ref 5–15)
APTT PPP: 26.5 SECONDS (ref 24–31)
AST SERPL-CCNC: 15 U/L (ref 1–32)
BASOPHILS # BLD AUTO: 0 10*3/MM3 (ref 0–0.2)
BASOPHILS NFR BLD AUTO: 0.6 % (ref 0–1.5)
BILIRUB SERPL-MCNC: 0.2 MG/DL (ref 0–1.2)
BUN SERPL-MCNC: 24 MG/DL (ref 8–23)
BUN/CREAT SERPL: 34.8 (ref 7–25)
CALCIUM SPEC-SCNC: 8.8 MG/DL (ref 8.6–10.5)
CHLORIDE SERPL-SCNC: 105 MMOL/L (ref 98–107)
CO2 SERPL-SCNC: 27 MMOL/L (ref 22–29)
CREAT SERPL-MCNC: 0.69 MG/DL (ref 0.57–1)
DEPRECATED RDW RBC AUTO: 43.3 FL (ref 37–54)
EGFRCR SERPLBLD CKD-EPI 2021: 90.1 ML/MIN/1.73
EOSINOPHIL # BLD AUTO: 0 10*3/MM3 (ref 0–0.4)
EOSINOPHIL NFR BLD AUTO: 0.8 % (ref 0.3–6.2)
ERYTHROCYTE [DISTWIDTH] IN BLOOD BY AUTOMATED COUNT: 15 % (ref 12.3–15.4)
GLOBULIN UR ELPH-MCNC: 2.1 GM/DL
GLUCOSE SERPL-MCNC: 100 MG/DL (ref 65–99)
HCT VFR BLD AUTO: 32.6 % (ref 34–46.6)
HGB BLD-MCNC: 11.2 G/DL (ref 12–15.9)
INR PPP: 1.08 (ref 0.93–1.1)
LYMPHOCYTES # BLD AUTO: 1.4 10*3/MM3 (ref 0.7–3.1)
LYMPHOCYTES NFR BLD AUTO: 23 % (ref 19.6–45.3)
MCH RBC QN AUTO: 27.8 PG (ref 26.6–33)
MCHC RBC AUTO-ENTMCNC: 34.3 G/DL (ref 31.5–35.7)
MCV RBC AUTO: 81 FL (ref 79–97)
MONOCYTES # BLD AUTO: 0.5 10*3/MM3 (ref 0.1–0.9)
MONOCYTES NFR BLD AUTO: 8.1 % (ref 5–12)
NEUTROPHILS NFR BLD AUTO: 4.1 10*3/MM3 (ref 1.7–7)
NEUTROPHILS NFR BLD AUTO: 67.5 % (ref 42.7–76)
NRBC BLD AUTO-RTO: 0 /100 WBC (ref 0–0.2)
PLATELET # BLD AUTO: 254 10*3/MM3 (ref 140–450)
PMV BLD AUTO: 7.2 FL (ref 6–12)
POTASSIUM SERPL-SCNC: 4 MMOL/L (ref 3.5–5.2)
PROT SERPL-MCNC: 6.1 G/DL (ref 6–8.5)
PROTHROMBIN TIME: 11.1 SECONDS (ref 9.6–11.7)
RBC # BLD AUTO: 4.02 10*6/MM3 (ref 3.77–5.28)
SARS-COV-2 RNA PNL SPEC NAA+PROBE: NOT DETECTED
SODIUM SERPL-SCNC: 141 MMOL/L (ref 136–145)
TROPONIN T SERPL-MCNC: <0.01 NG/ML (ref 0–0.03)
WBC NRBC COR # BLD: 6.1 10*3/MM3 (ref 3.4–10.8)

## 2022-06-30 PROCEDURE — 85610 PROTHROMBIN TIME: CPT | Performed by: NURSE PRACTITIONER

## 2022-06-30 PROCEDURE — 87635 SARS-COV-2 COVID-19 AMP PRB: CPT | Performed by: EMERGENCY MEDICINE

## 2022-06-30 PROCEDURE — 70450 CT HEAD/BRAIN W/O DYE: CPT

## 2022-06-30 PROCEDURE — G0378 HOSPITAL OBSERVATION PER HR: HCPCS

## 2022-06-30 PROCEDURE — 93005 ELECTROCARDIOGRAM TRACING: CPT | Performed by: NURSE PRACTITIONER

## 2022-06-30 PROCEDURE — 85025 COMPLETE CBC W/AUTO DIFF WBC: CPT | Performed by: NURSE PRACTITIONER

## 2022-06-30 PROCEDURE — 99222 1ST HOSP IP/OBS MODERATE 55: CPT | Performed by: NURSE PRACTITIONER

## 2022-06-30 PROCEDURE — 99285 EMERGENCY DEPT VISIT HI MDM: CPT

## 2022-06-30 PROCEDURE — 80053 COMPREHEN METABOLIC PANEL: CPT | Performed by: NURSE PRACTITIONER

## 2022-06-30 PROCEDURE — 72125 CT NECK SPINE W/O DYE: CPT

## 2022-06-30 PROCEDURE — 73502 X-RAY EXAM HIP UNI 2-3 VIEWS: CPT

## 2022-06-30 PROCEDURE — 85730 THROMBOPLASTIN TIME PARTIAL: CPT | Performed by: NURSE PRACTITIONER

## 2022-06-30 PROCEDURE — 84484 ASSAY OF TROPONIN QUANT: CPT | Performed by: NURSE PRACTITIONER

## 2022-06-30 RX ORDER — ONDANSETRON 2 MG/ML
4 INJECTION INTRAMUSCULAR; INTRAVENOUS EVERY 6 HOURS PRN
Status: DISCONTINUED | OUTPATIENT
Start: 2022-06-30 | End: 2022-07-05 | Stop reason: HOSPADM

## 2022-06-30 RX ORDER — ACETAMINOPHEN 160 MG/5ML
650 SOLUTION ORAL EVERY 4 HOURS PRN
Status: DISCONTINUED | OUTPATIENT
Start: 2022-06-30 | End: 2022-07-05 | Stop reason: HOSPADM

## 2022-06-30 RX ORDER — ACETAMINOPHEN 650 MG/1
650 SUPPOSITORY RECTAL EVERY 4 HOURS PRN
Status: DISCONTINUED | OUTPATIENT
Start: 2022-06-30 | End: 2022-07-05 | Stop reason: HOSPADM

## 2022-06-30 RX ORDER — ACETAMINOPHEN 325 MG/1
650 TABLET ORAL EVERY 4 HOURS PRN
Status: DISCONTINUED | OUTPATIENT
Start: 2022-06-30 | End: 2022-07-05 | Stop reason: HOSPADM

## 2022-06-30 RX ORDER — SODIUM CHLORIDE 0.9 % (FLUSH) 0.9 %
10 SYRINGE (ML) INJECTION EVERY 12 HOURS SCHEDULED
Status: DISCONTINUED | OUTPATIENT
Start: 2022-06-30 | End: 2022-07-05 | Stop reason: HOSPADM

## 2022-06-30 RX ORDER — ENOXAPARIN SODIUM 100 MG/ML
40 INJECTION SUBCUTANEOUS EVERY 24 HOURS
Status: DISCONTINUED | OUTPATIENT
Start: 2022-07-01 | End: 2022-07-01

## 2022-06-30 RX ORDER — ONDANSETRON 4 MG/1
4 TABLET, FILM COATED ORAL EVERY 6 HOURS PRN
Status: DISCONTINUED | OUTPATIENT
Start: 2022-06-30 | End: 2022-07-05 | Stop reason: HOSPADM

## 2022-06-30 RX ORDER — SODIUM CHLORIDE 9 MG/ML
100 INJECTION, SOLUTION INTRAVENOUS CONTINUOUS
Status: DISCONTINUED | OUTPATIENT
Start: 2022-06-30 | End: 2022-07-02

## 2022-06-30 RX ORDER — SODIUM CHLORIDE 0.9 % (FLUSH) 0.9 %
10 SYRINGE (ML) INJECTION AS NEEDED
Status: DISCONTINUED | OUTPATIENT
Start: 2022-06-30 | End: 2022-07-05 | Stop reason: HOSPADM

## 2022-07-01 LAB
ANION GAP SERPL CALCULATED.3IONS-SCNC: 11 MMOL/L (ref 5–15)
BASOPHILS # BLD AUTO: 0.1 10*3/MM3 (ref 0–0.2)
BASOPHILS NFR BLD AUTO: 0.7 % (ref 0–1.5)
BUN SERPL-MCNC: 21 MG/DL (ref 8–23)
BUN/CREAT SERPL: 29.6 (ref 7–25)
CALCIUM SPEC-SCNC: 8.6 MG/DL (ref 8.6–10.5)
CHLORIDE SERPL-SCNC: 104 MMOL/L (ref 98–107)
CO2 SERPL-SCNC: 25 MMOL/L (ref 22–29)
CREAT SERPL-MCNC: 0.71 MG/DL (ref 0.57–1)
DEPRECATED RDW RBC AUTO: 42 FL (ref 37–54)
EGFRCR SERPLBLD CKD-EPI 2021: 88.2 ML/MIN/1.73
EOSINOPHIL # BLD AUTO: 0.1 10*3/MM3 (ref 0–0.4)
EOSINOPHIL NFR BLD AUTO: 1 % (ref 0.3–6.2)
ERYTHROCYTE [DISTWIDTH] IN BLOOD BY AUTOMATED COUNT: 14.8 % (ref 12.3–15.4)
GLUCOSE SERPL-MCNC: 85 MG/DL (ref 65–99)
HCT VFR BLD AUTO: 31.8 % (ref 34–46.6)
HGB BLD-MCNC: 10.6 G/DL (ref 12–15.9)
LYMPHOCYTES # BLD AUTO: 2.4 10*3/MM3 (ref 0.7–3.1)
LYMPHOCYTES NFR BLD AUTO: 35.2 % (ref 19.6–45.3)
MCH RBC QN AUTO: 27.2 PG (ref 26.6–33)
MCHC RBC AUTO-ENTMCNC: 33.5 G/DL (ref 31.5–35.7)
MCV RBC AUTO: 81.2 FL (ref 79–97)
MONOCYTES # BLD AUTO: 0.7 10*3/MM3 (ref 0.1–0.9)
MONOCYTES NFR BLD AUTO: 9.7 % (ref 5–12)
NEUTROPHILS NFR BLD AUTO: 3.6 10*3/MM3 (ref 1.7–7)
NEUTROPHILS NFR BLD AUTO: 53.4 % (ref 42.7–76)
NRBC BLD AUTO-RTO: 0.2 /100 WBC (ref 0–0.2)
PLATELET # BLD AUTO: 243 10*3/MM3 (ref 140–450)
PMV BLD AUTO: 8.1 FL (ref 6–12)
POTASSIUM SERPL-SCNC: 4 MMOL/L (ref 3.5–5.2)
RBC # BLD AUTO: 3.92 10*6/MM3 (ref 3.77–5.28)
SODIUM SERPL-SCNC: 140 MMOL/L (ref 136–145)
TROPONIN T SERPL-MCNC: <0.01 NG/ML (ref 0–0.03)
TSH SERPL DL<=0.05 MIU/L-ACNC: 1.17 UIU/ML (ref 0.27–4.2)
WBC NRBC COR # BLD: 6.8 10*3/MM3 (ref 3.4–10.8)

## 2022-07-01 PROCEDURE — 85025 COMPLETE CBC W/AUTO DIFF WBC: CPT | Performed by: NURSE PRACTITIONER

## 2022-07-01 PROCEDURE — G0378 HOSPITAL OBSERVATION PER HR: HCPCS

## 2022-07-01 PROCEDURE — 99232 SBSQ HOSP IP/OBS MODERATE 35: CPT | Performed by: STUDENT IN AN ORGANIZED HEALTH CARE EDUCATION/TRAINING PROGRAM

## 2022-07-01 PROCEDURE — 84484 ASSAY OF TROPONIN QUANT: CPT | Performed by: STUDENT IN AN ORGANIZED HEALTH CARE EDUCATION/TRAINING PROGRAM

## 2022-07-01 PROCEDURE — 80048 BASIC METABOLIC PNL TOTAL CA: CPT | Performed by: NURSE PRACTITIONER

## 2022-07-01 PROCEDURE — 84443 ASSAY THYROID STIM HORMONE: CPT | Performed by: NURSE PRACTITIONER

## 2022-07-01 PROCEDURE — 97162 PT EVAL MOD COMPLEX 30 MIN: CPT

## 2022-07-01 PROCEDURE — 36415 COLL VENOUS BLD VENIPUNCTURE: CPT | Performed by: NURSE PRACTITIONER

## 2022-07-01 PROCEDURE — 25010000002 HEPARIN (PORCINE) PER 1000 UNITS: Performed by: STUDENT IN AN ORGANIZED HEALTH CARE EDUCATION/TRAINING PROGRAM

## 2022-07-01 RX ORDER — HEPARIN SODIUM 5000 [USP'U]/ML
5000 INJECTION, SOLUTION INTRAVENOUS; SUBCUTANEOUS EVERY 12 HOURS SCHEDULED
Status: DISCONTINUED | OUTPATIENT
Start: 2022-07-01 | End: 2022-07-01

## 2022-07-01 RX ORDER — FLUDROCORTISONE ACETATE 0.1 MG/1
100 TABLET ORAL DAILY
Status: DISCONTINUED | OUTPATIENT
Start: 2022-07-01 | End: 2022-07-03

## 2022-07-01 RX ORDER — HEPARIN SODIUM 5000 [USP'U]/ML
5000 INJECTION, SOLUTION INTRAVENOUS; SUBCUTANEOUS EVERY 12 HOURS SCHEDULED
Status: DISCONTINUED | OUTPATIENT
Start: 2022-07-01 | End: 2022-07-02

## 2022-07-01 RX ADMIN — SODIUM CHLORIDE 100 ML/HR: 9 INJECTION, SOLUTION INTRAVENOUS at 01:40

## 2022-07-01 RX ADMIN — HEPARIN SODIUM 5000 UNITS: 5000 INJECTION INTRAVENOUS; SUBCUTANEOUS at 21:07

## 2022-07-01 RX ADMIN — Medication 10 ML: at 01:41

## 2022-07-01 RX ADMIN — SODIUM CHLORIDE 100 ML/HR: 9 INJECTION, SOLUTION INTRAVENOUS at 11:03

## 2022-07-01 RX ADMIN — FLUDROCORTISONE ACETATE 100 MCG: 0.1 TABLET ORAL at 12:47

## 2022-07-01 RX ADMIN — Medication 10 ML: at 11:03

## 2022-07-01 RX ADMIN — Medication 10 ML: at 21:08

## 2022-07-01 NOTE — CASE MANAGEMENT/SOCIAL WORK
Continued Stay Note   Daniel     Patient Name: Odilia Zuniga  MRN: [id number]  Today's Date: 7/1/2022    Admit Date: 6/30/2022     Discharge Plan     Row Name 07/01/22 1516       Plan    Plan DC Plan: [facility] accepted 7/1, [facility] following ( no bed available until at least Tuesday 7/5) No precert needed. PASRR per facility    Plan Comments [facility] reported that they would have to decline due to no bed availability. [facility] reported that they will not have a bed until Tuesday 7/5 but they are happy to follow the patient . [facility] Sung reported that they are able to accept the patient. GALEN called family to update.    Row Name 07/01/22 1346       Plan    Plan DC Plan: Referrals made to [facility] ( [facility]st choice) , [facility] and [facility] 7/1. No precert needed. PASRR per facility. Will need to be sitter free 24 hours prior to discharge. [facility] with [facility]              Expected Discharge Date and Time     Expected Discharge Date Expected Discharge Time    Jul 3, 2022         Phone communication or documentation only- no physical contact with patient or family.    Lori Frye RN     Office Phone: [phone]  Office Cell: [phone]

## 2022-07-01 NOTE — THERAPY EVALUATION
Patient Name: Odilia Zuniga  : 1945    MRN: [id number]                              Today's Date: 2022       Admit Date: 2022    Visit Dx:     ICD-10-CM ICD-9-CM   1. Fall, initial encounter  W19.XXXA E888.9   2. Syncope, unspecified syncope type  R55 780.2   3. Contusion of forehead, initial encounter  S00.83XA 920   4. Contusion of right hip, initial encounter  S70.01XA 924.01     Patient Active Problem List   Diagnosis   • Fall, initial encounter   • Severe malnutrition (HCC)     Past Medical History:   Diagnosis Date   • Dementia (HCC)    • Hypertension      History reviewed. No pertinent surgical history.   General Information     Row Name 22 1535          Physical Therapy Time and Intention    Document Type evaluation  -CM (r) AE (t) CM (c)     Mode of Treatment physical therapy  -CM (r) AE (t) CM (c)     Row Name 22 1535          General Information    Patient Profile Reviewed yes  -CM (r) AE (t) CM (c)     Prior Level of Function dependent:;ADL's;feeding;grooming;dressing;bathing;cleaning;cooking  Uses RW and SPC for home mobility  -CM (r) AE (t) CM (c)     Existing Precautions/Restrictions fall;orthostatic hypotension  -CM (r) AE (t) CM (c)     Barriers to Rehab medically complex;cognitive status;environmental barriers;previous functional deficit  Dementia  -CM (r) AE (t) CM (c)     Row Name 22 1535          Living Environment    People in Home child(emi), adult  son  -CM (r) AE (t) CM (c)     Name(s) of People in Home Son and DIL  -CM (r) AE (t) CM (c)     Row Name 22 1532          Safety Issues, Functional Mobility    Safety Issues Affecting Function (Mobility) friction/shear risk;insight into deficits/self-awareness;problem-solving;sequencing abilities  -CM (r) AE (t) CM (c)     Impairments Affecting Function (Mobility) balance;cognition;coordination;endurance/activity tolerance;muscle tone abnormal;visual/perceptual;range of motion (ROM);pain;strength  -CM  (r) AE (t) CM (c)     Cognitive Impairments, Mobility Safety/Performance attention;awareness, need for assistance;judgment;problem-solving/reasoning;safety precaution awareness;safety precaution follow-through;sequencing abilities  -CM (r) AE (t) CM (c)           User Key  (r) = Recorded By, (t) = Taken By, (c) = Cosigned By    Initials Name Provider Type     Analy Garay, PT Physical Therapist    Rosa Negro, PT Student PT Student               Mobility     Row Name 07/01/22 1537          Transfers    Comment, (Transfers) Min-mod-A x 1  -CM (r) AE (t) CM (c)     Row Name 07/01/22 1537          Sit-Stand Transfer    Sit-Stand [place] (Transfers) minimum assist (75% patient effort);moderate assist (50% patient effort);1 person assist  -CM (r) AE (t) CM (c)     Assistive Device (Sit-Stand Transfers) walker, front-wheeled  -CM (r) AE (t) CM (c)     Comment, (Sit-Stand Transfer) Pt requires increased time to scoot to edge of chair and verbal cues for hand placement on walker  -CM (r) AE (t) CM (c)     Row Name 07/01/22 1537          Gait/Stairs (Locomotion)    [place] Level (Gait) unable to assess  -CM (r) AE (t) CM (c)           User Key  (r) = Recorded By, (t) = Taken By, (c) = Cosigned By    Initials Name Provider Type     Analy Garay, PT Physical Therapist    Rosa Negro, PT Student PT Student               Obj/Interventions     Row Name 07/01/22 1539          Range of Motion Comprehensive    General Range of Motion bilateral lower extremity ROM WFL;bilateral upper extremity ROM WFL  -CM (r) AE (t) CM (c)     Row Name 07/01/22 1539          Strength Comprehensive (MMT)    Comment, General Manual Muscle Testing (MMT) Assessment Accurate MMT difficult to assess, limited by pain  -CM (r) AE (t) CM (c)     Row Name 07/01/22 1539          Motor Skills    Motor Skills coordination;muscle tone;functional endurance  Muscle atrophy, decreased endurance, impaired coordination  -CM (r) AE  (t) CM (c)     Muscle Tone other (see comments)  decreased  -CM (r) AE (t) CM (c)     Row Name 07/01/22 1539          Balance    Balance Assessment sitting static balance;sitting dynamic balance;sit to stand dynamic balance;standing dynamic balance;standing static balance  -CM (r) AE (t) CM (c)     Static Sitting Balance supervision  -CM (r) AE (t) CM (c)     Dynamic Sitting Balance supervision  -CM (r) AE (t) CM (c)     Position, Sitting Balance sitting in chair  -CM (r) AE (t) CM (c)     Sit to Stand Dynamic Balance minimal assist  -CM (r) AE (t) CM (c)     Static Standing Balance minimal assist  -CM (r) AE (t) CM (c)     Dynamic Standing Balance minimal assist  -CM (r) AE (t) CM (c)     Position/Device Used, Standing Balance walker, rolling  -CM (r) AE (t) CM (c)     Row Name 07/01/22 1534          Sensory Assessment (Somatosensory)    Sensory Assessment (Somatosensory) sensation intact  -CM (r) AE (t) CM (c)           User Key  (r) = Recorded By, (t) = Taken By, (c) = Cosigned By    Initials Name Provider Type    CM Analy Garay, PT Physical Therapist    Rosa Negro, PT Student PT Student               Goals/Plan     Row Name 07/01/22 1544          Bed Mobility Goal 1 (PT)    Activity/Assistive Device (Bed Mobility Goal 1, PT) bed mobility activities, all  -CM (r) AE (t) CM (c)     [place] Level/Cues Needed (Bed Mobility Goal 1, PT) standby assist  -CM (r) AE (t) CM (c)     Time Frame (Bed Mobility Goal 1, PT) 2 weeks  -CM (r) AE (t) CM (c)     Row Name 07/01/22 6939          Transfer Goal 1 (PT)    Activity/Assistive Device (Transfer Goal 1, PT) sit-to-stand/stand-to-sit  -CM (r) AE (t) CM (c)     [place] Level/Cues Needed (Transfer Goal 1, PT) standby assist  -CM (r) AE (t) CM (c)     Time Frame (Transfer Goal 1, PT) 2 weeks  -CM (r) AE (t) CM (c)     Row Name 07/01/22 1545          Gait Training Goal 1 (PT)    Activity/Assistive Device (Gait Training Goal 1, PT) gait (walking locomotion)   -CM (r) AE (t) CM (c)     [place] Level (Gait Training Goal 1, PT) minimum assist (75% or more patient effort)  -CM (r) AE (t) CM (c)     Distance (Gait Training Goal 1, PT) 50  -CM (r) AE (t) CM (c)     Row Name 07/01/22 1543          Therapy Assessment/Plan (PT)    Planned Therapy Interventions (PT) balance training;bed mobility training;gait training;home exercise program;joint mobilization;patient/family education;neuromuscular re-education;ROM (range of motion);strengthening;transfer training;vestibular therapy  -CM (r) AE (t) CM (c)           User Key  (r) = Recorded By, (t) = Taken By, (c) = Cosigned By    Initials Name Provider Type    CM Analy Garay, PT Physical Therapist    Rosa Negro, PT Student PT Student               Clinical Impression     Row Name 07/01/22 1541          Pain    Pre/Posttreatment Pain Comment Pain reported w/ MMT  -CM (r) AE (t) CM (c)     Pain Intervention(s) Therapeutic touch;Emotional support;Repositioned  -CM (r) AE (t) CM (c)     Row Name 07/01/22 1541          Plan of Care Review    Plan of Care Reviewed With patient  -CM (r) AE (t) CM (c)     Progress no change  -CM (r) AE (t) CM (c)     Outcome Evaluation Pt is a 77 y/[facility] a [facility] at home. Pt was found to have  contusions of forehead and R hip. CT revealed L frontal scalp hematoma; CT (-) for skull fx or acute cervical injury. PMH includes dementia, HTN, recurrent fall hx, orthostatic hypotension, and anxiety. At baseline pt lives w/ son and DIL in two-story home w/ stairs to enter and within the home. Pt likely poor historian due to progressive dementia and family not present to provide hx. At [facility] pt is dependent for all ADLs and uses both a rolling walker and SPC for household mobility. At this date, pt is oriented to person only and is mod-A x 1 to come to standing w/ RW. She c/o dizziness w/ VSS. Pt is able to take 2 steps before needing to rest. Pt demonstrates  decreased gross strength, impaired coordination, and significant impairments in balance and activity tolerance. Due to pt functioning below baseline and at an increased falls risk, pt is appropriate for receiving skilled therapy services. Recommending SNF at d/c. PT will follow.  -CM (r) AE (t) CM (c)     Row Name 07/01/22 1541          Therapy Assessment/Plan (PT)    Rehab Potential (PT) good, to achieve stated therapy goals  -CM (r) AE (t) CM (c)     Criteria for Skilled Interventions Met (PT) yes  -CM (r) AE (t) CM (c)     Therapy Frequency (PT) 5 times/wk  -CM (r) AE (t) CM (c)     Row Name 07/01/22 1541          Vital Signs    Pre Systolic BP Rehab 158  -CM (r) AE (t) CM (c)     Pre Treatment Diastolic BP 84  -CM (r) AE (t) CM (c)     Intra Systolic BP Rehab 155  -CM (r) AE (t) CM (c)     Intra Treatment Diastolic BP 61  -CM (r) AE (t) CM (c)     Post Systolic BP Rehab 144  -CM (r) AE (t) CM (c)     Post Treatment Diastolic BP 68  -CM (r) AE (t) CM (c)     Pretreatment Heart Rate (beats/min) 74  -CM (r) AE (t) CM (c)     Intratreatment Heart Rate (beats/min) 75  -CM (r) AE (t) CM (c)     Posttreatment Heart Rate (beats/min) 72  -CM (r) AE (t) CM (c)     O2 Delivery Pre Treatment room air  -CM (r) AE (t) CM (c)     O2 Delivery Intra Treatment room air  -CM (r) AE (t) CM (c)     O2 Delivery Post Treatment room air  -CM (r) AE (t) CM (c)     Row Name 07/01/22 1541          Positioning and Restraints    Pre-Treatment Position sitting in chair/recliner  sitter present  -CM (r) AE (t) CM (c)     Post Treatment Position chair  -CM (r) AE (t) CM (c)     In Chair notified nsg;reclined;sitting;call light within reach;encouraged to call for assist;exit alarm on;with other staff  with sitter  -CM (r) AE (t) CM (c)           User Key  (r) = Recorded By, (t) = Taken By, (c) = Cosigned By    Initials Name Provider Type    Analy Hogue, PT Physical Therapist    Rosa Negro, PT Student PT Student                Outcome Measures     Row Name 07/01/22 1544          How much help from another person do you currently need...    Turning from your back to your side while in flat bed without using bedrails? 2  -CM (r) AE (t) CM (c)     Moving from lying on back to sitting on the side of a flat bed without bedrails? 2  -CM (r) AE (t) CM (c)     Moving to and from a bed to a chair (including a wheelchair)? 2  -CM (r) AE (t) CM (c)     Standing up from a chair using your arms (e.g., wheelchair, bedside chair)? 2  -CM (r) AE (t) CM (c)     Climbing 3-5 steps with a railing? 1  -CM (r) AE (t) CM (c)     To walk in hospital room? 1  -CM (r) AE (t) CM (c)     AM-PAC 6 Clicks Score (PT) 10  -CM (r) AE (t)     Highest level of mobility 4 --> Transferred to chair/commode  -CM (r) AE (t)     Row Name 07/01/22 1544          Modified [place] Scale    Pre-Stroke Modified [place] Scale 6 - Unable to determine (UTD) from the medical record documentation  -CM (r) AE (t) CM (c)     Modified [place] Scale 4 - Moderately severe disability.  Unable to walk without assistance, and unable to attend to own bodily needs without assistance.  -CM (r) AE (t) CM (c)     Row Name 07/01/22 1544          Functional Assessment    Outcome Measure Options AM-PAC 6 Clicks Basic Mobility (PT);Modified [place]  -CM (r) AE (t) CM (c)           User Key  (r) = Recorded By, (t) = Taken By, (c) = Cosigned By    Initials Name Provider Type    Analy Hogue, PT Physical Therapist    Rosa Negro PT Student PT Student                             Physical Therapy Education                 Title: PT OT SLP Therapies (Done)     Topic: Physical Therapy (Done)     Point: Mobility training (Done)     Learning Progress Summary           Patient Acceptance, E, VU by AE at 7/1/2022 1545                   Point: Home exercise program (Done)     Learning Progress Summary           Patient Acceptance, E, VU by AE at 7/1/2022 1545                   Point: Body mechanics (Done)      Learning Progress Summary           Patient Acceptance, E, VU by AE at 7/1/2022 1545                   Point: Precautions (Done)     Learning Progress Summary           Patient Acceptance, E, VU by AE at 7/1/2022 1545                               User Key     Initials Effective Dates Name Provider Type Discipline    AE 05/12/22 -  Rosa Gonzalez, PT Student PT Student PT              PT Recommendation and Plan  Planned Therapy Interventions (PT): balance training, bed mobility training, gait training, home exercise program, joint mobilization, patient/family education, neuromuscular re-education, ROM (range of motion), strengthening, transfer training, vestibular therapy  Plan of Care Reviewed With: patient  Progress: no change  Outcome Evaluation: Pt is a 75 y/o female who presented to [facility] on 6/30/22 after a fall at home. Pt was found to have  contusions of forehead and R hip. CT revealed L frontal scalp hematoma; CT (-) for skull fx or acute cervical injury. PMH includes dementia, HTN, recurrent fall hx, orthostatic hypotension, and anxiety. At baseline pt lives w/ son and DIL in two-story home w/ stairs to enter and within the home. Pt likely poor historian due to progressive dementia and family not present to provide hx. At [facility] pt is dependent for all ADLs and uses both a rolling walker and SPC for household mobility. At this date, pt is oriented to person only and is mod-A x 1 to come to standing w/ RW. She c/o dizziness w/ VSS. Pt is able to take 2 steps before needing to rest. Pt demonstrates decreased gross strength, impaired coordination, and significant impairments in balance and activity tolerance. Due to pt functioning below baseline and at an increased falls risk, pt is appropriate for receiving skilled therapy services. Recommending SNF at d/c. PT will follow.     Time Calculation:    PT Charges     Row Name 07/01/22 1546             Time Calculation    Start Time 1400  -CM (r) AE (t) CM (c)       Stop Time 1432  -CM (r) AE (t) CM (c)      Time Calculation (min) 32 min  -CM (r) AE (t)      PT Received On 07/01/22  -CM (r) AE (t) CM (c)      PT - Next Appointment 07/03/22  -CM (r) AE (t) CM (c)      PT Goal Re-Cert Due Date 07/15/22  -CM (r) AE (t) CM (c)            User Key  (r) = Recorded By, (t) = Taken By, (c) = Cosigned By    Initials Name Provider Type    Analy Hogue, PT Physical Therapist    Rosa Negro, PT Student PT Student              Therapy Charges for Today     Code Description Service Date Service Provider Modifiers Qty    31159766085 HC PT EVAL MOD COMPLEXITY 4 7/1/2022 Rosa Gonzalez, PT Student GP 1          PT G-Codes  Outcome Measure Options: AM-PAC 6 Clicks Basic Mobility (PT), Modified [place]  AM-PAC 6 Clicks Score (PT): 10  Modified [place] Scale: 4 - Moderately severe disability.  Unable to walk without assistance, and unable to attend to own bodily needs without assistance.    Rosa Gonzalez PT Student  7/1/2022

## 2022-07-01 NOTE — DISCHARGE PLACEMENT REQUEST
"Odilia Whitehead (76 y.o. Female)             Date of Birth   1945    Social Security Number       Address   [address] [place] [address] [place] [facility]    Home Phone   [phone]    MRN   [id number]       Church   None    Marital Status                               Admission Date   6/30/22    Admission Type   Emergency    Admitting Provider   Steven Ch DO    Attending Provider   Steven Ch DO    Department, Room/Bed   [address] PEDIATRICS, 201/1       Discharge Date       Discharge Disposition       Discharge Destination                               Attending Provider: Steven Ch DO    Allergies: Penicillins    Isolation: None   Infection: None   Code Status: CPR   Advance Care Planning Activity    Ht: 167.6 cm (66\")   Wt: 45.4 kg (100 lb)    Admission Cmt: None   Principal Problem: None                Active Insurance as of 6/30/2022     Primary Coverage     Payor Plan Insurance Group Employer/Plan Group    MEDICARE MEDICARE A & B      Payor Plan Address Payor Plan Phone Number Payor Plan Fax Number Effective Dates    [address] [phone]  10/1/2010 - None Entered    [facility] [id number]       Subscriber Name Subscriber Birth Date Member ID       ODILIA WHITEHEAD 1945 [id number]           Secondary Coverage     Payor Plan Insurance Group Employer/Plan Group    [facility] [employer] [facility] C[facility] OPTIONS      Payor Plan Address Payor Plan Phone Number Payor Plan Fax Number Effective Dates    [facility] [phone]  1/1/2019 - None Entered    [address]       [facility] [address]       Subscriber Name Subscriber Birth Date Member ID       ODILIA WHITEHEAD 1945 [id number]                 Emergency Contacts      (Rel.) Home Phone Work Phone Mobile Phone    MARLAALLAN (Son) -- -- [phone]    BELA GUTIÉRREZ (Other) [phone] -- [phone]            "

## 2022-07-01 NOTE — CASE MANAGEMENT/SOCIAL WORK
Continued Stay Note   Daniel     Patient Name: Odilia Zuniga  MRN: [id number]  Today's Date: 7/1/2022    Admit Date: 6/30/2022     Discharge Plan     Row Name 07/01/22 1346       Plan    Plan DC Plan: Referrals made to [facility] ( [facility] choice) , [facility] and [facility] 7/1. No precert needed. PASRR per facility. Will need to be sitter free 24 hours prior to discharge. [facility]                Expected Discharge Date and Time     Expected Discharge Date Expected Discharge Time    Jul 3, 2022         Phone communication or documentation only- no physical contact with patient or family.    Lori Frye RN     Office Phone: [phone]  Office Cell: [phone]

## 2022-07-01 NOTE — PLAN OF CARE
Goal Outcome Evaluation:  Plan of Care Reviewed With: patient        Progress: no change  Outcome Evaluation: Pt is a 77 y/o female who presented to [facility] on 6/30/22 after a fall at home. Pt was found to have  contusions of forehead and R hip. CT revealed L frontal scalp hematoma; CT (-) for skull fx or acute cervical injury. PMH includes dementia, HTN, recurrent fall hx, orthostatic hypotension, and anxiety. At baseline pt lives w/ son and DIL in two-story home w/ stairs to enter and within the home. Pt likely poor historian due to progressive dementia and family not present to provide hx. At [facility] pt is dependent for all ADLs and uses both a rolling walker and SPC for household mobility. At this date, pt is oriented to person only and is mod-A x 1 to come to standing w/ RW. She c/o dizziness w/ VSS. Pt is able to take 2 steps before needing to rest. Pt demonstrates decreased gross strength, impaired coordination, and significant impairments in balance and activity tolerance. Due to pt functioning below baseline and at an increased falls risk, pt is appropriate for receiving skilled therapy services. Recommending SNF at d/c. PT will follow.

## 2022-07-01 NOTE — CASE MANAGEMENT/SOCIAL WORK
Discharge Planning Assessment   Daniel     Patient Name: Odilia Zuniga  MRN: [id number]  Today's Date: 7/1/2022    Admit Date: 6/30/2022     Discharge Needs Assessment     Row Name 07/01/22 1059       Living Environment    People in Home child(emi), adult    Name(s) of People in Home SonShakeel Tijerina and daughter in law Karla    Current Living Arrangements home    Primary Care Provided by child(emi)    Provides Primary Care For no one, unable/limited ability to care for self    Family Caregiver if Needed child(emi), adult    Family Caregiver Names SonLuli, Daughter in Law-Karla    Quality of Family Relationships supportive;involved;helpful    Able to Return to Prior Arrangements yes       Resource/Environmental Concerns    Resource/Environmental Concerns none    Transportation Concerns none       Transition Planning    Patient/Family Anticipates Transition to other (see comments)  sub acute rehab/ SNF    Patient/Family Anticipated Services at Transition rehabilitation services    Transportation Anticipated family or friend will provide       Discharge Needs Assessment    Readmission Within the Last 30 Days no previous admission in last 30 days    Equipment Currently Used at Home walker, standard    Concerns to be Addressed discharge planning    Anticipated Changes Related to Illness none    Equipment Needed After Discharge none    Provided Post Acute Provider List? Yes    Post Acute Provider List Inpatient Rehab    Delivered To Support Person    Support Person kirit Tijerina    Method of Delivery Telephone    Patient's Choice of Community Agency(s) [facility] is their first choice, Also want referrals to [facility] and [facility]               Discharge Plan     Row Name 07/01/22 1109       Plan    Plan DC Plan: Referrals made to [facility] ( 1st choice) , [facility] and [facility] 7/1. No precert needed. PASRR per facility. Will need to be sitter free 24 hours prior to discharge.     Patient/Family in  Agreement with Plan yes    Plan Comments Due to the patients confusion CM called the patient's son. PCP and Pharmacy verified.  He reports that the patient was living with her sister for a while but that her sister's health has declined and so the patient now lives with him and his wife. He reports that she has a standard walker at home but that she will walk off without the walker and has to be reminded to use it. He reports that she has HH but he is unsure with what agency but reports that his wife would know that information. He reports that he thinks they were just out to see the patient yesterday 6/30. He reports that the patient needs assistance with ADL's and that they have to help her in and out of the tub. He reports that she has done rehab before at [facility] and that it seemed to help her alot. He was agreeable to some rehab for the patient before returning home. He was provided a list of options verbally over the phone and reported wanting to get her back to [facility] if possible. He was agreeable to referrals being made to [facility] and [facility] as well in case [facility] would not have a bed available. Referrals made in EPIC and messages sent to the facility liaisons.  [facility] reported the patient is current with [facility] services. [facility]  Added in Epic.               Continued Care and Services - Admitted Since 6/30/2022     Destination     Service Provider Request Status Selected Services Address Phone Fax Patient Preferred    [facility]  Pending - Request Sent N/A [address] [facility] [place] [address] [phone]     [facility]  Pending - Request Sent N/A [address] [place] [address] [phone] --    [facility]  Pending - Request Sent N/A [address] [place] [address] [phone] --           Expected Discharge Date and Time     Expected Discharge Date  Expected Discharge Time    Jul 3, 2022          Demographic Summary     Row Name 07/01/22 1058       General Information    Admission Type observation    Arrived From emergency department    Referral Source admission list    Reason for Consult discharge planning    Preferred Language English       Contact Information    Permission Granted to Share Info With                Functional Status     Row Name 07/01/22 1058       Functional Status    Usual Activity Tolerance moderate    Current Activity Tolerance fair       Functional Status, IADL    Medications assistive person    Meal Preparation completely dependent    Housekeeping completely dependent    Laundry completely dependent    Shopping completely dependent       Mental Status    General Appearance WDL WDL       Mental Status Summary    Recent Changes in Mental Status/Cognitive Functioning no changes            Phone communication or documentation only- no physical contact with patient or family.    Lori Frye RN     Office Phone: [phone]  Office Cell: [phone]

## 2022-07-01 NOTE — ED PROVIDER NOTES
"Subjective   Patient is a 76-year-old white female with history of hypertension and dementia who presents today from home by EMS with reports of a fall.  Patient at baseline oriented to person.  On exam she was oriented to person only.  She tells me that she had some pressure in her chest and that \"she needed to see the doctor for it.\"  She states she does not remember falling or really why she is here today.  Son at the bedside states that she was sitting at the kitchen table when he left the room for approximately 30 seconds and then heard a thud and when he went to check on her she was in the floor.  He states \"she was out of it\" for few seconds.  Patient complains of a headache and some pain in the right hip.  She currently denies any dizziness visual changes unilateral weakness or deficit.  She denies any chest pain or pressure currently.  She denies shortness of breath nausea vomiting diarrhea recent illness fever chills cough congestion or other complaint.  Son at the bedside states that she has not been ill recently.          Review of Systems   Unable to perform ROS: Dementia   HENT:        Head injury       Past Medical History:   Diagnosis Date   • Hypertension        Allergies   Allergen Reactions   • Penicillins Hives       No past surgical history on file.    No family history on file.    Social History     Socioeconomic History   • Marital status:    Tobacco Use   • Smoking status: Never Smoker   Vaping Use   • Vaping Use: Never used   Substance and Sexual Activity   • Alcohol use: Not Currently   • Drug use: Never   • Sexual activity: Defer           Objective   Physical Exam  Vital signs and triage nurse note reviewed.  Constitutional: Awake, alert; well-developed and well-nourished. No acute distress is noted.  HEENT: Normocephalic; pupils are PERRL with intact EOM; oropharynx is pink and moist without exudate or erythema.  No drooling or pooling of oral secretions.  Golf ball sized hematoma " noted to the left frontal scalp.  There is some underlying tenderness, no crepitus or step-off noted.  No sutherland sign noted.  No raccoon eyes.  No hemotympanum.  Neck: Supple, full range of motion without pain.  There is no midline tenderness crepitus or step-off noted.; no cervical lymphadenopathy. Normal phonation.  Cardiovascular: Regular rate and rhythm, normal S1-S2.  No murmur noted.  Pulmonary: Respiratory effort regular nonlabored, breath sounds clear to auscultation all fields.  Abdomen: Soft, nontender, nondistended with normoactive bowel sounds; no rebound or guarding.  Musculoskeletal: Independent range of motion of all extremities however she does appear to have some pain in the right hip with movement.  She also has some mild tenderness to palpation over the right hip.  Good cap refill and sensation distally.  No shortening or rotation.  Neuro: Alert oriented to person only, speech is clear and appropriate, GCS 15.    Skin: Flesh tone, warm, dry, intact; no erythematous or petechial rash or lesion.      Procedures           ED Course      Labs Reviewed   COMPREHENSIVE METABOLIC PANEL - Abnormal; Notable for the following components:       Result Value    Glucose 100 (*)     BUN 24 (*)     BUN/Creatinine Ratio 34.8 (*)     All other components within normal limits    Narrative:     GFR Normal >60  Chronic Kidney Disease <60  Kidney Failure <15     CBC WITH AUTO DIFFERENTIAL - Abnormal; Notable for the following components:    Hemoglobin 11.2 (*)     Hematocrit 32.6 (*)     All other components within normal limits   PROTIME-INR - Normal   APTT - Normal   TROPONIN (IN-HOUSE) - Normal    Narrative:     Troponin T Reference Range:  <= 0.03 ng/mL-   Negative for AMI  >0.03 ng/mL-     Abnormal for myocardial necrosis.  Clinicians would have to utilize clinical acumen, EKG, Troponin and serial changes to determine if it is an Acute Myocardial Infarction or myocardial injury due to an underlying chronic  condition.       Results may be falsely decreased if patient taking Biotin.     CBC AND DIFFERENTIAL    Narrative:     The following orders were created for panel order CBC & Differential.  Procedure                               Abnormality         Status                     ---------                               -----------         ------                     CBC Auto Differential[[id number]]        Abnormal            Final result                 Please view results for these tests on the individual orders.     CT Head Without Contrast    Result Date: 6/30/2022  1. Negative for evidence of intracranial injury or skull fracture. 2. There is a left frontal scalp hematoma. 3. There is stable cerebral atrophy and moderate chronic white matter disease.  Electronically Signed By-Margarita Newman MD On:6/30/2022 9:10 PM This report was finalized on [id number] by  Margarita Newman MD.    CT Cervical Spine Without Contrast    Result Date: 6/30/2022  1. Negative for evidence of injury to the cervical spine. 2. There is degenerative disc disease as reported above.  Electronically Signed By-Margarita Newman MD On:6/30/2022 9:15 PM This report was finalized on [id number] by  Margarita Newman MD.    XR Hip With or Without Pelvis 2 - 3 View Right    Result Date: 6/30/2022  Negative exam. No evidence of right hip fracture.  Electronically Signed By-Margarita Newman MD On:6/30/2022 8:14 PM This report was finalized on [id number] by  Margarita Newman MD.    Medications   sodium chloride 0.9 % flush 10 mL (has no administration in time range)                                          MDM  Number of Diagnoses or Management Options  Contusion of forehead, initial encounter  Contusion of right hip, initial encounter  Fall, initial encounter  Syncope, unspecified syncope type  Diagnosis management comments: Patient presented by EMS on a spine board with c-collar in place.  C-collar was removed on my exam and she was removed from the  spineboard.  She was placed on continuous cardiac monitor.  She had an IV established.  She had labs, EKG CT head and C-spine obtained as well as x-rays of the right hip.    Work-up: CBC and metabolic panel are unremarkable.  Troponin is negative.  EKG reviewed by me and interpreted by Dr. Norwood shows sinus rhythm with ventricular rate of 60, no acute ST or T wave changes, no ectopy.  X-ray of the right hip reviewed by me and interpreted by radiologist shows no acute bony abnormality.  CTs of the head and C-spine reviewed by me and interpreted by radiologist show Negative for evidence of intracranial injury or skull fracture. 2. There is a left frontal scalp hematoma. 3. There is stable cerebral atrophy and moderate chronic white matter disease.  Negative for evidence of injury to the cervical spine. 2. There is degenerative disc disease as reported above.    On reexamination patient is resting comfortably and in no distress.  She remains neurologically stable.  She is hemodynamically stable.  She has no new complaints at this time.     Patient will be admitted to hospital for further observation.  She is discussed with hospitalist nurse practitioner.    Diagnosis and treatment plan discussed with patient/family at bedside.  Patient/family at bedside agreeable to plan.     Patient was also discussed with Dr. Norwood, ER attending.       Amount and/or Complexity of Data Reviewed  Clinical lab tests: ordered and reviewed  Tests in the radiology section of CPT®: ordered and reviewed  Decide to obtain previous medical records or to obtain history from someone other than the patient: yes    Patient Progress  Patient progress: stable      Final diagnoses:   Fall, initial encounter   Syncope, unspecified syncope type   Contusion of forehead, initial encounter   Contusion of right hip, initial encounter       ED Disposition  ED Disposition     ED Disposition   Decision to Admit    Condition   --    Comment   Level of  Care: Telemetry [5]   Admitting Physician: KATHY DAS N [[id number]]   Attending Physician: KATHY DAS N [[id number]]               No follow-up provider specified.       Medication List      No changes were made to your prescriptions during this visit.          Kacey Manrique, TSERING  06/30/22 0731

## 2022-07-01 NOTE — H&P
[facility] Medicine Services      Patient Name: Odilia Zuniga  : 1945  MRN: [id number]  Primary Care Physician:  Zonia Ch DO  Date of admission: 2022      Subjective      Chief Complaint: Fall with head injury.    History of Present Illness: Odilia Zuniga is a 76 y.o. female who presented to [facility] on 2022 complaining of a fall at home.  The patient reported chest pressure to the emergency room provider but denied chest pain or pressure when asked about it.  Patient has a history of dementia and at baseline knows her name but not the date.  According to documentation the patient was sitting at the kitchen table when her family left the room for 30 seconds and heard a loud thump on the floor.  It took the patient a few minutes to return to baseline after the incident.  The patient has a moderate sized bruise on her left anterior forehead without broken skin.    Review of records with summary: Patient 2022 for weakness.  She was seen by physical therapy with bedside treatment with improvement.  She was discharged to physical rehab for continued treatment. Report of dizziness 3/11/2022.   Fall 2021.     Review of Systems   Unable to perform ROS: dementia   All other systems reviewed and are negative.      Personal History     Past Medical History:   Diagnosis Date   • Dementia (HCC)    • Hypertension        History reviewed. No pertinent surgical history.    Family History: family history is not on file. Otherwise pertinent FHx was reviewed and not pertinent to current issue.    Social History:  reports that she has never smoked. She does not have any smokeless tobacco history on file. She reports previous alcohol use. She reports that she does not use drugs.    Home Medications:  Prior to Admission Medications     Prescriptions Last Dose Informant Patient Reported? Taking?    citalopram (CeleXA) 40 MG tablet  Child Yes No    Take  40 mg by mouth Daily.    dicyclomine (BENTYL) 20 MG tablet   Yes No    Take 20 mg by mouth 3 (Three) Times a Day.    memantine (NAMENDA) 5 MG tablet   Yes No    Take 5 mg by mouth Daily.    metoprolol tartrate (LOPRESSOR) 25 MG tablet   No No    Take 1 tablet by mouth Daily for 26 doses.    montelukast (SINGULAIR) 10 MG tablet  Child Yes No    Take 10 mg by mouth Every Night.    Potassium 99 MG tablet   Yes No    Take 99 mg by mouth Daily.            Allergies:  Allergies   Allergen Reactions   • Penicillins Hives       Objective      Vitals:   Temp:  [98.3 °F (36.8 °C)] 98.3 °F (36.8 °C)  Heart Rate:  [60-73] 62  Resp:  [18] 18  BP: (149-207)/(72-92) 149/72    Physical Exam  Vitals and nursing note reviewed.   Constitutional:       General: She is not in acute distress.     Appearance: Normal appearance. She is not ill-appearing, toxic-appearing or diaphoretic.   HENT:      Head: Normocephalic. Contusion present. No raccoon eyes, Guillermo's sign, abrasion or laceration.      Jaw: No tenderness.        Right Ear: External ear normal.      Left Ear: External ear normal.      Nose: Nose normal.      Mouth/Throat:      Mouth: Mucous membranes are dry.   Eyes:      General: No scleral icterus.        Right eye: No discharge.         Left eye: No discharge.      Extraocular Movements: Extraocular movements intact.      Conjunctiva/sclera: Conjunctivae normal.      Pupils: Pupils are equal, round, and reactive to light.   Cardiovascular:      Rate and Rhythm: Normal rate and regular rhythm.      Pulses: Normal pulses.      Heart sounds: Normal heart sounds. No murmur heard.  Pulmonary:      Effort: Pulmonary effort is normal.      Breath sounds: Normal breath sounds.   Chest:      Chest wall: No tenderness.   Abdominal:      General: Bowel sounds are normal.      Palpations: Abdomen is soft.   Musculoskeletal:         General: Normal range of motion.      Cervical back: Normal range of motion and neck supple.      Right  lower leg: No edema.      Left lower leg: No edema.   Skin:     General: Skin is warm and dry.   Neurological:      Mental Status: She is alert. Mental status is at baseline. She is disoriented.   Psychiatric:         Attention and Perception: Attention normal. She is attentive. She does not perceive auditory or visual hallucinations.         Mood and Affect: Affect is blunt.         Speech: Speech is delayed.         Behavior: Behavior is cooperative.         Cognition and Memory: Cognition is impaired. Memory is impaired. She exhibits impaired recent memory and impaired remote memory.         Judgment: Judgment is impulsive.         Result Review    Result Review:  I have personally reviewed the results from the time of this admission to 6/30/2022 22:52 EDT and agree with these findings:  [x]  Laboratory  []  Microbiology  [x]  Radiology  [x]  EKG/Telemetry   []  Cardiology/Vascular   []  Pathology  [x]  Old records  []  Other:  Most notable findings include: Primarily normal findings    Assessment & Plan        Active Hospital Problems:  Active Hospital Problems    Diagnosis    • Fall, initial encounter      Plan:     Fall, uncertain etiology: Check orthostatic vital signs; patient may benefit from Holter monitor as this is the second fall within 3 months: Sitter at bedside; fall precautions; physical therapy consult; IV fluids normal saline  -The patient cannot give an accurate description of fall secondary to dementia  -Patient had hospital admission May 2022 and was discharged with physical therapy    Anemia, mild, hemoglobin 11.2 which is near baseline over the last 8 months    Dementia with depression, chronic: Continue Celexa; continue Namenda    IBS, chronic: Hold Bentyl    Hypertension, chronic: Continue metoprolol    Allergies, chronic: Continue Singulair    Med verification pending for home medication.  Continue as above if appropriate once med reconciliation is complete.      DVT prophylaxis:  Medical  DVT prophylaxis orders are present.    CODE STATUS:    Code Status (Patient has no pulse and is not breathing): CPR (Attempt to Resuscitate)  Medical Interventions (Patient has pulse or is breathing): Full Support    Admission Status:  I believe this patient meets observation   status.    I discussed the patient's findings and my recommendations with patient and nursing staff.    This patient has been examined wearing appropriate Personal Protective Equipment. 06/30/22      Signature: Electronically signed by TSERING Moody, 06/30/22, 11:53 PM EDT.

## 2022-07-01 NOTE — PLAN OF CARE
Goal Outcome Evaluation:  Plan of Care Reviewed With: patient        Progress: improving  Outcome Evaluation: Patient arousable. Alert to self. Pleasant and cooperative. Performing orthostatic blood pressures. Up out of bed, assist with walker, person x1. Tolerated ambulation to chair. Patient said it was good to up in chair. Emotional support provided. Call to son, Breezy, to try to get home medication list. Left voicemail. Have not received a return call as of now.

## 2022-07-01 NOTE — PROGRESS NOTES
[facility] Medicine Services Daily Progress Note    Patient Name: Odilia Zuniga  : 1945  MRN: [id number]  Primary Care Physician:  Zonia Ch DO  Date of admission: 2022      Subjective      Chief Complaint: Fall    2022  Patient appears to be at baseline dementia at bedside without distress. Orthostats positive, suspect exacerbated by home meds.  Start fludrocortisone.    Review of Systems   Unable to perform ROS: dementia          Objective      Vitals:   Temp:  [97.9 °F (36.6 °C)-98.3 °F (36.8 °C)] 97.9 °F (36.6 °C)  Heart Rate:  [59-86] 75  Resp:  [14-18] 18  BP: ()/() 98/53    Physical Exam  Constitutional:       General: She is not in acute distress.     Appearance: She is not toxic-appearing.      Comments: AAOx2-3 with poor cognition, appears at baseline  Thin/frail   HENT:      Head: Normocephalic and atraumatic.      Nose: Nose normal. No congestion.      Mouth/Throat:      Pharynx: Oropharynx is clear. No oropharyngeal exudate.   Eyes:      General: No scleral icterus.  Cardiovascular:      Rate and Rhythm: Normal rate and regular rhythm.      Heart sounds: No murmur heard.    No friction rub. No gallop.   Pulmonary:      Effort: No respiratory distress.      Breath sounds: No wheezing or rales.   Abdominal:      General: There is no distension.      Tenderness: There is no abdominal tenderness. There is no guarding.   Musculoskeletal:         General: No swelling or deformity.      Cervical back: Normal range of motion. No rigidity.      Right lower leg: No edema.      Left lower leg: No edema.   Skin:     Coloration: Skin is not jaundiced.      Findings: No bruising or lesion.   Neurological:      General: No focal deficit present.      Mental Status: She is alert. Mental status is at baseline.      Motor: Weakness present.           Result Review    Result Review:  I have personally reviewed the results from the time of this admission  to 7/1/2022 11:15 EDT and agree with these findings:  [x]  Laboratory  []  Microbiology  []  Radiology  [x]  EKG/Telemetry   []  Cardiology/Vascular   []  Pathology  []  Old records  []  Other:            Assessment & Plan      Brief Patient Summary:  Odilia Zuniga is a 76 y.o. female who presented due to fall 2/2 orthostatic hypotension      fludrocortisone, 100 mcg, Oral, Daily  heparin (porcine), 5,000 Units, Subcutaneous, Q12H  sodium chloride, 10 mL, Intravenous, Q12H       sodium chloride, 100 mL/hr, Last Rate: 100 mL/hr (07/01/22 1103)         Active Hospital Problems:  Active Hospital Problems    Diagnosis    • Fall, initial encounter      Plan:     #Fall  #Orthostatic hypotension    - Admits to fall at home, also had fall 3 months ago    - denies trauma    - Orthostats: 123/56 > 109/59 > 98/53    - start fludrocortisone 0.1mg PO daily    - PT    - hold home Lopressor, suspect might be contributing to falls    #Dementia    - Resume home Namenda    #Hypertension    - hold home Lopressor as might be contributing to Orthostatic hypotension    - Patient with /81 on admission but has resolved without medications, possibly reactive due to fall    #IBS     - hold home Bentyl for time being    #Anxiety    - hold home Celexa      DVT prophylaxis: Heparin  Medical DVT prophylaxis orders are present.    CODE STATUS:    Code Status (Patient has no pulse and is not breathing): CPR (Attempt to Resuscitate)  Medical Interventions (Patient has pulse or is breathing): Full Support      Disposition:  I expect patient to be discharged in 1-2 days.    This patient has been examined wearing appropriate Personal Protective Equipment and discussed with Patient. 07/01/22      Electronically signed by Steven Ch DO, 07/01/22, 11:15 EDT.  [facility] Hospitalist Team

## 2022-07-02 LAB
ANION GAP SERPL CALCULATED.3IONS-SCNC: 9 MMOL/L (ref 5–15)
BACTERIA UR QL AUTO: ABNORMAL /HPF
BILIRUB UR QL STRIP: NEGATIVE
BUN SERPL-MCNC: 15 MG/DL (ref 8–23)
BUN/CREAT SERPL: 25.9 (ref 7–25)
CALCIUM SPEC-SCNC: 8 MG/DL (ref 8.6–10.5)
CHLORIDE SERPL-SCNC: 110 MMOL/L (ref 98–107)
CLARITY UR: CLEAR
CO2 SERPL-SCNC: 24 MMOL/L (ref 22–29)
COLOR UR: YELLOW
CREAT SERPL-MCNC: 0.58 MG/DL (ref 0.57–1)
DEPRECATED RDW RBC AUTO: 41.6 FL (ref 37–54)
EGFRCR SERPLBLD CKD-EPI 2021: 93.9 ML/MIN/1.73
ERYTHROCYTE [DISTWIDTH] IN BLOOD BY AUTOMATED COUNT: 14.7 % (ref 12.3–15.4)
GLUCOSE SERPL-MCNC: 99 MG/DL (ref 65–99)
GLUCOSE UR STRIP-MCNC: NEGATIVE MG/DL
HCT VFR BLD AUTO: 30.8 % (ref 34–46.6)
HGB BLD-MCNC: 10.4 G/DL (ref 12–15.9)
HGB UR QL STRIP.AUTO: NEGATIVE
HYALINE CASTS UR QL AUTO: ABNORMAL /LPF
KETONES UR QL STRIP: NEGATIVE
LEUKOCYTE ESTERASE UR QL STRIP.AUTO: ABNORMAL
MCH RBC QN AUTO: 27.4 PG (ref 26.6–33)
MCHC RBC AUTO-ENTMCNC: 33.7 G/DL (ref 31.5–35.7)
MCV RBC AUTO: 81.3 FL (ref 79–97)
NITRITE UR QL STRIP: POSITIVE
PH UR STRIP.AUTO: 6.5 [PH] (ref 5–8)
PLATELET # BLD AUTO: 237 10*3/MM3 (ref 140–450)
PMV BLD AUTO: 7.9 FL (ref 6–12)
POTASSIUM SERPL-SCNC: 3.6 MMOL/L (ref 3.5–5.2)
PROT UR QL STRIP: NEGATIVE
RBC # BLD AUTO: 3.79 10*6/MM3 (ref 3.77–5.28)
RBC # UR STRIP: ABNORMAL /HPF
REF LAB TEST METHOD: ABNORMAL
SODIUM SERPL-SCNC: 143 MMOL/L (ref 136–145)
SP GR UR STRIP: 1.02 (ref 1–1.03)
SQUAMOUS #/AREA URNS HPF: ABNORMAL /HPF
UROBILINOGEN UR QL STRIP: ABNORMAL
WBC # UR STRIP: ABNORMAL /HPF
WBC NRBC COR # BLD: 5.3 10*3/MM3 (ref 3.4–10.8)

## 2022-07-02 PROCEDURE — 97530 THERAPEUTIC ACTIVITIES: CPT

## 2022-07-02 PROCEDURE — 25010000002 HEPARIN (PORCINE) PER 1000 UNITS: Performed by: STUDENT IN AN ORGANIZED HEALTH CARE EDUCATION/TRAINING PROGRAM

## 2022-07-02 PROCEDURE — G0378 HOSPITAL OBSERVATION PER HR: HCPCS

## 2022-07-02 PROCEDURE — 80048 BASIC METABOLIC PNL TOTAL CA: CPT | Performed by: STUDENT IN AN ORGANIZED HEALTH CARE EDUCATION/TRAINING PROGRAM

## 2022-07-02 PROCEDURE — 99232 SBSQ HOSP IP/OBS MODERATE 35: CPT | Performed by: INTERNAL MEDICINE

## 2022-07-02 PROCEDURE — 81001 URINALYSIS AUTO W/SCOPE: CPT | Performed by: INTERNAL MEDICINE

## 2022-07-02 PROCEDURE — 87086 URINE CULTURE/COLONY COUNT: CPT | Performed by: INTERNAL MEDICINE

## 2022-07-02 PROCEDURE — 87088 URINE BACTERIA CULTURE: CPT | Performed by: INTERNAL MEDICINE

## 2022-07-02 PROCEDURE — 87186 SC STD MICRODIL/AGAR DIL: CPT | Performed by: INTERNAL MEDICINE

## 2022-07-02 PROCEDURE — 25010000002 ONDANSETRON PER 1 MG: Performed by: NURSE PRACTITIONER

## 2022-07-02 PROCEDURE — 85027 COMPLETE CBC AUTOMATED: CPT | Performed by: STUDENT IN AN ORGANIZED HEALTH CARE EDUCATION/TRAINING PROGRAM

## 2022-07-02 RX ADMIN — ONDANSETRON 4 MG: 2 INJECTION INTRAMUSCULAR; INTRAVENOUS at 22:42

## 2022-07-02 RX ADMIN — Medication 10 ML: at 21:21

## 2022-07-02 RX ADMIN — FLUDROCORTISONE ACETATE 100 MCG: 0.1 TABLET ORAL at 08:42

## 2022-07-02 RX ADMIN — Medication 10 ML: at 08:43

## 2022-07-02 RX ADMIN — HEPARIN SODIUM 5000 UNITS: 5000 INJECTION INTRAVENOUS; SUBCUTANEOUS at 08:42

## 2022-07-02 NOTE — PLAN OF CARE
Goal Outcome Evaluation:  Plan of Care Reviewed With: patient     Patient has been pleasant. Patient is safe, bed alarm is on. Patient is still confused on time and date but is alert to self. Patient is eating. I called son for med rec and other admission information and son was unable to answer questions. He did state that her will have his wife call for answers.

## 2022-07-02 NOTE — PLAN OF CARE
Goal Outcome Evaluation:           Progress: no change  Outcome Evaluation: Patient slept comfortably all night without issue

## 2022-07-02 NOTE — PROGRESS NOTES
[facility] Medicine Services Daily Progress Note    Patient Name: Odilia Zuniga  : 1945  MRN: [id number]  Primary Care Physician:  Zonia Ch DO  Date of admission: 2022      Subjective      Chief Complaint: Fall    2022  Patient appears to be at baseline dementia at bedside without distress. Orthostats positive, suspect exacerbated by home meds.  Start fludrocortisone.      Patient seen and examined  Vitals and labs reviewed  Blood pressure now elevated  Pleasantly confused cooperative.      Review of Systems   Unable to perform ROS: dementia          Objective      Vitals:   Temp:  [97.1 °F (36.2 °C)-98.6 °F (37 °C)] 97.1 °F (36.2 °C)  Heart Rate:  [58-77] 58  Resp:  [15-17] 17  BP: (143-169)/(69-84) 155/77    Physical Exam  Constitutional:       General: She is not in acute distress.     Appearance: She is not toxic-appearing.      Comments: AAOx2-3 with poor cognition, appears at baseline  Thin/frail   HENT:      Head: Normocephalic and atraumatic.      Nose: Nose normal. No congestion.      Mouth/Throat:      Pharynx: Oropharynx is clear. No oropharyngeal exudate.   Eyes:      General: No scleral icterus.  Cardiovascular:      Rate and Rhythm: Normal rate and regular rhythm.      Heart sounds: No murmur heard.    No friction rub. No gallop.   Pulmonary:      Effort: No respiratory distress.      Breath sounds: No wheezing or rales.   Abdominal:      General: There is no distension.      Tenderness: There is no abdominal tenderness. There is no guarding.   Musculoskeletal:         General: No swelling or deformity.      Cervical back: Normal range of motion. No rigidity.      Right lower leg: No edema.      Left lower leg: No edema.   Skin:     Coloration: Skin is not jaundiced.      Findings: No bruising or lesion.   Neurological:      General: No focal deficit present.      Mental Status: She is alert. Mental status is at baseline.      Motor:  Weakness present.           Result Review    Result Review:  I have personally reviewed the results from the time of this admission to 7/2/2022 11:24 EDT and agree with these findings:  [x]  Laboratory  []  Microbiology  []  Radiology  [x]  EKG/Telemetry   []  Cardiology/Vascular   []  Pathology  []  Old records  []  Other:            Assessment & Plan      Brief Patient Summary:  Odilia Zuniga is a 76 y.o. female who presented due to fall 2/2 orthostatic hypotension      fludrocortisone, 100 mcg, Oral, Daily  heparin (porcine), 5,000 Units, Subcutaneous, Q12H  sodium chloride, 10 mL, Intravenous, Q12H       sodium chloride, 100 mL/hr, Last Rate: 100 mL/hr (07/01/22 2029)         Active Hospital Problems:  Active Hospital Problems    Diagnosis    • Fall, initial encounter      Plan:     #Fall, left frontal scalp hematoma on CT scan.  Negative CT head and cervical spine for other acute abnormalities.  #Orthostatic hypotension    - Admits to fall at home, also had fall 3 months ago    - denies trauma    - Orthostats: 123/56 > 109/59 > 98/53    - start fludrocortisone 0.1mg PO daily    - PT    - hold home Lopressor, suspect might be contributing to falls  -Celexa.  On hold.  Might be culprit as well  - Monitor off of IV fluids.    #Dementia    - Resume home Namenda  Unknown baseline  Will discuss with family    #Hypertension    - hold home Lopressor as might be contributing to Orthostatic hypotension    - Patient with /81 on admission but has resolved without medications, possibly reactive due to fall    #IBS     -Avoid Bentyl if not needed given her age and multiple medications on board.    #Anxiety    - hold home Celexa    DC Plan: [place] [facility] accepted 7/1, [facility] following ( no bed available until at least Tuesday 7/5) No precert needed. PASRR per facility      DVT prophylaxis: hold  Heparin  due to scalp hematoma        Medical DVT prophylaxis orders are present.    CODE STATUS:    Code Status  (Patient has no pulse and is not breathing): CPR (Attempt to Resuscitate)  Medical Interventions (Patient has pulse or is breathing): Full Support      Disposition:  I expect patient to be discharged in 1-2 days.    This patient has been examined wearing appropriate Personal Protective Equipment and discussed with Patient. 07/02/22      Electronically signed by Nazario Low MD, 07/02/22, 11:24 EDT.  [facility]

## 2022-07-02 NOTE — THERAPY TREATMENT NOTE
"Subjective: Pt agreeable to therapeutic plan of care.    Objective:     Bed mobility - Min-A with frequent verbal and tactile cues on task sequencing.     Transfers - Min-A and with rolling walker. Cued on hand placement.     Ambulation - 5 feet Min-A and with rolling walker. B LE weakness and with short, discontinuous steps towards recliner chair. Low activity tolerance.     Vitals: WNL    Pain: 0 VAS  Education: Provided education on importance of mobility and skilled verbal / tactile cueing throughout intervention.     Assessment: Odilia Zuniga presents with functional mobility impairments which indicate the need for skilled intervention. Tolerating session today without incident. Pt required min A for all functional mobility and amb is limited by B LE weakness and low activity tolerance. Pt is at a high risk for falls and would benefit from skilled rehab to address functional deficits. Will continue to follow and progress as tolerated.     Plan/Recommendations:   Moderate Intensity Therapy recommended post-acute care. This is recommended as therapy feels the patient would require 3-4 days per week and wouldn't tolerate \"3 hour daily\" rehab intensity. SNF would be the preferred choice. If the patient does not agree to SNF, arrange HH or OP depending on home bound status. If patient is medically complex, consider LTACH.. Pt requires no DME at discharge.     Pt desires Skilled Rehab placement at discharge. Pt cooperative; agreeable to therapeutic recommendations and plan of care.         Basic Mobility 6-click:  Rollin = Total, A lot = 2, A little = 3; 4 = None  Supine>Sit:   1 = Total, A lot = 2, A little = 3; 4 = None   Sit>Stand with arms:  1 = Total, A lot = 2, A little = 3; 4 = None  Bed>Chair:   1 = Total, A lot = 2, A little = 3; 4 = None  Ambulate in room:  1 = Total, A lot = 2, A little = 3; 4 = None  3-5 Steps with railin = Total, A lot = 2, A little = 3; 4 = None  Score: " 13    Modified [place]: N/A = No pre-op stroke/TIA    Post-Tx Position: Up in Chair, Alarms activated and Call light and personal items within reach  PPE: gloves, surgical mask, eyewear protection     negative...

## 2022-07-02 NOTE — CONSULTS
"Nutrition Services    Patient Name: Odilia Zuniga  YOB: 1945  MRN: [id number]  Admission date: 6/30/2022    Comment:    Boost Plus BID (Provides 720 kcals, 28 g protein if consumed)       PPE Documentation        PPE Worn By Provider N/A, did not enter pt's room this date    PPE Worn By Patient  N/A     CLINICAL NUTRITION ASSESSMENT      Reason for Assessment 7/2: Low BMI, DAVID consult   H&P      Past Medical History:   Diagnosis Date   • Dementia (HCC)    • Hypertension        History reviewed. No pertinent surgical history.     Current Problems   Fall    Orthostatic hypotension    Dementia    HTN    IBS    Anxiety       Encounter Information        Trending Narrative     7/2: Pt working wit therapy at time of visit, chart reviewed. Will follow up for malnutrition assessment when able.     Anthropometrics        Current Height, Weight Height: 167.6 cm (66\")  Weight: 45.4 kg (100 lb) (06/30/22 1843)       Ideal Body Weight (IBW) 130lb   Usual Body Weight (UBW) COLE       Trending Weight Hx     This admission: 7/2: new admit, current admit weight elevated from previous admit weight             PTA: 7/2: current weight stable x4 months    Wt Readings from Last 30 Encounters:   06/30/22 1843 45.4 kg (100 lb)   05/09/22 0322 39.4 kg (86 lb 12.8 oz)   05/08/22 0352 39.3 kg (86 lb 11.2 oz)   05/07/22 0059 42 kg (92 lb 8 oz)   05/06/22 1809 44.1 kg (97 lb 3.6 oz)   03/11/22 1557 46.6 kg (102 lb 11.8 oz)   11/26/21 1308 46.1 kg (101 lb 10.1 oz)      BMI kg/m2 Body mass index is 16.14 kg/m².       Labs        Pertinent Labs    Results from last 7 days   Lab Units 07/02/22  0401 07/01/22  0248 06/30/22 2011   SODIUM mmol/L 143 140 141   POTASSIUM mmol/L 3.6 4.0 4.0   CHLORIDE mmol/L 110* 104 105   CO2 mmol/L 24.0 25.0 27.0   BUN mg/dL 15 21 24*   CREATININE mg/dL 0.58 0.71 0.69   CALCIUM mg/dL 8.0* 8.6 8.8   BILIRUBIN mg/dL  --   --  0.2   ALK PHOS U/L  --   --  58   ALT (SGPT) U/L  --   --  7   AST " (SGOT) U/L  --   --  15   GLUCOSE mg/dL 99 85 100*     Results from last 7 days   Lab Units 07/02/22  0401   HEMOGLOBIN g/dL 10.4*   HEMATOCRIT % 30.8*     COVID19   Date Value Ref Range Status   06/30/2022 Not Detected Not Detected - Ref. Range Final     No results found for: HGBA1C     Medications    Scheduled Medications fludrocortisone, 100 mcg, Oral, Daily  sodium chloride, 10 mL, Intravenous, Q12H        Infusions      PRN Medications •  acetaminophen **OR** acetaminophen **OR** acetaminophen  •  ondansetron **OR** ondansetron  •  [COMPLETED] Insert peripheral IV **AND** sodium chloride  •  sodium chloride     Physical Findings        Trending Physical   Appearance, NFPE 7/2: Unable to assess in person this date, pt will likely meet severe malnutrition criteria per previous assessment.   --  Edema  none     Bowel Function Small BM 7/2   Tubes none   Chewing/Swallowing No issues reported   Skin No breakdown   --  Current Nutrition Orders & Evaluation of Intake       Oral Nutrition     Food Allergies NKFA   Current PO Diet Diet Regular   Supplement    PO Evaluation     Trending % PO Intake %   --  Nutritional Risk Screening        NRS-2002 Score          Nutrition Diagnosis         Nutrition Dx Problem 1 Underweight related to likely chronic poor intake with hx of dementia as evidenced by BMI 16.14.      Nutrition Dx Problem 2        Intervention Goal         Intervention Goal(s)      Nutrition Intervention        RD Action Add Boost Plus BID     Nutrition Prescription          Diet Prescription Regular   Supplement Prescription    --  Monitor/Evaluation        Monitor PO intake, Supplement intake, Pertinent labs, Weight, Skin status, GI status         Electronically signed by:  Tamy Hammond RD  07/02/22 12:52 EDT

## 2022-07-02 NOTE — PLAN OF CARE
"Odilia Zuniga presents with functional mobility impairments which indicate the need for skilled intervention. Tolerating session today without incident. Pt required min A for all functional mobility and amb is limited by B LE weakness and low activity tolerance. Pt is at a high risk for falls and would benefit from skilled rehab to address functional deficits. Will continue to follow and progress as tolerated.     Plan/Recommendations:   Moderate Intensity Therapy recommended post-acute care. This is recommended as therapy feels the patient would require 3-4 days per week and wouldn't tolerate \"3 hour daily\" rehab intensity. SNF would be the preferred choice. If the patient does not agree to SNF, arrange HH or OP depending on home bound status. If patient is medically complex, consider LTACH.. Pt requires no DME at discharge.     Pt desires Skilled Rehab placement at discharge. Pt cooperative; agreeable to therapeutic recommendations and plan of care.   "

## 2022-07-03 ENCOUNTER — APPOINTMENT (OUTPATIENT)
Dept: MRI IMAGING | Facility: HOSPITAL | Age: 77
End: 2022-07-03

## 2022-07-03 LAB — QT INTERVAL: 423 MS

## 2022-07-03 PROCEDURE — 70551 MRI BRAIN STEM W/O DYE: CPT

## 2022-07-03 PROCEDURE — G0378 HOSPITAL OBSERVATION PER HR: HCPCS

## 2022-07-03 PROCEDURE — 99232 SBSQ HOSP IP/OBS MODERATE 35: CPT | Performed by: INTERNAL MEDICINE

## 2022-07-03 PROCEDURE — 25010000002 HALOPERIDOL LACTATE PER 5 MG: Performed by: HOSPITALIST

## 2022-07-03 PROCEDURE — 25010000002 HEPARIN (PORCINE) PER 1000 UNITS: Performed by: INTERNAL MEDICINE

## 2022-07-03 RX ORDER — FLUDROCORTISONE ACETATE 0.1 MG/1
50 TABLET ORAL DAILY
Status: DISCONTINUED | OUTPATIENT
Start: 2022-07-04 | End: 2022-07-05 | Stop reason: HOSPADM

## 2022-07-03 RX ORDER — HEPARIN SODIUM 5000 [USP'U]/ML
5000 INJECTION, SOLUTION INTRAVENOUS; SUBCUTANEOUS EVERY 8 HOURS SCHEDULED
Status: DISCONTINUED | OUTPATIENT
Start: 2022-07-03 | End: 2022-07-05 | Stop reason: HOSPADM

## 2022-07-03 RX ORDER — GRANULES FOR ORAL 3 G/1
3 POWDER ORAL ONCE
Status: DISCONTINUED | OUTPATIENT
Start: 2022-07-03 | End: 2022-07-03

## 2022-07-03 RX ORDER — TRIAMTERENE AND HYDROCHLOROTHIAZIDE 37.5; 25 MG/1; MG/1
1 CAPSULE ORAL DAILY
Status: ON HOLD | COMMUNITY
End: 2022-07-04

## 2022-07-03 RX ORDER — TRAMADOL HYDROCHLORIDE 50 MG/1
50 TABLET ORAL EVERY 6 HOURS PRN
Status: ON HOLD | COMMUNITY
End: 2022-07-04

## 2022-07-03 RX ORDER — ALBUTEROL SULFATE 90 UG/1
2 AEROSOL, METERED RESPIRATORY (INHALATION) EVERY 6 HOURS PRN
Status: ON HOLD | COMMUNITY
End: 2022-07-04

## 2022-07-03 RX ORDER — CEPHALEXIN 250 MG/1
250 CAPSULE ORAL EVERY 6 HOURS SCHEDULED
Status: DISCONTINUED | OUTPATIENT
Start: 2022-07-03 | End: 2022-07-03

## 2022-07-03 RX ORDER — LEVOFLOXACIN 750 MG/1
750 TABLET ORAL EVERY 24 HOURS
Status: DISCONTINUED | OUTPATIENT
Start: 2022-07-03 | End: 2022-07-04

## 2022-07-03 RX ORDER — AMLODIPINE BESYLATE 2.5 MG/1
2.5 TABLET ORAL DAILY
Status: ON HOLD | COMMUNITY
End: 2022-07-04

## 2022-07-03 RX ORDER — HALOPERIDOL 5 MG/ML
0.5 INJECTION INTRAMUSCULAR ONCE
Status: COMPLETED | OUTPATIENT
Start: 2022-07-03 | End: 2022-07-03

## 2022-07-03 RX ORDER — HYDRALAZINE HYDROCHLORIDE 20 MG/ML
10 INJECTION INTRAMUSCULAR; INTRAVENOUS EVERY 6 HOURS PRN
Status: DISCONTINUED | OUTPATIENT
Start: 2022-07-03 | End: 2022-07-05 | Stop reason: HOSPADM

## 2022-07-03 RX ADMIN — Medication 10 ML: at 21:39

## 2022-07-03 RX ADMIN — FLUDROCORTISONE ACETATE 100 MCG: 0.1 TABLET ORAL at 08:14

## 2022-07-03 RX ADMIN — HALOPERIDOL LACTATE 0.5 MG: 5 INJECTION, SOLUTION INTRAMUSCULAR at 21:39

## 2022-07-03 RX ADMIN — HEPARIN SODIUM 5000 UNITS: 5000 INJECTION INTRAVENOUS; SUBCUTANEOUS at 15:38

## 2022-07-03 RX ADMIN — METOPROLOL TARTRATE 25 MG: 25 TABLET, FILM COATED ORAL at 04:45

## 2022-07-03 RX ADMIN — ACETAMINOPHEN 650 MG: 325 TABLET, FILM COATED ORAL at 04:45

## 2022-07-03 RX ADMIN — ACETAMINOPHEN 650 MG: 325 TABLET, FILM COATED ORAL at 00:51

## 2022-07-03 RX ADMIN — LEVOFLOXACIN 750 MG: 750 TABLET, FILM COATED ORAL at 15:37

## 2022-07-03 RX ADMIN — Medication 10 ML: at 08:15

## 2022-07-03 NOTE — PLAN OF CARE
Problem: Adult Inpatient Plan of Care  Goal: Plan of Care Review  Outcome: Ongoing, Progressing  Flowsheets (Taken 7/3/2022 1502)  Progress: improving  Plan of Care Reviewed With: patient  Outcome Evaluation: BP controlled. no other c/o. will continue to monitor.

## 2022-07-03 NOTE — PROGRESS NOTES
[facility] Medicine Services Daily Progress Note    Patient Name: Odilia Zuniga  : 1945  MRN: [id number]  Primary Care Physician:  Zonia Ch DO  Date of admission: 2022      Subjective      Chief Complaint: Fall    2022  Patient appears to be at baseline dementia at bedside without distress. Orthostats positive, suspect exacerbated by home meds.  Start fludrocortisone.      Patient seen and examined  Vitals and labs reviewed  Blood pressure now elevated  Pleasantly confused cooperative.      Blood pressure now is quite elevated  Will decrease fludrocortisone  As needed hydralazine added  If not improving may consider nephrology consultation    7/3  No new events overnight  Pleasantly confused and cooperative with exam        Review of Systems   Unable to perform ROS: dementia          Objective      Vitals:   Temp:  [97.8 °F (36.6 °C)-98.8 °F (37.1 °C)] 98.6 °F (37 °C)  Heart Rate:  [56-74] 56  Resp:  [13-18] 13  BP: (143-181)/() 166/80    Physical Exam  Constitutional:       General: She is not in acute distress.     Appearance: She is not toxic-appearing.      Comments: AAOx2-3 with poor cognition, appears at baseline  Thin/frail   HENT:      Head: Normocephalic and atraumatic.      Nose: Nose normal. No congestion.      Mouth/Throat:      Pharynx: Oropharynx is clear. No oropharyngeal exudate.   Eyes:      General: No scleral icterus.  Cardiovascular:      Rate and Rhythm: Normal rate and regular rhythm.      Heart sounds: No murmur heard.    No friction rub. No gallop.   Pulmonary:      Effort: No respiratory distress.      Breath sounds: No wheezing or rales.   Abdominal:      General: There is no distension.      Tenderness: There is no abdominal tenderness. There is no guarding.   Musculoskeletal:         General: No swelling or deformity.      Cervical back: Normal range of motion. No rigidity.      Right lower leg: No edema.      Left  lower leg: No edema.   Skin:     Coloration: Skin is not jaundiced.      Findings: No bruising or lesion.   Neurological:      General: No focal deficit present.      Mental Status: She is alert. Mental status is at baseline.      Motor: Weakness present.           Result Review    Result Review:  I have personally reviewed the results from the time of this admission to 7/3/2022 10:46 EDT and agree with these findings:  [x]  Laboratory  []  Microbiology  []  Radiology  [x]  EKG/Telemetry   []  Cardiology/Vascular   []  Pathology  []  Old records  []  Other:            Assessment & Plan      Brief Patient Summary:  Odilia Zuinga is a 76 y.o. female who presented due to fall 2/2 orthostatic hypotension      [START ON 7/4/2022] fludrocortisone, 50 mcg, Oral, Daily  heparin (porcine), 5,000 Units, Subcutaneous, Q8H  sodium chloride, 10 mL, Intravenous, Q12H             Active Hospital Problems:  Active Hospital Problems    Diagnosis    • Fall, initial encounter      Plan:     #Fall, left frontal scalp hematoma on CT scan.  Negative CT head and cervical spine for other acute abnormalities.  #Orthostatic hypotension    - Admits to fall at home, also had fall 3 months ago    - denies trauma  - X-ray hips and pelvis negative for fractures.    - Orthostats: 123/56 > 109/59 > 98/53    - start fludrocortisone 0.1mg PO daily. - Decrease to 0.05 mg due to elevated blood pressure [7/3]    - PT    - hold home Lopressor, suspect might be contributing to falls  -Celexa.  On hold.  Might be culprit as well  - Monitor off of IV fluids.    Suspect UTI  Abnormal urinalysis with 4+ bacteria 6-12 white cells positive nitrite  Difficult to assess symptoms given her advanced dementia  No urine culture available for review  Will give fosfomycin    #Dementia    - Resume home Namenda  Unknown baseline  Will discuss with family    #Hypertension    - hold home Lopressor as might be contributing to Orthostatic hypotension    - Patient with  /81 on admission but has resolved without medications, possibly reactive due to fall  As needed hydralazine added.  If not improving consider nephrologist consultation.    #IBS     -Avoid Bentyl if not needed given her age and multiple medications on board.    #Anxiety    - hold home Celexa as BP related to her orthostatic hypotension.    DC Plan: [facility] accepted 7/1, [facility] following ( no bed available until at least Tuesday 7/5) No precert needed. PASRR per facility      DVT prophylaxis: hold  Heparin  due to scalp hematoma        Medical DVT prophylaxis orders are present.    CODE STATUS:    Code Status (Patient has no pulse and is not breathing): CPR (Attempt to Resuscitate)  Medical Interventions (Patient has pulse or is breathing): Full Support      Disposition:  I expect patient to be discharged in 1-2 days.    This patient has been examined wearing appropriate Personal Protective Equipment and discussed with Patient. 07/03/22      Electronically signed by Nazario Low MD, 07/03/22, 10:46 EDT.  [facility] Hospitalist Team

## 2022-07-03 NOTE — PLAN OF CARE
Goal Outcome Evaluation:           Progress: no change  Outcome Evaluation: Pt complains of sore and pain boht legs and hip. probably due to recent fall. bruising noted right hip. Pain meds given PRN. pt BP noted to be high even with pain meds. call in to hospitalist, gave metoprolol 25mg one dose. meds not yet reconciled. son did not get in touch with nurses for med rec. will continue to monitor

## 2022-07-04 LAB
ANION GAP SERPL CALCULATED.3IONS-SCNC: 10 MMOL/L (ref 5–15)
BUN SERPL-MCNC: 18 MG/DL (ref 8–23)
BUN/CREAT SERPL: 24 (ref 7–25)
CALCIUM SPEC-SCNC: 8.7 MG/DL (ref 8.6–10.5)
CHLORIDE SERPL-SCNC: 104 MMOL/L (ref 98–107)
CO2 SERPL-SCNC: 26 MMOL/L (ref 22–29)
CREAT SERPL-MCNC: 0.75 MG/DL (ref 0.57–1)
EGFRCR SERPLBLD CKD-EPI 2021: 82.6 ML/MIN/1.73
GLUCOSE SERPL-MCNC: 104 MG/DL (ref 65–99)
POTASSIUM SERPL-SCNC: 4 MMOL/L (ref 3.5–5.2)
SODIUM SERPL-SCNC: 140 MMOL/L (ref 136–145)

## 2022-07-04 PROCEDURE — 25010000002 CEFTRIAXONE PER 250 MG: Performed by: HOSPITALIST

## 2022-07-04 PROCEDURE — 93005 ELECTROCARDIOGRAM TRACING: CPT | Performed by: INTERNAL MEDICINE

## 2022-07-04 PROCEDURE — 25010000002 HYDRALAZINE PER 20 MG: Performed by: INTERNAL MEDICINE

## 2022-07-04 PROCEDURE — 25010000002 HEPARIN (PORCINE) PER 1000 UNITS: Performed by: INTERNAL MEDICINE

## 2022-07-04 PROCEDURE — 99232 SBSQ HOSP IP/OBS MODERATE 35: CPT | Performed by: HOSPITALIST

## 2022-07-04 PROCEDURE — 93010 ELECTROCARDIOGRAM REPORT: CPT | Performed by: INTERNAL MEDICINE

## 2022-07-04 PROCEDURE — 80048 BASIC METABOLIC PNL TOTAL CA: CPT | Performed by: INTERNAL MEDICINE

## 2022-07-04 RX ORDER — MONTELUKAST SODIUM 10 MG/1
10 TABLET ORAL NIGHTLY
Status: DISCONTINUED | OUTPATIENT
Start: 2022-07-04 | End: 2022-07-05 | Stop reason: HOSPADM

## 2022-07-04 RX ORDER — ACETAMINOPHEN 500 MG
1000 TABLET ORAL EVERY 6 HOURS PRN
Status: ON HOLD | COMMUNITY
End: 2022-07-05 | Stop reason: SDUPTHER

## 2022-07-04 RX ORDER — CITALOPRAM 20 MG/1
20 TABLET ORAL DAILY
COMMUNITY

## 2022-07-04 RX ORDER — MEMANTINE HYDROCHLORIDE 10 MG/1
5 TABLET ORAL DAILY
Status: DISCONTINUED | OUTPATIENT
Start: 2022-07-04 | End: 2022-07-05 | Stop reason: HOSPADM

## 2022-07-04 RX ORDER — ALBUTEROL SULFATE 2.5 MG/3ML
2.5 SOLUTION RESPIRATORY (INHALATION) EVERY 6 HOURS PRN
Status: DISCONTINUED | OUTPATIENT
Start: 2022-07-04 | End: 2022-07-04

## 2022-07-04 RX ORDER — CITALOPRAM 20 MG/1
20 TABLET ORAL DAILY
Status: DISCONTINUED | OUTPATIENT
Start: 2022-07-04 | End: 2022-07-05 | Stop reason: HOSPADM

## 2022-07-04 RX ORDER — AMLODIPINE BESYLATE 2.5 MG/1
2.5 TABLET ORAL DAILY
Status: DISCONTINUED | OUTPATIENT
Start: 2022-07-04 | End: 2022-07-04

## 2022-07-04 RX ORDER — TRAMADOL HYDROCHLORIDE 50 MG/1
50 TABLET ORAL EVERY 6 HOURS PRN
Status: DISCONTINUED | OUTPATIENT
Start: 2022-07-04 | End: 2022-07-04

## 2022-07-04 RX ADMIN — HYDRALAZINE HYDROCHLORIDE 10 MG: 20 INJECTION INTRAMUSCULAR; INTRAVENOUS at 22:10

## 2022-07-04 RX ADMIN — METOPROLOL TARTRATE 25 MG: 25 TABLET, FILM COATED ORAL at 17:37

## 2022-07-04 RX ADMIN — HYDRALAZINE HYDROCHLORIDE 10 MG: 20 INJECTION INTRAMUSCULAR; INTRAVENOUS at 05:14

## 2022-07-04 RX ADMIN — ACETAMINOPHEN 650 MG: 325 TABLET, FILM COATED ORAL at 22:15

## 2022-07-04 RX ADMIN — Medication 10 ML: at 22:06

## 2022-07-04 RX ADMIN — MONTELUKAST 10 MG: 10 TABLET, FILM COATED ORAL at 22:06

## 2022-07-04 RX ADMIN — CEFTRIAXONE 1 G: 1 INJECTION, POWDER, FOR SOLUTION INTRAMUSCULAR; INTRAVENOUS at 17:36

## 2022-07-04 RX ADMIN — FLUDROCORTISONE ACETATE 50 MCG: 0.1 TABLET ORAL at 08:16

## 2022-07-04 RX ADMIN — Medication 10 ML: at 08:15

## 2022-07-04 RX ADMIN — MEMANTINE 5 MG: 10 TABLET ORAL at 17:37

## 2022-07-04 RX ADMIN — Medication 10 ML: at 17:35

## 2022-07-04 RX ADMIN — CITALOPRAM HYDROBROMIDE 20 MG: 20 TABLET ORAL at 17:36

## 2022-07-04 RX ADMIN — HEPARIN SODIUM 5000 UNITS: 5000 INJECTION INTRAVENOUS; SUBCUTANEOUS at 05:16

## 2022-07-04 RX ADMIN — HEPARIN SODIUM 5000 UNITS: 5000 INJECTION INTRAVENOUS; SUBCUTANEOUS at 14:21

## 2022-07-04 NOTE — PROGRESS NOTES
[facility] Medicine Services Daily Progress Note    Patient Name: Odilia Zuniga  : 1945  MRN: [id number]  Primary Care Physician:  Zonia Ch DO  Date of admission: 2022      Subjective      Chief Complaint: Fall with head injury      Patient Reports   2022: Patient denies current complaints.    ROS   All systems were reviewed and were negative.      Objective      Vitals:   Temp:  [98.3 °F (36.8 °C)-98.8 °F (37.1 °C)] 98.6 °F (37 °C)  Heart Rate:  [64-84] 76  Resp:  [14-17] 16  BP: (144-196)/(67-80) 155/67    Physical Exam   Vital signs and nurses notes reviewed.   Well-developed well-nourished in no acute distress sitting up in bed awake and alert; mucous membranes moist; sclerae anicteric; left forehead and periorbital area with ecchymosis; lungs clear to auscultation bilaterally; CV regeular rate and rhythm; abdomen soft nontender nondistended with active bowel sounds; extremities with no edema, cyanosis or calf tenderness; palpable pedal pulses bilaterally; neurologic exam grossly nonfocal; no Correa catheter.    Result Review    Result Review:  I have personally reviewed the results from the time of this admission to 2022 13:03 EDT and agree with these findings:  [x]  Laboratory  [x]  Microbiology  [x]  Radiology  [x]  EKG/Telemetry   [x]  Cardiology/Vascular   []  Pathology  []  Old records  []  Other:  Most notable findings discussed in the assessment and plan.          Assessment & Plan      Brief Patient Summary:  Odilia Zuniga is a 76 y.o. female who had an unwitnessed fall at home suffering a left frontal scalp hematoma.    Current medications include:  cefTRIAXone, 1 g, Intravenous, Q24H  citalopram, 20 mg, Oral, Daily  fludrocortisone, 50 mcg, Oral, Daily  heparin (porcine), 5,000 Units, Subcutaneous, Q8H  memantine, 5 mg, Oral, Daily  metoprolol tartrate, 25 mg, Oral, Daily  montelukast, 10 mg, Oral, Nightly  sodium chloride, 10 mL,  Intravenous, Q12H             Active Hospital Problems:  Active Hospital Problems    Diagnosis    • **Fall, initial encounter    • Dementia (HCC)    • Essential hypertension    • Orthostatic hypotension    • Scalp hematoma    • Severe malnutrition (HCC)      Plan:   Status post fall with blunt trauma resulting in scalp hematoma left forehead likely due to orthostatic hypotension and age-related ataxic gait, worsened by acute urinary tract infection  -Continue fludrocortisone  -Home metoprolol discontinued  -Low-dose amlodipine for hypertension  -Physical therapy consulted and recommended inpatient rehab    Acute pansensitive E. coli urinary tract infection  -Patient received Rocephin in the hospital and will be switched to appropriate oral medication at discharge    Essential hypertension, chronic  -Continue metoprolol and monitor    Dementia  Depression   -Continue home memantine and citalopram    Severe malnutrition  -Dietitian consulting  -Encourage high-protein nutritional supplements    Mild intermittent asthma  -Continue home Singulair  -Albuterol as needed    DVT prophylaxis:  Medical DVT prophylaxis orders are present.    CODE STATUS:    Code Status (Patient has no pulse and is not breathing): CPR (Attempt to Resuscitate)  Medical Interventions (Patient has pulse or is breathing): Full Support      Disposition:  I expect patient to be discharged once the urine culture is final and the rehab is arranged.    This patient has been examined wearing appropriate Personal Protective Equipment and discussed with hospital infection control department. 07/04/22      Electronically signed by Mary Tai MD, 07/04/22, 13:03 EDT.  [facility] Hospitalist Team

## 2022-07-04 NOTE — PLAN OF CARE
Goal Outcome Evaluation:           Progress: no change  Outcome Evaluation: Patient very anxious and exit seeking at the start of the shift. MD assessed in person. Patient eventually settled to sleep with PRN meds

## 2022-07-04 NOTE — PLAN OF CARE
Goal Outcome Evaluation:           Progress: improving  Outcome Evaluation: Patient calm and cooperative today, medsitter removed at 1500, will move to room across from nurses station when that room is open and cleaned.

## 2022-07-05 VITALS
RESPIRATION RATE: 18 BRPM | WEIGHT: 100 LBS | BODY MASS INDEX: 16.07 KG/M2 | OXYGEN SATURATION: 97 % | SYSTOLIC BLOOD PRESSURE: 131 MMHG | TEMPERATURE: 98.6 F | HEART RATE: 78 BPM | HEIGHT: 66 IN | DIASTOLIC BLOOD PRESSURE: 51 MMHG

## 2022-07-05 PROBLEM — N39.0 E. COLI UTI (URINARY TRACT INFECTION): Status: ACTIVE | Noted: 2022-07-05

## 2022-07-05 PROBLEM — J45.20 MILD INTERMITTENT ASTHMA: Status: ACTIVE | Noted: 2022-07-05

## 2022-07-05 PROBLEM — B96.20 E. COLI UTI (URINARY TRACT INFECTION): Status: ACTIVE | Noted: 2022-07-05

## 2022-07-05 LAB — BACTERIA SPEC AEROBE CULT: ABNORMAL

## 2022-07-05 PROCEDURE — 97110 THERAPEUTIC EXERCISES: CPT

## 2022-07-05 PROCEDURE — 99239 HOSP IP/OBS DSCHRG MGMT >30: CPT | Performed by: HOSPITALIST

## 2022-07-05 PROCEDURE — 25010000002 HEPARIN (PORCINE) PER 1000 UNITS: Performed by: INTERNAL MEDICINE

## 2022-07-05 PROCEDURE — 25010000002 HYDRALAZINE PER 20 MG: Performed by: INTERNAL MEDICINE

## 2022-07-05 RX ORDER — MEMANTINE HYDROCHLORIDE 5 MG/1
5 TABLET ORAL DAILY
Qty: 30 TABLET | Refills: 11 | Status: SHIPPED | OUTPATIENT
Start: 2022-07-05 | End: 2023-07-05

## 2022-07-05 RX ORDER — CEFDINIR 300 MG/1
300 CAPSULE ORAL EVERY 12 HOURS SCHEDULED
Status: DISCONTINUED | OUTPATIENT
Start: 2022-07-05 | End: 2022-07-05 | Stop reason: HOSPADM

## 2022-07-05 RX ORDER — ACETAMINOPHEN 325 MG/1
650 TABLET ORAL EVERY 4 HOURS PRN
Start: 2022-07-05 | End: 2022-09-27 | Stop reason: HOSPADM

## 2022-07-05 RX ORDER — CEFDINIR 300 MG/1
300 CAPSULE ORAL EVERY 12 HOURS SCHEDULED
Qty: 12 CAPSULE | Refills: 0 | Status: SHIPPED | OUTPATIENT
Start: 2022-07-05 | End: 2022-07-11

## 2022-07-05 RX ORDER — ACETAMINOPHEN 500 MG
1000 TABLET ORAL EVERY 6 HOURS PRN
Start: 2022-07-05 | End: 2023-02-24

## 2022-07-05 RX ORDER — ALBUTEROL SULFATE 90 UG/1
2 AEROSOL, METERED RESPIRATORY (INHALATION) EVERY 4 HOURS PRN
Start: 2022-07-05

## 2022-07-05 RX ORDER — FLUDROCORTISONE ACETATE 0.1 MG/1
0.1 TABLET ORAL DAILY
Status: ON HOLD
Start: 2022-07-06 | End: 2022-09-23

## 2022-07-05 RX ADMIN — CITALOPRAM HYDROBROMIDE 20 MG: 20 TABLET ORAL at 08:00

## 2022-07-05 RX ADMIN — MEMANTINE 5 MG: 10 TABLET ORAL at 08:00

## 2022-07-05 RX ADMIN — METOPROLOL TARTRATE 25 MG: 25 TABLET, FILM COATED ORAL at 08:00

## 2022-07-05 RX ADMIN — FLUDROCORTISONE ACETATE 50 MCG: 0.1 TABLET ORAL at 08:00

## 2022-07-05 RX ADMIN — HEPARIN SODIUM 5000 UNITS: 5000 INJECTION INTRAVENOUS; SUBCUTANEOUS at 14:37

## 2022-07-05 RX ADMIN — Medication 10 ML: at 09:02

## 2022-07-05 RX ADMIN — CEFDINIR 300 MG: 300 CAPSULE ORAL at 14:36

## 2022-07-05 RX ADMIN — HYDRALAZINE HYDROCHLORIDE 10 MG: 20 INJECTION INTRAMUSCULAR; INTRAVENOUS at 06:15

## 2022-07-05 NOTE — PLAN OF CARE
Goal Outcome Evaluation:  Plan of Care Reviewed With: patient           Outcome Evaluation: Pt calm today, will be discharging to [facility] @ 3pm.

## 2022-07-05 NOTE — THERAPY TREATMENT NOTE
Subjective: Pt agreeable to therapeutic plan of care.    Objective:     Bed mobility - Max-A  Transfers - N/A or Not attempted.  P/AAROM BLE in supine    Vitals: WNL    Pain: 5 VAS  Education: Provided education on importance of mobility and skilled verbal / tactile cueing throughout intervention.     Assessment: Odilia Zuniga presents with functional mobility impairments which indicate the need for skilled intervention. Patient not wanting to get out of bed and resisted attempt for supine to sit thus unable to complete activity this session. Reluctant to perform LE ex in supine as well. Tolerating session today without incident. Will continue to follow and progress as tolerated.     Plan/Recommendations:   Recommend skilled rehab at discharge. Pt requires no DME at discharge.     Pt desires Skilled Rehab placement at discharge. Pt cooperative; agreeable to therapeutic recommendations and plan of care.         Basic Mobility 6-click:  Rollin = Total, A lot = 2, A little = 3; 4 = None  Supine>Sit:   1 = Total, A lot = 2, A little = 3; 4 = None   Sit>Stand with arms:  1 = Total, A lot = 2, A little = 3; 4 = None  Bed>Chair:   1 = Total, A lot = 2, A little = 3; 4 = None  Ambulate in room:  1 = Total, A lot = 2, A little = 3; 4 = None  3-5 Steps with railin = Total, A lot = 2, A little = 3; 4 = None  Score: 8    Post-Tx Position: Supine with HOB Elevated, Alarms activated and Call light and personal items within reach  PPE: gloves, surgical mask, eyewear protection

## 2022-07-05 NOTE — DISCHARGE SUMMARY
[facility] Medicine Services  DISCHARGE SUMMARY    Patient Name: Odilia Zuniga  : 1945  MRN: [id number]    Date of Admission: 2022  Date of Discharge: 2022  Primary Care Physician: Zonia Ch DO      Presenting Problem:   Fall, initial encounter [W19.XXXA]  Contusion of forehead, initial encounter [S00.83XA]  Contusion of right hip, initial encounter [S70.01XA]  Syncope, unspecified syncope type [R55]    Active and Resolved Hospital Problems:  Active Hospital Problems    Diagnosis POA   • **Fall, initial encounter [W19.XXXA] Yes   • E. coli UTI (urinary tract infection) [N39.0, B96.20] Yes   • Mild intermittent asthma [J45.20] Yes   • Dementia (HCC) [F03.90] Yes   • Essential hypertension [I10] Yes   • Orthostatic hypotension [I95.1] Yes   • Scalp hematoma [S00.03XA] Yes   • Severe malnutrition (HCC) [E43] Yes      Resolved Hospital Problems   No resolved problems to display.     Status post fall with blunt trauma resulting in scalp hematoma left forehead likely due to orthostatic hypotension and age-related ataxic gait, worsened by acute urinary tract infection  -Continue fludrocortisone  -Home metoprolol discontinued  -Low-dose amlodipine for hypertension  -Physical therapy consulted and recommended inpatient rehab     Acute pansensitive E. coli urinary tract infection  -Patient received Rocephin in the hospital and will be switched to cefdinir at discharge (due to the patient having a penicillin allergy)     Essential hypertension, chronic and controlled  -Continue metoprolol      Dementia  Depression   -Continue home memantine and citalopram     Severe malnutrition  -Dietitian consulting  -Encourage high-protein nutritional supplements     Mild intermittent asthma  -Continue home Singulair  -Albuterol as needed    Hospital Course     Hospital Course:  Odilia Zuniga is a 76 y.o. female who had an unwitnessed fall at home suffering a left  frontal scalp hematoma.  She had no acute intracranial abnormalities on CT head.  She did have evidence of urinary tract infection and was treated with Rocephin.  She also had orthostatic hypotension and was started on Florinef.  Apparently the patient has been having recurrent falls.  Physical therapy recommended inpatient rehab.  The family is agreeable and the patient will be discharged to rehab.  The patient is felt to be in stable condition.        Day of Discharge     Vital Signs:  Temp:  [98.5 °F (36.9 °C)-99.2 °F (37.3 °C)] 98.6 °F (37 °C)  Heart Rate:  [64-85] 77  Resp:  [11-19] 18  BP: (121-184)/(52-84) 137/67    Physical Exam:  Physical Exam   Vital signs and nurses notes reviewed.     Well-developed well-nourished in no acute distress sitting up in bed awake and alert; mucous membranes moist; sclerae anicteric; left forehead and periorbital area with ecchymosis; lungs clear to auscultation bilaterally; CV regeular rate and rhythm; abdomen soft nontender nondistended with active bowel sounds; extremities with no edema, cyanosis or calf tenderness; palpable pedal pulses bilaterally; neurologic exam grossly nonfocal; no Correa catheter.  Exam unchanged from 7/4/2022.         Pertinent  and/or Most Recent Results     LAB RESULTS:      Lab 07/02/22 0401 07/01/22 0248 06/30/22 2011   WBC 5.30 6.80 6.10   HEMOGLOBIN 10.4* 10.6* 11.2*   HEMATOCRIT 30.8* 31.8* 32.6*   PLATELETS 237 243 254   NEUTROS ABS  --  3.60 4.10   LYMPHS ABS  --  2.40 1.40   MONOS ABS  --  0.70 0.50   EOS ABS  --  0.10 0.00   MCV 81.3 81.2 81.0   PROTIME  --   --  11.1   APTT  --   --  26.5         Lab 07/04/22  0017 07/02/22 0401 07/01/22 0248 06/30/22 2011   SODIUM 140 143 140 141   POTASSIUM 4.0 3.6 4.0 4.0   CHLORIDE 104 110* 104 105   CO2 26.0 24.0 25.0 27.0   ANION GAP 10.0 9.0 11.0 9.0   BUN 18 15 21 24*   CREATININE 0.75 0.58 0.71 0.69   EGFR 82.6 93.9 88.2 90.1   GLUCOSE 104* 99 85 100*   CALCIUM 8.7 8.0* 8.6 8.8   TSH  --    --  1.170  --          Lab 06/30/22 2011   TOTAL PROTEIN 6.1   ALBUMIN 4.00   GLOBULIN 2.1   ALT (SGPT) 7   AST (SGOT) 15   BILIRUBIN 0.2   ALK PHOS 58         Lab 07/01/22  0248 06/30/22 2011   TROPONIN T <0.010 <0.010   PROTIME  --  11.1   INR  --  1.08                 Brief Urine Lab Results  (Last result in the past 365 days)      Color   Clarity   Blood   Leuk Est   Nitrite   Protein   CREAT   Urine HCG        07/02/22 2252 Yellow   Clear   Negative   Trace   Positive   Negative               Microbiology Results (last 10 days)     Procedure Component Value - Date/Time    Urine Culture - Urine, Urine, Clean Catch [[id number]]  (Abnormal)  (Susceptibility) Collected: 07/02/22 2252    Lab Status: Final result Specimen: Urine, Clean Catch Updated: 07/05/22 1044     Urine Culture >100,000 CFU/mL Escherichia coli    Narrative:      Colonization of the urinary tract without infection is common. Treatment is discouraged unless the patient is symptomatic, pregnant, or undergoing an invasive urologic procedure.    Susceptibility      Escherichia coli      DONNA      Ampicillin Susceptible      Ampicillin + Sulbactam Susceptible      Cefazolin Susceptible      Cefepime Susceptible      Ceftazidime Susceptible      Ceftriaxone Susceptible      Gentamicin Susceptible      Levofloxacin Susceptible      Nitrofurantoin Susceptible      Piperacillin + Tazobactam Susceptible      Trimethoprim + Sulfamethoxazole Susceptible                           COVID PRE-OP / PRE-PROCEDURE SCREENING ORDER (NO ISOLATION) - Swab, Nasopharynx [[id number]]  (Normal) Collected: 06/30/22 2255    Lab Status: Final result Specimen: Swab from Nasopharynx Updated: 06/30/22 4644    Narrative:      The following orders were created for panel order COVID PRE-OP / PRE-PROCEDURE SCREENING ORDER (NO ISOLATION) - Swab, Nasopharynx.  Procedure                               Abnormality         Status                     ---------                                -----------         ------                     COVID-19,CEPHEID/NUSRAT,CO...[[id number]]  Normal              Final result                 Please view results for these tests on the individual orders.    COVID-19,CEPHEID/NUSRAT,COR/GLADYS/PAD/ZE IN-HOUSE(OR EMERGENT/ADD-ON),NP SWAB IN TRANSPORT MEDIA 3-4 HR TAT, RT-PCR - Swab, Nasopharynx [[id number]]  (Normal) Collected: 06/30/22 2255    Lab Status: Final result Specimen: Swab from Nasopharynx Updated: 06/30/22 2318     COVID19 Not Detected    Narrative:      Fact sheet for providers: https://www.fda.gov/media/003279/download     Fact sheet for patients: https://www.fda.gov/media/384682/download  Fact sheet for providers: https://www.fda.gov/media/802162/download    Fact sheet for patients: https://www.fda.gov/media/689469/download    Test performed by PCR.          CT Head Without Contrast    Result Date: 6/30/2022  Impression: 1. Negative for evidence of intracranial injury or skull fracture. 2. There is a left frontal scalp hematoma. 3. There is stable cerebral atrophy and moderate chronic white matter disease.  Electronically Signed By-Margarita Newman MD On:6/30/2022 9:10 PM This report was finalized on [id number] by  Margarita Newman MD.    CT Cervical Spine Without Contrast    Result Date: 6/30/2022  Impression: 1. Negative for evidence of injury to the cervical spine. 2. There is degenerative disc disease as reported above.  Electronically Signed By-Margarita Newman MD On:6/30/2022 9:15 PM This report was finalized on [id number] by  Margarita Newman MD.    MRI Brain Without Contrast    Result Date: 7/3/2022  Impression: 1.No acute intracranial process identified. 2.Findings suggestive of moderate chronic small vessel ischemic disease.  Electronically Signed By-Jacques Oliver MD On:7/3/2022 6:47 PM This report was finalized on [id number] by  Jacques Oliver MD.    XR Hip With or Without Pelvis 2 - 3 View Right    Result Date: 6/30/2022  Impression: Negative exam.  No evidence of right hip fracture.  Electronically Signed By-Margarita Newman MD On:6/30/2022 8:14 PM This report was finalized on [id number] by  Margarita Newman MD.      Results for orders placed during the hospital encounter of 09/10/19    Doppler ankle brachial index single level CAR    Interpretation Summary  · Right Conclusion: The right VERONICA is normal.  · Left Conclusion: The left VERONICA is normal.      Results for orders placed during the hospital encounter of 09/10/19    Doppler ankle brachial index single level CAR    Interpretation Summary  · Right Conclusion: The right VERONICA is normal.  · Left Conclusion: The left VERONICA is normal.          Labs Pending at Discharge:      Procedures Performed           Consults:   Consults     No orders found from 6/1/2022 to 7/1/2022.            Discharge Details        Discharge Medications      New Medications      Instructions Start Date   albuterol sulfate  (90 Base) MCG/ACT inhaler  Commonly known as: PROVENTIL HFA;VENTOLIN HFA;PROAIR HFA   2 puffs, Inhalation, Every 4 Hours PRN      cefdinir 300 MG capsule  Commonly known as: OMNICEF   300 mg, Oral, Every 12 Hours Scheduled      fludrocortisone 0.1 MG tablet   0.1 mg, Oral, Daily   Start Date: July 6, 2022        Changes to Medications      Instructions Start Date   acetaminophen 325 MG tablet  Commonly known as: TYLENOL  What changed: You were already taking a medication with the same name, and this prescription was added. Make sure you understand how and when to take each.   650 mg, Oral, Every 4 Hours PRN      acetaminophen 500 MG tablet  Commonly known as: TYLENOL  What changed: reasons to take this   1,000 mg, Oral, Every 6 Hours PRN         Continue These Medications      Instructions Start Date   citalopram 20 MG tablet  Commonly known as: CeleXA   20 mg, Oral, Daily      memantine 5 MG tablet  Commonly known as: NAMENDA   5 mg, Oral, Daily      metoprolol tartrate 25 MG tablet  Commonly known as: LOPRESSOR    25 mg, Oral, Daily      montelukast 10 MG tablet  Commonly known as: SINGULAIR   10 mg, Oral, Nightly         Stop These Medications    Potassium 99 MG tablet            Allergies   Allergen Reactions   • Penicillins Hives         Discharge Disposition:   Rehab Facility or Unit (DC - External)    Diet:  Hospital:  Diet Order   Procedures   • Diet Regular         Discharge Activity:   Activity Instructions     Activity as Tolerated              CODE STATUS:  Code Status and Medical Interventions:   Ordered at: 06/30/22 6440     Code Status (Patient has no pulse and is not breathing):    CPR (Attempt to Resuscitate)     Medical Interventions (Patient has pulse or is breathing):    Full Support         No future appointments.    Additional Instructions for the Follow-ups that You Need to Schedule     Call MD With Problems / Concerns   As directed      Instructions: Call [phone] or email [email or web address] for problems or concerns.    Order Comments: Instructions: Call [phone] or email [email or web address] for problems or concerns.          Discharge Follow-up with PCP   As directed       Currently Documented PCP:    Zonia Ch DO    PCP Phone Number:    [phone]     Follow Up Details: After released from rehab               Time spent on Discharge including face to face service:  35 minutes    This patient has been examined wearing appropriate Personal Protective Equipment and discussed with hospital infection control department. 07/05/22      Signature: Electronically signed by Mary Tai MD, 07/05/22, 1:30 PM EDT.

## 2022-07-05 NOTE — PROGRESS NOTES
Nutrition Services    Patient Name: Odilia Zuniga  YOB: 1945  MRN: [id number]  Admission date: 6/30/2022      Severe chronic disease related malnutrition related to inadequate calorie intake PTA with dementia as evidenced by physical exam revealing severe muscle/fat loss.    See MSA at bottom of note.    Patient with severe malnutrition due to likely forgetting to eat PTA with current dementia diagnosis.      PPE Documentation        PPE Worn By Provider Mask, eye protection, gloves   PPE Worn By Patient  none     PROGRESS NOTE      Encounter Information: Progress note to monitor po intake and complete NFPE. Patient is pleasantly confused at visit, full breakfast tray at bedside. Pt is not able to provide any helpful information, but did state she loved ice cream- currently with Magic Cup ordered.    NFPE completed, patient with s/s of severe malnutrition.    Severe chronic disease related malnutrition related to inadequate calorie intake PTA with dementia as evidenced by physical exam revealing severe muscle/fat loss.       PO Diet: Diet Regular   PO Supplements: Boost Plus TID (provides 1080 kcals, 42 g protein if consumed)   Magic Cups at lunch/dinner (Provides 580 kcals, 18 g protein if consumed)      PO Intake:  % documented       Nutrition support orders:    Nutrition support review:        Labs (reviewed below): reviewed        GI Function:  BM 7/4         Nutrition Intervention: Regular diet  Boost Plus TID (provides 1080 kcals, 42 g protein if consumed)   Magic Cups at lunch/dinner (Provides 580 kcals, 18 g protein if consumed)        Results from last 7 days   Lab Units 07/04/22  0017 07/02/22  0401 07/01/22  0248 06/30/22 2011   SODIUM mmol/L 140 143 140 141   POTASSIUM mmol/L 4.0 3.6 4.0 4.0   CHLORIDE mmol/L 104 110* 104 105   CO2 mmol/L 26.0 24.0 25.0 27.0   BUN mg/dL 18 15 21 24*   CREATININE mg/dL 0.75 0.58 0.71 0.69   CALCIUM mg/dL 8.7 8.0* 8.6 8.8   BILIRUBIN mg/dL  --    --   --  0.2   ALK PHOS U/L  --   --   --  58   ALT (SGPT) U/L  --   --   --  7   AST (SGOT) U/L  --   --   --  15   GLUCOSE mg/dL 104* 99 85 100*     Results from last 7 days   Lab Units 07/02/22  0401   HEMOGLOBIN g/dL 10.4*   HEMATOCRIT % 30.8*     COVID19   Date Value Ref Range Status   06/30/2022 Not Detected Not Detected - Ref. Range Final     No results found for: HGBA1C    Malnutrition Severity Assessment      Patient meets criteria for : Severe Malnutrition  Malnutrition Type (last 8 hours)     Malnutrition Severity Assessment     Row Name 07/05/22 1308       Malnutrition Severity Assessment    Malnutrition Type Chronic Disease - Related Malnutrition    Row Name 07/05/22 1308       Muscle Loss    Loss of Muscle Mass Findings Severe    [place] Region Severe - deep hollowing/scooping, lack of muscle to touch, facial bones well defined    Clavicle Bone Region Severe - protruding prominent bone    Acromion Bone Region Severe - squared shoulders, bones, and acromion process protrusion prominent    Scapular Bone Region Severe - prominent bones, depressions easily visible between ribs, scapula, spine, shoulders    Dorsal Hand Region Severe - prominent depression    Patellar Region Severe - prominent bone, square looking, very little muscle definition    Anterior Thigh Region Severe - line/depression along thigh, obviously thin    Posterior Calf Region Severe - thin with very little definition/firmness    Row Name 07/05/22 1308       Fat Loss    Subcutaneous Fat Loss Findings Severe    Orbital Region  Severe - pronounced hollowness/depression, dark circles, loose saggy skin    Upper Arm Region Severe - mostly skin, very little space between folds, fingers touch    Thoracic & Lumbar Region Severe - ribs visible with prominent depressions, iliac crest very prominent    Row Name 07/05/22 1308       Criteria Met (Must meet criteria for severity in at least 2 of these categories: M Wasting, Fat Loss, Fluid, Secondary  Signs, Wt. Status, Intake)    Patient meets criteria for  Severe Malnutrition                       RD to follow up per protocol.    Electronically signed by:  Tamy Hammond RD  07/05/22 13:04 EDT

## 2022-07-05 NOTE — CASE MANAGEMENT/SOCIAL WORK
Continued Stay Note   Daniel     Patient Name: Odliia Zuniga  MRN: [id number]  Today's Date: 7/5/2022    Admit Date: 6/30/2022     Discharge Plan     Row Name 07/05/22 1340       Plan    Plan DC Plan: [facility], accepted 7/1, bed available 7/5 after 3pm. No precert or PASRR needed.    Plan Comments CM  text  Sheree liaisonShaheen Cota to verify that a bed will be available today. She reported back that they would be unable to accept due to the patient's exit seeking and they do not have a memory care unit. CM followed up with [facility] and they reported that they are able to take the patient after 3 pm today ( med sitter discontinues on 7/4 at 1500) . [facility] the [facility] and updated the MD and nurse via [employer] chat. CM called the patient's son to provide update and review medicare letter. Physical copy left at bedside per family request.           Expected Discharge Date and Time     Expected Discharge Date Expected Discharge Time    Jul 5, 2022         Phone communication or documentation only- no physical contact with patient or family.    Lori Frye RN     Office Phone: [phone]  Office Cell: [phone]

## 2022-07-05 NOTE — PLAN OF CARE
Goal Outcome Evaluation:      Odilia Driverngway presents with functional mobility impairments which indicate the need for skilled intervention. Patient not wanting to get out of bed and resisted attempt for supine to sit thus unable to complete activity this session. Reluctant to perform LE ex in supine as well. Tolerating session today without incident. Will continue to follow and progress as tolerated.

## 2022-07-05 NOTE — CASE MANAGEMENT/SOCIAL WORK
Case Management Discharge Note      Final Note: [facility]    Provided Post Acute Provider List?: Yes  Post Acute Provider List: Inpatient Rehab  Delivered To: Support Person  Support Person: kirit Tijerina  Method of Delivery: Telephone    Selected Continued Care - Discharged on 7/5/2022 Admission date: 6/30/2022 - Discharge disposition: Rehab Facility or Unit (DC - External)    Destination Coordination complete.    Service Provider Selected Services Address Phone Fax Patient Preferred    [facility]ARE AND REHAB - [facility]  Skilled Nursing [address] [id number] [phone] --         Transportation Services  Private: Car    Final Discharge Disposition Code: 03 - skilled nursing facility (SNF)

## 2022-07-13 LAB — QT INTERVAL: 402 MS

## 2022-09-22 ENCOUNTER — HOSPITAL ENCOUNTER (INPATIENT)
Facility: HOSPITAL | Age: 77
LOS: 1 days | Discharge: NURSING, REHAB OR LONG-TERM CARE | End: 2022-09-27
Attending: EMERGENCY MEDICINE | Admitting: HOSPITALIST

## 2022-09-22 ENCOUNTER — APPOINTMENT (OUTPATIENT)
Dept: GENERAL RADIOLOGY | Facility: HOSPITAL | Age: 77
End: 2022-09-22

## 2022-09-22 ENCOUNTER — APPOINTMENT (OUTPATIENT)
Dept: CT IMAGING | Facility: HOSPITAL | Age: 77
End: 2022-09-22

## 2022-09-22 DIAGNOSIS — J44.1 ACUTE EXACERBATION OF CHRONIC OBSTRUCTIVE PULMONARY DISEASE (COPD): ICD-10-CM

## 2022-09-22 DIAGNOSIS — J18.9 PNEUMONIA OF RIGHT LOWER LOBE DUE TO INFECTIOUS ORGANISM: ICD-10-CM

## 2022-09-22 DIAGNOSIS — F03.918 SENILE DEMENTIA WITH BEHAVIORAL DISTURBANCE: ICD-10-CM

## 2022-09-22 DIAGNOSIS — N39.0 ACUTE URINARY TRACT INFECTION: Primary | ICD-10-CM

## 2022-09-22 DIAGNOSIS — E86.0 DEHYDRATION: ICD-10-CM

## 2022-09-22 LAB
ALBUMIN SERPL-MCNC: 3.8 G/DL (ref 3.5–5.2)
ALBUMIN/GLOB SERPL: 1.3 G/DL
ALP SERPL-CCNC: 72 U/L (ref 39–117)
ALT SERPL W P-5'-P-CCNC: 8 U/L (ref 1–33)
AMMONIA BLD-SCNC: 23 UMOL/L (ref 11–51)
ANION GAP SERPL CALCULATED.3IONS-SCNC: 9 MMOL/L (ref 5–15)
AST SERPL-CCNC: 14 U/L (ref 1–32)
BACTERIA UR QL AUTO: ABNORMAL /HPF
BASOPHILS # BLD AUTO: 0 10*3/MM3 (ref 0–0.2)
BASOPHILS NFR BLD AUTO: 0.1 % (ref 0–1.5)
BILIRUB SERPL-MCNC: 0.4 MG/DL (ref 0–1.2)
BILIRUB UR QL STRIP: NEGATIVE
BUN SERPL-MCNC: 26 MG/DL (ref 8–23)
BUN/CREAT SERPL: 31.3 (ref 7–25)
CALCIUM SPEC-SCNC: 9.1 MG/DL (ref 8.6–10.5)
CHLORIDE SERPL-SCNC: 101 MMOL/L (ref 98–107)
CLARITY UR: ABNORMAL
CO2 SERPL-SCNC: 29 MMOL/L (ref 22–29)
COLOR UR: ABNORMAL
CREAT SERPL-MCNC: 0.83 MG/DL (ref 0.57–1)
D-LACTATE SERPL-SCNC: 0.4 MMOL/L (ref 0.5–2)
DEPRECATED RDW RBC AUTO: 42.9 FL (ref 37–54)
EGFRCR SERPLBLD CKD-EPI 2021: 73.2 ML/MIN/1.73
EOSINOPHIL # BLD AUTO: 0 10*3/MM3 (ref 0–0.4)
EOSINOPHIL NFR BLD AUTO: 0.1 % (ref 0.3–6.2)
ERYTHROCYTE [DISTWIDTH] IN BLOOD BY AUTOMATED COUNT: 14.5 % (ref 12.3–15.4)
GLOBULIN UR ELPH-MCNC: 3 GM/DL
GLUCOSE SERPL-MCNC: 103 MG/DL (ref 65–99)
GLUCOSE UR STRIP-MCNC: NEGATIVE MG/DL
HCT VFR BLD AUTO: 36.6 % (ref 34–46.6)
HGB BLD-MCNC: 11.5 G/DL (ref 12–15.9)
HGB UR QL STRIP.AUTO: NEGATIVE
HYALINE CASTS UR QL AUTO: ABNORMAL /LPF
KETONES UR QL STRIP: ABNORMAL
LEUKOCYTE ESTERASE UR QL STRIP.AUTO: ABNORMAL
LYMPHOCYTES # BLD AUTO: 0.5 10*3/MM3 (ref 0.7–3.1)
LYMPHOCYTES NFR BLD AUTO: 2.6 % (ref 19.6–45.3)
MCH RBC QN AUTO: 26.9 PG (ref 26.6–33)
MCHC RBC AUTO-ENTMCNC: 31.4 G/DL (ref 31.5–35.7)
MCV RBC AUTO: 85.5 FL (ref 79–97)
MONOCYTES # BLD AUTO: 0.7 10*3/MM3 (ref 0.1–0.9)
MONOCYTES NFR BLD AUTO: 3.9 % (ref 5–12)
NEUTROPHILS NFR BLD AUTO: 18 10*3/MM3 (ref 1.7–7)
NEUTROPHILS NFR BLD AUTO: 93.3 % (ref 42.7–76)
NITRITE UR QL STRIP: NEGATIVE
NRBC BLD AUTO-RTO: 0 /100 WBC (ref 0–0.2)
PH UR STRIP.AUTO: 8 [PH] (ref 5–8)
PLATELET # BLD AUTO: 316 10*3/MM3 (ref 140–450)
PMV BLD AUTO: 7.8 FL (ref 6–12)
POTASSIUM SERPL-SCNC: 4.3 MMOL/L (ref 3.5–5.2)
PROCALCITONIN SERPL-MCNC: 5.41 NG/ML (ref 0–0.25)
PROT SERPL-MCNC: 6.8 G/DL (ref 6–8.5)
PROT UR QL STRIP: ABNORMAL
RBC # BLD AUTO: 4.29 10*6/MM3 (ref 3.77–5.28)
RBC # UR STRIP: ABNORMAL /HPF
REF LAB TEST METHOD: ABNORMAL
SODIUM SERPL-SCNC: 139 MMOL/L (ref 136–145)
SP GR UR STRIP: 1.02 (ref 1–1.03)
SQUAMOUS #/AREA URNS HPF: ABNORMAL /HPF
TROPONIN T SERPL-MCNC: <0.01 NG/ML (ref 0–0.03)
TSH SERPL DL<=0.05 MIU/L-ACNC: 0.4 UIU/ML (ref 0.27–4.2)
UROBILINOGEN UR QL STRIP: ABNORMAL
VALPROATE SERPL-MCNC: 17.4 MCG/ML (ref 50–125)
WBC # UR STRIP: ABNORMAL /HPF
WBC NRBC COR # BLD: 19.3 10*3/MM3 (ref 3.4–10.8)
WHOLE BLOOD HOLD COAG: NORMAL
WHOLE BLOOD HOLD SPECIMEN: NORMAL

## 2022-09-22 PROCEDURE — 87077 CULTURE AEROBIC IDENTIFY: CPT | Performed by: EMERGENCY MEDICINE

## 2022-09-22 PROCEDURE — G0378 HOSPITAL OBSERVATION PER HR: HCPCS

## 2022-09-22 PROCEDURE — 84484 ASSAY OF TROPONIN QUANT: CPT | Performed by: EMERGENCY MEDICINE

## 2022-09-22 PROCEDURE — 25010000002 METHYLPREDNISOLONE PER 125 MG: Performed by: EMERGENCY MEDICINE

## 2022-09-22 PROCEDURE — 85025 COMPLETE CBC W/AUTO DIFF WBC: CPT | Performed by: EMERGENCY MEDICINE

## 2022-09-22 PROCEDURE — 84145 PROCALCITONIN (PCT): CPT | Performed by: HOSPITALIST

## 2022-09-22 PROCEDURE — 87186 SC STD MICRODIL/AGAR DIL: CPT | Performed by: EMERGENCY MEDICINE

## 2022-09-22 PROCEDURE — 84443 ASSAY THYROID STIM HORMONE: CPT | Performed by: EMERGENCY MEDICINE

## 2022-09-22 PROCEDURE — P9612 CATHETERIZE FOR URINE SPEC: HCPCS

## 2022-09-22 PROCEDURE — 80053 COMPREHEN METABOLIC PANEL: CPT | Performed by: EMERGENCY MEDICINE

## 2022-09-22 PROCEDURE — 83605 ASSAY OF LACTIC ACID: CPT

## 2022-09-22 PROCEDURE — 87086 URINE CULTURE/COLONY COUNT: CPT | Performed by: EMERGENCY MEDICINE

## 2022-09-22 PROCEDURE — 99285 EMERGENCY DEPT VISIT HI MDM: CPT

## 2022-09-22 PROCEDURE — 82140 ASSAY OF AMMONIA: CPT | Performed by: EMERGENCY MEDICINE

## 2022-09-22 PROCEDURE — 70450 CT HEAD/BRAIN W/O DYE: CPT

## 2022-09-22 PROCEDURE — 80164 ASSAY DIPROPYLACETIC ACD TOT: CPT | Performed by: EMERGENCY MEDICINE

## 2022-09-22 PROCEDURE — 87040 BLOOD CULTURE FOR BACTERIA: CPT | Performed by: EMERGENCY MEDICINE

## 2022-09-22 PROCEDURE — 81001 URINALYSIS AUTO W/SCOPE: CPT | Performed by: EMERGENCY MEDICINE

## 2022-09-22 PROCEDURE — 71045 X-RAY EXAM CHEST 1 VIEW: CPT

## 2022-09-22 RX ORDER — ACETAMINOPHEN 325 MG/1
650 TABLET ORAL EVERY 4 HOURS PRN
Status: DISCONTINUED | OUTPATIENT
Start: 2022-09-22 | End: 2022-09-23

## 2022-09-22 RX ORDER — HEPARIN SODIUM 5000 [USP'U]/ML
5000 INJECTION, SOLUTION INTRAVENOUS; SUBCUTANEOUS EVERY 12 HOURS SCHEDULED
Status: DISCONTINUED | OUTPATIENT
Start: 2022-09-23 | End: 2022-09-27 | Stop reason: HOSPADM

## 2022-09-22 RX ORDER — BISACODYL 10 MG
10 SUPPOSITORY, RECTAL RECTAL DAILY PRN
Status: DISCONTINUED | OUTPATIENT
Start: 2022-09-22 | End: 2022-09-27 | Stop reason: HOSPADM

## 2022-09-22 RX ORDER — AMOXICILLIN 250 MG
2 CAPSULE ORAL 2 TIMES DAILY
Status: DISCONTINUED | OUTPATIENT
Start: 2022-09-23 | End: 2022-09-27 | Stop reason: HOSPADM

## 2022-09-22 RX ORDER — SODIUM CHLORIDE 0.9 % (FLUSH) 0.9 %
10 SYRINGE (ML) INJECTION AS NEEDED
Status: DISCONTINUED | OUTPATIENT
Start: 2022-09-22 | End: 2022-09-27 | Stop reason: HOSPADM

## 2022-09-22 RX ORDER — BISACODYL 5 MG/1
5 TABLET, DELAYED RELEASE ORAL DAILY PRN
Status: DISCONTINUED | OUTPATIENT
Start: 2022-09-22 | End: 2022-09-27 | Stop reason: HOSPADM

## 2022-09-22 RX ORDER — ACETAMINOPHEN 650 MG/1
650 SUPPOSITORY RECTAL EVERY 4 HOURS PRN
Status: DISCONTINUED | OUTPATIENT
Start: 2022-09-22 | End: 2022-09-23

## 2022-09-22 RX ORDER — METHYLPREDNISOLONE SODIUM SUCCINATE 125 MG/2ML
60 INJECTION, POWDER, LYOPHILIZED, FOR SOLUTION INTRAMUSCULAR; INTRAVENOUS ONCE
Status: COMPLETED | OUTPATIENT
Start: 2022-09-22 | End: 2022-09-22

## 2022-09-22 RX ORDER — ACETAMINOPHEN 160 MG/5ML
650 SOLUTION ORAL EVERY 4 HOURS PRN
Status: DISCONTINUED | OUTPATIENT
Start: 2022-09-22 | End: 2022-09-23

## 2022-09-22 RX ORDER — SODIUM CHLORIDE 0.9 % (FLUSH) 0.9 %
10 SYRINGE (ML) INJECTION EVERY 12 HOURS SCHEDULED
Status: DISCONTINUED | OUTPATIENT
Start: 2022-09-22 | End: 2022-09-27 | Stop reason: HOSPADM

## 2022-09-22 RX ORDER — ALUMINA, MAGNESIA, AND SIMETHICONE 2400; 2400; 240 MG/30ML; MG/30ML; MG/30ML
15 SUSPENSION ORAL EVERY 6 HOURS PRN
Status: DISCONTINUED | OUTPATIENT
Start: 2022-09-22 | End: 2022-09-27 | Stop reason: HOSPADM

## 2022-09-22 RX ORDER — ONDANSETRON 2 MG/ML
4 INJECTION INTRAMUSCULAR; INTRAVENOUS EVERY 6 HOURS PRN
Status: DISCONTINUED | OUTPATIENT
Start: 2022-09-22 | End: 2022-09-27 | Stop reason: HOSPADM

## 2022-09-22 RX ORDER — IPRATROPIUM BROMIDE AND ALBUTEROL SULFATE 2.5; .5 MG/3ML; MG/3ML
3 SOLUTION RESPIRATORY (INHALATION) ONCE
Status: DISCONTINUED | OUTPATIENT
Start: 2022-09-22 | End: 2022-09-27 | Stop reason: HOSPADM

## 2022-09-22 RX ORDER — ONDANSETRON 4 MG/1
4 TABLET, FILM COATED ORAL EVERY 6 HOURS PRN
Status: DISCONTINUED | OUTPATIENT
Start: 2022-09-22 | End: 2022-09-27 | Stop reason: HOSPADM

## 2022-09-22 RX ORDER — CHOLECALCIFEROL (VITAMIN D3) 125 MCG
5 CAPSULE ORAL NIGHTLY PRN
Status: DISCONTINUED | OUTPATIENT
Start: 2022-09-22 | End: 2022-09-27 | Stop reason: HOSPADM

## 2022-09-22 RX ORDER — POLYETHYLENE GLYCOL 3350 17 G/17G
17 POWDER, FOR SOLUTION ORAL DAILY PRN
Status: DISCONTINUED | OUTPATIENT
Start: 2022-09-22 | End: 2022-09-27 | Stop reason: HOSPADM

## 2022-09-22 RX ADMIN — METHYLPREDNISOLONE SODIUM SUCCINATE 60 MG: 125 INJECTION, POWDER, FOR SOLUTION INTRAMUSCULAR; INTRAVENOUS at 23:42

## 2022-09-22 RX ADMIN — SODIUM CHLORIDE 500 ML: 9 INJECTION, SOLUTION INTRAVENOUS at 23:42

## 2022-09-23 LAB
ANION GAP SERPL CALCULATED.3IONS-SCNC: 10 MMOL/L (ref 5–15)
BASOPHILS # BLD AUTO: 0 10*3/MM3 (ref 0–0.2)
BASOPHILS NFR BLD AUTO: 0.1 % (ref 0–1.5)
BUN SERPL-MCNC: 28 MG/DL (ref 8–23)
BUN/CREAT SERPL: 40 (ref 7–25)
CALCIUM SPEC-SCNC: 8.6 MG/DL (ref 8.6–10.5)
CHLORIDE SERPL-SCNC: 103 MMOL/L (ref 98–107)
CO2 SERPL-SCNC: 27 MMOL/L (ref 22–29)
CREAT SERPL-MCNC: 0.7 MG/DL (ref 0.57–1)
DEPRECATED RDW RBC AUTO: 42.4 FL (ref 37–54)
EGFRCR SERPLBLD CKD-EPI 2021: 89.8 ML/MIN/1.73
EOSINOPHIL # BLD AUTO: 0 10*3/MM3 (ref 0–0.4)
EOSINOPHIL NFR BLD AUTO: 0 % (ref 0.3–6.2)
ERYTHROCYTE [DISTWIDTH] IN BLOOD BY AUTOMATED COUNT: 14.4 % (ref 12.3–15.4)
GLUCOSE SERPL-MCNC: 104 MG/DL (ref 65–99)
HCT VFR BLD AUTO: 33.7 % (ref 34–46.6)
HGB BLD-MCNC: 10.7 G/DL (ref 12–15.9)
LYMPHOCYTES # BLD AUTO: 0.6 10*3/MM3 (ref 0.7–3.1)
LYMPHOCYTES NFR BLD AUTO: 3.9 % (ref 19.6–45.3)
MCH RBC QN AUTO: 26.6 PG (ref 26.6–33)
MCHC RBC AUTO-ENTMCNC: 31.6 G/DL (ref 31.5–35.7)
MCV RBC AUTO: 84 FL (ref 79–97)
MONOCYTES # BLD AUTO: 0.3 10*3/MM3 (ref 0.1–0.9)
MONOCYTES NFR BLD AUTO: 2.2 % (ref 5–12)
NEUTROPHILS NFR BLD AUTO: 14.6 10*3/MM3 (ref 1.7–7)
NEUTROPHILS NFR BLD AUTO: 93.8 % (ref 42.7–76)
NRBC BLD AUTO-RTO: 0 /100 WBC (ref 0–0.2)
PLATELET # BLD AUTO: 297 10*3/MM3 (ref 140–450)
PMV BLD AUTO: 7.7 FL (ref 6–12)
POTASSIUM SERPL-SCNC: 4.4 MMOL/L (ref 3.5–5.2)
RBC # BLD AUTO: 4.01 10*6/MM3 (ref 3.77–5.28)
SODIUM SERPL-SCNC: 140 MMOL/L (ref 136–145)
WBC NRBC COR # BLD: 15.5 10*3/MM3 (ref 3.4–10.8)

## 2022-09-23 PROCEDURE — 25010000002 CEFTRIAXONE PER 250 MG: Performed by: EMERGENCY MEDICINE

## 2022-09-23 PROCEDURE — G0378 HOSPITAL OBSERVATION PER HR: HCPCS

## 2022-09-23 PROCEDURE — 80048 BASIC METABOLIC PNL TOTAL CA: CPT | Performed by: HOSPITALIST

## 2022-09-23 PROCEDURE — 85025 COMPLETE CBC W/AUTO DIFF WBC: CPT | Performed by: HOSPITALIST

## 2022-09-23 PROCEDURE — 97163 PT EVAL HIGH COMPLEX 45 MIN: CPT | Performed by: PHYSICAL THERAPIST

## 2022-09-23 PROCEDURE — 25010000002 HEPARIN (PORCINE) PER 1000 UNITS: Performed by: HOSPITALIST

## 2022-09-23 PROCEDURE — 36415 COLL VENOUS BLD VENIPUNCTURE: CPT | Performed by: HOSPITALIST

## 2022-09-23 RX ORDER — MONTELUKAST SODIUM 10 MG/1
10 TABLET ORAL NIGHTLY
COMMUNITY

## 2022-09-23 RX ORDER — DIVALPROEX SODIUM 125 MG/1
125 TABLET, DELAYED RELEASE ORAL DAILY
COMMUNITY

## 2022-09-23 RX ORDER — POLYETHYLENE GLYCOL 3350 17 G/17G
17 POWDER, FOR SOLUTION ORAL DAILY PRN
COMMUNITY

## 2022-09-23 RX ORDER — AMLODIPINE BESYLATE 5 MG/1
5 TABLET ORAL
Status: DISCONTINUED | OUTPATIENT
Start: 2022-09-23 | End: 2022-09-26

## 2022-09-23 RX ORDER — ACETAMINOPHEN 500 MG
1000 TABLET ORAL 2 TIMES DAILY
Status: DISCONTINUED | OUTPATIENT
Start: 2022-09-23 | End: 2022-09-27 | Stop reason: HOSPADM

## 2022-09-23 RX ORDER — CITALOPRAM 20 MG/1
20 TABLET ORAL DAILY
Status: DISCONTINUED | OUTPATIENT
Start: 2022-09-23 | End: 2022-09-27 | Stop reason: HOSPADM

## 2022-09-23 RX ORDER — MEMANTINE HYDROCHLORIDE 10 MG/1
10 TABLET ORAL DAILY
Status: DISCONTINUED | OUTPATIENT
Start: 2022-09-23 | End: 2022-09-27 | Stop reason: HOSPADM

## 2022-09-23 RX ORDER — DIVALPROEX SODIUM 125 MG/1
125 TABLET, DELAYED RELEASE ORAL DAILY
Status: DISCONTINUED | OUTPATIENT
Start: 2022-09-23 | End: 2022-09-27 | Stop reason: HOSPADM

## 2022-09-23 RX ORDER — MONTELUKAST SODIUM 10 MG/1
10 TABLET ORAL NIGHTLY
Status: DISCONTINUED | OUTPATIENT
Start: 2022-09-23 | End: 2022-09-27 | Stop reason: HOSPADM

## 2022-09-23 RX ADMIN — CITALOPRAM HYDROBROMIDE 20 MG: 20 TABLET ORAL at 13:09

## 2022-09-23 RX ADMIN — Medication 5 MG: at 20:12

## 2022-09-23 RX ADMIN — Medication 10 ML: at 09:20

## 2022-09-23 RX ADMIN — MEMANTINE 10 MG: 10 TABLET ORAL at 10:51

## 2022-09-23 RX ADMIN — SENNOSIDES AND DOCUSATE SODIUM 2 TABLET: 50; 8.6 TABLET ORAL at 09:18

## 2022-09-23 RX ADMIN — CEFTRIAXONE 1 G: 1 INJECTION, POWDER, FOR SOLUTION INTRAMUSCULAR; INTRAVENOUS at 00:21

## 2022-09-23 RX ADMIN — AMLODIPINE BESYLATE 5 MG: 5 TABLET ORAL at 10:51

## 2022-09-23 RX ADMIN — ACETAMINOPHEN 650 MG: 325 TABLET, FILM COATED ORAL at 09:19

## 2022-09-23 RX ADMIN — CEFTRIAXONE 1 G: 1 INJECTION, POWDER, FOR SOLUTION INTRAMUSCULAR; INTRAVENOUS at 20:13

## 2022-09-23 RX ADMIN — ACETAMINOPHEN 1000 MG: 500 TABLET ORAL at 20:12

## 2022-09-23 RX ADMIN — MONTELUKAST 10 MG: 10 TABLET, FILM COATED ORAL at 20:12

## 2022-09-23 RX ADMIN — DIVALPROEX SODIUM 125 MG: 125 TABLET, DELAYED RELEASE ORAL at 10:51

## 2022-09-23 RX ADMIN — SENNOSIDES AND DOCUSATE SODIUM 2 TABLET: 50; 8.6 TABLET ORAL at 20:13

## 2022-09-23 RX ADMIN — HEPARIN SODIUM 5000 UNITS: 5000 INJECTION INTRAVENOUS; SUBCUTANEOUS at 20:12

## 2022-09-23 RX ADMIN — HEPARIN SODIUM 5000 UNITS: 5000 INJECTION INTRAVENOUS; SUBCUTANEOUS at 09:19

## 2022-09-24 LAB
ALBUMIN SERPL-MCNC: 3.7 G/DL (ref 3.5–5.2)
ALBUMIN/GLOB SERPL: 1.3 G/DL
ALP SERPL-CCNC: 80 U/L (ref 39–117)
ALT SERPL W P-5'-P-CCNC: 9 U/L (ref 1–33)
ANION GAP SERPL CALCULATED.3IONS-SCNC: 9 MMOL/L (ref 5–15)
AST SERPL-CCNC: 38 U/L (ref 1–32)
BACTERIA SPEC AEROBE CULT: ABNORMAL
BASOPHILS # BLD AUTO: 0 10*3/MM3 (ref 0–0.2)
BASOPHILS NFR BLD AUTO: 0.2 % (ref 0–1.5)
BILIRUB SERPL-MCNC: 0.2 MG/DL (ref 0–1.2)
BUN SERPL-MCNC: 27 MG/DL (ref 8–23)
BUN/CREAT SERPL: 39.1 (ref 7–25)
CALCIUM SPEC-SCNC: 8.7 MG/DL (ref 8.6–10.5)
CHLORIDE SERPL-SCNC: 101 MMOL/L (ref 98–107)
CO2 SERPL-SCNC: 30 MMOL/L (ref 22–29)
CREAT SERPL-MCNC: 0.69 MG/DL (ref 0.57–1)
DEPRECATED RDW RBC AUTO: 42 FL (ref 37–54)
EGFRCR SERPLBLD CKD-EPI 2021: 90.1 ML/MIN/1.73
EOSINOPHIL # BLD AUTO: 0 10*3/MM3 (ref 0–0.4)
EOSINOPHIL NFR BLD AUTO: 0.2 % (ref 0.3–6.2)
ERYTHROCYTE [DISTWIDTH] IN BLOOD BY AUTOMATED COUNT: 14.4 % (ref 12.3–15.4)
GLOBULIN UR ELPH-MCNC: 2.9 GM/DL
GLUCOSE SERPL-MCNC: 149 MG/DL (ref 65–99)
HCT VFR BLD AUTO: 34.6 % (ref 34–46.6)
HGB BLD-MCNC: 11.3 G/DL (ref 12–15.9)
LYMPHOCYTES # BLD AUTO: 1.3 10*3/MM3 (ref 0.7–3.1)
LYMPHOCYTES NFR BLD AUTO: 11.2 % (ref 19.6–45.3)
MCH RBC QN AUTO: 27.1 PG (ref 26.6–33)
MCHC RBC AUTO-ENTMCNC: 32.6 G/DL (ref 31.5–35.7)
MCV RBC AUTO: 83.4 FL (ref 79–97)
MONOCYTES # BLD AUTO: 0.5 10*3/MM3 (ref 0.1–0.9)
MONOCYTES NFR BLD AUTO: 4.4 % (ref 5–12)
NEUTROPHILS NFR BLD AUTO: 84 % (ref 42.7–76)
NEUTROPHILS NFR BLD AUTO: 9.6 10*3/MM3 (ref 1.7–7)
NRBC BLD AUTO-RTO: 0 /100 WBC (ref 0–0.2)
PLATELET # BLD AUTO: 312 10*3/MM3 (ref 140–450)
PMV BLD AUTO: 7.3 FL (ref 6–12)
POTASSIUM SERPL-SCNC: 3.5 MMOL/L (ref 3.5–5.2)
PROT SERPL-MCNC: 6.6 G/DL (ref 6–8.5)
RBC # BLD AUTO: 4.15 10*6/MM3 (ref 3.77–5.28)
SODIUM SERPL-SCNC: 140 MMOL/L (ref 136–145)
WBC NRBC COR # BLD: 11.5 10*3/MM3 (ref 3.4–10.8)

## 2022-09-24 PROCEDURE — 25010000002 CEFTRIAXONE PER 250 MG: Performed by: EMERGENCY MEDICINE

## 2022-09-24 PROCEDURE — G0378 HOSPITAL OBSERVATION PER HR: HCPCS

## 2022-09-24 PROCEDURE — 97530 THERAPEUTIC ACTIVITIES: CPT

## 2022-09-24 PROCEDURE — 85025 COMPLETE CBC W/AUTO DIFF WBC: CPT | Performed by: INTERNAL MEDICINE

## 2022-09-24 PROCEDURE — 25010000002 HEPARIN (PORCINE) PER 1000 UNITS: Performed by: HOSPITALIST

## 2022-09-24 PROCEDURE — 80053 COMPREHEN METABOLIC PANEL: CPT | Performed by: INTERNAL MEDICINE

## 2022-09-24 PROCEDURE — 97116 GAIT TRAINING THERAPY: CPT

## 2022-09-24 RX ADMIN — DIVALPROEX SODIUM 125 MG: 125 TABLET, DELAYED RELEASE ORAL at 08:33

## 2022-09-24 RX ADMIN — SENNOSIDES AND DOCUSATE SODIUM 2 TABLET: 50; 8.6 TABLET ORAL at 20:16

## 2022-09-24 RX ADMIN — AMLODIPINE BESYLATE 5 MG: 5 TABLET ORAL at 05:13

## 2022-09-24 RX ADMIN — CEFTRIAXONE 1 G: 1 INJECTION, POWDER, FOR SOLUTION INTRAMUSCULAR; INTRAVENOUS at 23:07

## 2022-09-24 RX ADMIN — ACETAMINOPHEN 1000 MG: 500 TABLET ORAL at 08:33

## 2022-09-24 RX ADMIN — Medication 10 ML: at 20:16

## 2022-09-24 RX ADMIN — MONTELUKAST 10 MG: 10 TABLET, FILM COATED ORAL at 20:15

## 2022-09-24 RX ADMIN — Medication 5 MG: at 20:25

## 2022-09-24 RX ADMIN — MEMANTINE 10 MG: 10 TABLET ORAL at 08:33

## 2022-09-24 RX ADMIN — HEPARIN SODIUM 5000 UNITS: 5000 INJECTION INTRAVENOUS; SUBCUTANEOUS at 08:33

## 2022-09-24 RX ADMIN — ACETAMINOPHEN 1000 MG: 500 TABLET ORAL at 20:15

## 2022-09-24 RX ADMIN — Medication 10 ML: at 08:33

## 2022-09-24 RX ADMIN — CITALOPRAM HYDROBROMIDE 20 MG: 20 TABLET ORAL at 08:33

## 2022-09-24 RX ADMIN — HEPARIN SODIUM 5000 UNITS: 5000 INJECTION INTRAVENOUS; SUBCUTANEOUS at 20:15

## 2022-09-24 RX ADMIN — SENNOSIDES AND DOCUSATE SODIUM 2 TABLET: 50; 8.6 TABLET ORAL at 08:33

## 2022-09-25 PROCEDURE — 63710000001 ONDANSETRON PER 8 MG: Performed by: HOSPITALIST

## 2022-09-25 PROCEDURE — 25010000002 HEPARIN (PORCINE) PER 1000 UNITS: Performed by: HOSPITALIST

## 2022-09-25 PROCEDURE — G0378 HOSPITAL OBSERVATION PER HR: HCPCS

## 2022-09-25 RX ADMIN — MONTELUKAST 10 MG: 10 TABLET, FILM COATED ORAL at 20:31

## 2022-09-25 RX ADMIN — HEPARIN SODIUM 5000 UNITS: 5000 INJECTION INTRAVENOUS; SUBCUTANEOUS at 20:31

## 2022-09-25 RX ADMIN — Medication 10 ML: at 09:07

## 2022-09-25 RX ADMIN — Medication 10 ML: at 20:31

## 2022-09-25 RX ADMIN — Medication 5 MG: at 20:31

## 2022-09-25 RX ADMIN — SENNOSIDES AND DOCUSATE SODIUM 2 TABLET: 50; 8.6 TABLET ORAL at 20:31

## 2022-09-25 RX ADMIN — CITALOPRAM HYDROBROMIDE 20 MG: 20 TABLET ORAL at 09:07

## 2022-09-25 RX ADMIN — HEPARIN SODIUM 5000 UNITS: 5000 INJECTION INTRAVENOUS; SUBCUTANEOUS at 09:07

## 2022-09-25 RX ADMIN — SENNOSIDES AND DOCUSATE SODIUM 2 TABLET: 50; 8.6 TABLET ORAL at 09:07

## 2022-09-25 RX ADMIN — MEMANTINE 10 MG: 10 TABLET ORAL at 09:07

## 2022-09-25 RX ADMIN — ACETAMINOPHEN 1000 MG: 500 TABLET ORAL at 09:07

## 2022-09-25 RX ADMIN — DIVALPROEX SODIUM 125 MG: 125 TABLET, DELAYED RELEASE ORAL at 09:07

## 2022-09-25 RX ADMIN — ACETAMINOPHEN 1000 MG: 500 TABLET ORAL at 20:31

## 2022-09-25 RX ADMIN — ONDANSETRON HYDROCHLORIDE 4 MG: 4 TABLET, FILM COATED ORAL at 20:31

## 2022-09-25 RX ADMIN — AMLODIPINE BESYLATE 5 MG: 5 TABLET ORAL at 09:07

## 2022-09-26 LAB
ANION GAP SERPL CALCULATED.3IONS-SCNC: 9 MMOL/L (ref 5–15)
BASOPHILS # BLD AUTO: 0 10*3/MM3 (ref 0–0.2)
BASOPHILS NFR BLD AUTO: 0.5 % (ref 0–1.5)
BUN SERPL-MCNC: 21 MG/DL (ref 8–23)
BUN/CREAT SERPL: 25 (ref 7–25)
CALCIUM SPEC-SCNC: 9.6 MG/DL (ref 8.6–10.5)
CHLORIDE SERPL-SCNC: 103 MMOL/L (ref 98–107)
CO2 SERPL-SCNC: 30 MMOL/L (ref 22–29)
CREAT SERPL-MCNC: 0.84 MG/DL (ref 0.57–1)
DEPRECATED RDW RBC AUTO: 42.4 FL (ref 37–54)
EGFRCR SERPLBLD CKD-EPI 2021: 72.1 ML/MIN/1.73
EOSINOPHIL # BLD AUTO: 0.1 10*3/MM3 (ref 0–0.4)
EOSINOPHIL NFR BLD AUTO: 1 % (ref 0.3–6.2)
ERYTHROCYTE [DISTWIDTH] IN BLOOD BY AUTOMATED COUNT: 14.5 % (ref 12.3–15.4)
GLUCOSE SERPL-MCNC: 117 MG/DL (ref 65–99)
HCT VFR BLD AUTO: 33.2 % (ref 34–46.6)
HGB BLD-MCNC: 10.9 G/DL (ref 12–15.9)
LYMPHOCYTES # BLD AUTO: 2.4 10*3/MM3 (ref 0.7–3.1)
LYMPHOCYTES NFR BLD AUTO: 42.6 % (ref 19.6–45.3)
MCH RBC QN AUTO: 27.2 PG (ref 26.6–33)
MCHC RBC AUTO-ENTMCNC: 32.9 G/DL (ref 31.5–35.7)
MCV RBC AUTO: 82.8 FL (ref 79–97)
MONOCYTES # BLD AUTO: 0.5 10*3/MM3 (ref 0.1–0.9)
MONOCYTES NFR BLD AUTO: 8.4 % (ref 5–12)
NEUTROPHILS NFR BLD AUTO: 2.7 10*3/MM3 (ref 1.7–7)
NEUTROPHILS NFR BLD AUTO: 47.5 % (ref 42.7–76)
NRBC BLD AUTO-RTO: 0 /100 WBC (ref 0–0.2)
PLATELET # BLD AUTO: 313 10*3/MM3 (ref 140–450)
PMV BLD AUTO: 7.1 FL (ref 6–12)
POTASSIUM SERPL-SCNC: 4.3 MMOL/L (ref 3.5–5.2)
RBC # BLD AUTO: 4.02 10*6/MM3 (ref 3.77–5.28)
SARS-COV-2 RNA PNL SPEC NAA+PROBE: NOT DETECTED
SODIUM SERPL-SCNC: 142 MMOL/L (ref 136–145)
WBC NRBC COR # BLD: 5.7 10*3/MM3 (ref 3.4–10.8)

## 2022-09-26 PROCEDURE — 87635 SARS-COV-2 COVID-19 AMP PRB: CPT | Performed by: INTERNAL MEDICINE

## 2022-09-26 PROCEDURE — 25010000002 HEPARIN (PORCINE) PER 1000 UNITS: Performed by: HOSPITALIST

## 2022-09-26 PROCEDURE — 85025 COMPLETE CBC W/AUTO DIFF WBC: CPT | Performed by: INTERNAL MEDICINE

## 2022-09-26 PROCEDURE — 97110 THERAPEUTIC EXERCISES: CPT

## 2022-09-26 PROCEDURE — 80048 BASIC METABOLIC PNL TOTAL CA: CPT | Performed by: INTERNAL MEDICINE

## 2022-09-26 PROCEDURE — 97116 GAIT TRAINING THERAPY: CPT

## 2022-09-26 RX ORDER — AMLODIPINE BESYLATE 5 MG/1
10 TABLET ORAL
Status: DISCONTINUED | OUTPATIENT
Start: 2022-09-26 | End: 2022-09-27 | Stop reason: HOSPADM

## 2022-09-26 RX ADMIN — ACETAMINOPHEN 1000 MG: 500 TABLET ORAL at 20:02

## 2022-09-26 RX ADMIN — ACETAMINOPHEN 1000 MG: 500 TABLET ORAL at 08:48

## 2022-09-26 RX ADMIN — Medication 10 ML: at 20:02

## 2022-09-26 RX ADMIN — Medication 5 MG: at 20:02

## 2022-09-26 RX ADMIN — DIVALPROEX SODIUM 125 MG: 125 TABLET, DELAYED RELEASE ORAL at 08:49

## 2022-09-26 RX ADMIN — SENNOSIDES AND DOCUSATE SODIUM 2 TABLET: 50; 8.6 TABLET ORAL at 08:48

## 2022-09-26 RX ADMIN — SENNOSIDES AND DOCUSATE SODIUM 2 TABLET: 50; 8.6 TABLET ORAL at 20:02

## 2022-09-26 RX ADMIN — MONTELUKAST 10 MG: 10 TABLET, FILM COATED ORAL at 20:02

## 2022-09-26 RX ADMIN — MEMANTINE 10 MG: 10 TABLET ORAL at 08:49

## 2022-09-26 RX ADMIN — Medication 10 ML: at 08:49

## 2022-09-26 RX ADMIN — CITALOPRAM HYDROBROMIDE 20 MG: 20 TABLET ORAL at 08:49

## 2022-09-26 RX ADMIN — HEPARIN SODIUM 5000 UNITS: 5000 INJECTION INTRAVENOUS; SUBCUTANEOUS at 08:49

## 2022-09-26 RX ADMIN — HEPARIN SODIUM 5000 UNITS: 5000 INJECTION INTRAVENOUS; SUBCUTANEOUS at 20:02

## 2022-09-26 RX ADMIN — AMLODIPINE BESYLATE 10 MG: 5 TABLET ORAL at 08:49

## 2022-09-27 VITALS
WEIGHT: 83 LBS | BODY MASS INDEX: 13.34 KG/M2 | RESPIRATION RATE: 18 BRPM | SYSTOLIC BLOOD PRESSURE: 134 MMHG | HEART RATE: 76 BPM | DIASTOLIC BLOOD PRESSURE: 75 MMHG | HEIGHT: 66 IN | OXYGEN SATURATION: 96 % | TEMPERATURE: 97.2 F

## 2022-09-27 LAB
BACTERIA SPEC AEROBE CULT: NORMAL
BACTERIA SPEC AEROBE CULT: NORMAL

## 2022-09-27 PROCEDURE — 25010000002 HEPARIN (PORCINE) PER 1000 UNITS: Performed by: HOSPITALIST

## 2022-09-27 PROCEDURE — 97116 GAIT TRAINING THERAPY: CPT

## 2022-09-27 PROCEDURE — 97530 THERAPEUTIC ACTIVITIES: CPT

## 2022-09-27 RX ORDER — AMLODIPINE BESYLATE 10 MG/1
10 TABLET ORAL
Start: 2022-09-28

## 2022-09-27 RX ADMIN — HEPARIN SODIUM 5000 UNITS: 5000 INJECTION INTRAVENOUS; SUBCUTANEOUS at 08:34

## 2022-09-27 RX ADMIN — Medication 10 ML: at 08:35

## 2022-09-27 RX ADMIN — SENNOSIDES AND DOCUSATE SODIUM 2 TABLET: 50; 8.6 TABLET ORAL at 08:35

## 2022-09-27 RX ADMIN — DIVALPROEX SODIUM 125 MG: 125 TABLET, DELAYED RELEASE ORAL at 08:35

## 2022-09-27 RX ADMIN — CITALOPRAM HYDROBROMIDE 20 MG: 20 TABLET ORAL at 08:35

## 2022-09-27 RX ADMIN — MEMANTINE 10 MG: 10 TABLET ORAL at 08:35

## 2022-09-27 RX ADMIN — ACETAMINOPHEN 1000 MG: 500 TABLET ORAL at 08:35

## 2022-09-27 RX ADMIN — AMLODIPINE BESYLATE 10 MG: 5 TABLET ORAL at 08:35

## 2022-12-22 ENCOUNTER — APPOINTMENT (OUTPATIENT)
Dept: GENERAL RADIOLOGY | Facility: HOSPITAL | Age: 77
End: 2022-12-22

## 2022-12-22 ENCOUNTER — HOSPITAL ENCOUNTER (EMERGENCY)
Facility: HOSPITAL | Age: 77
Discharge: HOME | End: 2022-12-22
Attending: EMERGENCY MEDICINE | Admitting: EMERGENCY MEDICINE

## 2022-12-22 ENCOUNTER — APPOINTMENT (OUTPATIENT)
Dept: CT IMAGING | Facility: HOSPITAL | Age: 77
End: 2022-12-22

## 2022-12-22 VITALS
TEMPERATURE: 98.3 F | HEART RATE: 76 BPM | BODY MASS INDEX: 14.14 KG/M2 | OXYGEN SATURATION: 97 % | DIASTOLIC BLOOD PRESSURE: 77 MMHG | HEIGHT: 66 IN | RESPIRATION RATE: 16 BRPM | WEIGHT: 88 LBS | SYSTOLIC BLOOD PRESSURE: 146 MMHG

## 2022-12-22 DIAGNOSIS — M25.572 LEFT ANKLE PAIN, UNSPECIFIED CHRONICITY: ICD-10-CM

## 2022-12-22 DIAGNOSIS — M54.50 BILATERAL LOW BACK PAIN WITHOUT SCIATICA, UNSPECIFIED CHRONICITY: ICD-10-CM
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
## 2022-12-22 DIAGNOSIS — M25.552 LEFT HIP PAIN: ICD-10-CM

## 2022-12-22 DIAGNOSIS — S00.93XA CONTUSION OF HEAD, UNSPECIFIED PART OF HEAD, INITIAL ENCOUNTER: ICD-10-CM

## 2022-12-22 LAB
ALBUMIN SERPL-MCNC: 4.2 G/DL (ref 3.5–5.2)
ALBUMIN/GLOB SERPL: 1.6 G/DL
ALP SERPL-CCNC: 60 U/L (ref 39–117)
ALT SERPL W P-5'-P-CCNC: 11 U/L (ref 1–33)
ANION GAP SERPL CALCULATED.3IONS-SCNC: 8 MMOL/L (ref 5–15)
AST SERPL-CCNC: 19 U/L (ref 1–32)
BASOPHILS # BLD AUTO: 0 10*3/MM3 (ref 0–0.2)
BASOPHILS NFR BLD AUTO: 0.4 % (ref 0–1.5)
BILIRUB SERPL-MCNC: 0.2 MG/DL (ref 0–1.2)
BILIRUB UR QL STRIP: NEGATIVE
BUN SERPL-MCNC: 15 MG/DL (ref 8–23)
BUN/CREAT SERPL: 23.1 (ref 7–25)
CALCIUM SPEC-SCNC: 8.8 MG/DL (ref 8.6–10.5)
CHLORIDE SERPL-SCNC: 102 MMOL/L (ref 98–107)
CLARITY UR: CLEAR
CO2 SERPL-SCNC: 31 MMOL/L (ref 22–29)
COLOR UR: YELLOW
CREAT SERPL-MCNC: 0.65 MG/DL (ref 0.57–1)
DEPRECATED RDW RBC AUTO: 41.1 FL (ref 37–54)
EGFRCR SERPLBLD CKD-EPI 2021: 90.8 ML/MIN/1.73
EOSINOPHIL # BLD AUTO: 0.1 10*3/MM3 (ref 0–0.4)
EOSINOPHIL NFR BLD AUTO: 2.5 % (ref 0.3–6.2)
ERYTHROCYTE [DISTWIDTH] IN BLOOD BY AUTOMATED COUNT: 14.3 % (ref 12.3–15.4)
GLOBULIN UR ELPH-MCNC: 2.7 GM/DL
GLUCOSE SERPL-MCNC: 100 MG/DL (ref 65–99)
GLUCOSE UR STRIP-MCNC: NEGATIVE MG/DL
HCT VFR BLD AUTO: 38.4 % (ref 34–46.6)
HGB BLD-MCNC: 12.1 G/DL (ref 12–15.9)
HGB UR QL STRIP.AUTO: NEGATIVE
KETONES UR QL STRIP: NEGATIVE
LEUKOCYTE ESTERASE UR QL STRIP.AUTO: NEGATIVE
LYMPHOCYTES # BLD AUTO: 1 10*3/MM3 (ref 0.7–3.1)
LYMPHOCYTES NFR BLD AUTO: 22.5 % (ref 19.6–45.3)
MAGNESIUM SERPL-MCNC: 2 MG/DL (ref 1.6–2.4)
MCH RBC QN AUTO: 26.2 PG (ref 26.6–33)
MCHC RBC AUTO-ENTMCNC: 31.4 G/DL (ref 31.5–35.7)
MCV RBC AUTO: 83.4 FL (ref 79–97)
MONOCYTES # BLD AUTO: 0.4 10*3/MM3 (ref 0.1–0.9)
MONOCYTES NFR BLD AUTO: 8.5 % (ref 5–12)
NEUTROPHILS NFR BLD AUTO: 3 10*3/MM3 (ref 1.7–7)
NEUTROPHILS NFR BLD AUTO: 66.1 % (ref 42.7–76)
NITRITE UR QL STRIP: NEGATIVE
NRBC BLD AUTO-RTO: 0 /100 WBC (ref 0–0.2)
PH UR STRIP.AUTO: 8.5 [PH] (ref 5–8)
PLATELET # BLD AUTO: 230 10*3/MM3 (ref 140–450)
PMV BLD AUTO: 7.1 FL (ref 6–12)
POTASSIUM SERPL-SCNC: 4.1 MMOL/L (ref 3.5–5.2)
PROT SERPL-MCNC: 6.9 G/DL (ref 6–8.5)
PROT UR QL STRIP: NEGATIVE
RBC # BLD AUTO: 4.6 10*6/MM3 (ref 3.77–5.28)
SODIUM SERPL-SCNC: 141 MMOL/L (ref 136–145)
SP GR UR STRIP: 1.01 (ref 1–1.03)
TROPONIN T SERPL-MCNC: <0.01 NG/ML (ref 0–0.03)
UROBILINOGEN UR QL STRIP: ABNORMAL
VALPROATE SERPL-MCNC: 15.4 MCG/ML (ref 50–125)
WBC NRBC COR # BLD: 4.6 10*3/MM3 (ref 3.4–10.8)

## 2022-12-22 PROCEDURE — 99284 EMERGENCY DEPT VISIT MOD MDM: CPT

## 2022-12-22 PROCEDURE — 80164 ASSAY DIPROPYLACETIC ACD TOT: CPT | Performed by: PHYSICIAN ASSISTANT

## 2022-12-22 PROCEDURE — 73610 X-RAY EXAM OF ANKLE: CPT

## 2022-12-22 PROCEDURE — 71045 X-RAY EXAM CHEST 1 VIEW: CPT

## 2022-12-22 PROCEDURE — 93005 ELECTROCARDIOGRAM TRACING: CPT | Performed by: PHYSICIAN ASSISTANT

## 2022-12-22 PROCEDURE — 70450 CT HEAD/BRAIN W/O DYE: CPT

## 2022-12-22 PROCEDURE — 85025 COMPLETE CBC W/AUTO DIFF WBC: CPT | Performed by: PHYSICIAN ASSISTANT

## 2022-12-22 PROCEDURE — 81003 URINALYSIS AUTO W/O SCOPE: CPT | Performed by: PHYSICIAN ASSISTANT

## 2022-12-22 PROCEDURE — 73521 X-RAY EXAM HIPS BI 2 VIEWS: CPT

## 2022-12-22 PROCEDURE — 84484 ASSAY OF TROPONIN QUANT: CPT | Performed by: PHYSICIAN ASSISTANT

## 2022-12-22 PROCEDURE — 80053 COMPREHEN METABOLIC PANEL: CPT | Performed by: PHYSICIAN ASSISTANT

## 2022-12-22 PROCEDURE — 72125 CT NECK SPINE W/O DYE: CPT

## 2022-12-22 PROCEDURE — 83735 ASSAY OF MAGNESIUM: CPT | Performed by: PHYSICIAN ASSISTANT

## 2022-12-22 PROCEDURE — P9612 CATHETERIZE FOR URINE SPEC: HCPCS

## 2022-12-22 PROCEDURE — 72110 X-RAY EXAM L-2 SPINE 4/>VWS: CPT

## 2022-12-22 RX ORDER — SODIUM CHLORIDE 0.9 % (FLUSH) 0.9 %
10 SYRINGE (ML) INJECTION AS NEEDED
Status: DISCONTINUED | OUTPATIENT
Start: 2022-12-22 | End: 2022-12-22 | Stop reason: HOSPADM

## 2022-12-22 NOTE — DISCHARGE INSTRUCTIONS
Tylenol as needed for pain    Drink plenty of fluids  Make sure to use your walker at all times when you are walking to prevent falls    Follow-up with primary care for recheck  Return to the ER for new or worsening symptoms

## 2022-12-22 NOTE — ED PROVIDER NOTES
Subjective   History of Present Illness  Chief Complaint: Fall    Patient is a 77-year-old  female history of hypertension, dementia presents to the ER per EMS with complaints of fall and head injury today.  Patient states that she was walking in her house, states that she is not sure if she tripped or got lightheaded and fell fell backwards landing on her hips and hitting the back of her head.  She denies LOC or syncope.  Patient says she does not take any blood thinners.  Patient is complaining of headache, some neck pain lower back pain and hip pain.  She rates her overall discomfort an 8/10.  She denies any numbness or tingling or weakness.  No saddle anesthesia or bladder or bowel dysfunction.  Patient states that she normally ambulates with a walker but states she thinks she may have lost her balance.  She denies any chest pain shortness of breath cough or congestion.  No abdominal pain, nausea vomiting or diarrhea.  Patient states that she does fall a lot due to feeling off balance.    PCP: None    History provided by:  Patient      Review of Systems   Constitutional: Negative for chills and fever.   HENT: Negative for sore throat and trouble swallowing.    Eyes: Negative.    Respiratory: Negative for shortness of breath and wheezing.    Cardiovascular: Negative for chest pain.   Gastrointestinal: Positive for nausea. Negative for abdominal pain and vomiting.   Endocrine: Negative.    Genitourinary: Negative for dysuria and flank pain.   Musculoskeletal: Positive for back pain and myalgias.   Skin: Negative for rash.   Allergic/Immunologic: Negative.    Neurological: Positive for dizziness and headaches.   Psychiatric/Behavioral: Negative for behavioral problems.   All other systems reviewed and are negative.      Past Medical History:   Diagnosis Date   • Dementia (HCC)    • Hypertension        Allergies   Allergen Reactions   • Penicillins Hives       Past Surgical History:   Procedure Laterality  Date   • TOE SURGERY         Family History   Problem Relation Age of Onset   • Heart disease Mother    • Diabetes Mother    • Heart disease Father        Social History     Socioeconomic History   • Marital status:    Tobacco Use   • Smoking status: Never   Vaping Use   • Vaping Use: Never used   Substance and Sexual Activity   • Alcohol use: Not Currently   • Drug use: Never   • Sexual activity: Defer           Objective   Physical Exam  Vitals and nursing note reviewed.   Constitutional:       Appearance: Normal appearance. She is well-developed and normal weight. She is not ill-appearing or toxic-appearing.   HENT:      Head: Normocephalic and atraumatic.      Right Ear: Tympanic membrane normal.      Left Ear: Tympanic membrane normal.      Nose: Nose normal. No congestion.      Mouth/Throat:      Mouth: Mucous membranes are moist.   Eyes:      Pupils: Pupils are equal, round, and reactive to light.   Neck:      Comments: Cervical spine: No midline tenderness to palpation. No step-offs or deformities. Pain-free range of motion.  Mild tenderness palpation paraspinal muscles of cervical spine  Cardiovascular:      Rate and Rhythm: Normal rate and regular rhythm.      Pulses: Normal pulses.      Heart sounds: Normal heart sounds. No murmur heard.  Pulmonary:      Effort: Pulmonary effort is normal. No respiratory distress.      Breath sounds: Normal breath sounds. No wheezing.   Abdominal:      General: Bowel sounds are normal. There is no distension.      Palpations: Abdomen is soft.      Tenderness: There is no abdominal tenderness. There is no right CVA tenderness or left CVA tenderness.   Musculoskeletal:         General: Tenderness present. No swelling. Normal range of motion.      Cervical back: Normal range of motion. Tenderness present.      Comments: Lumbar spine: No step-offs or deformities, no midline tenderness to palpation.  Tenderness to palpation bilateral lower back left greater than right  "bilateral lower extremities: Negative straight leg raise.  5 out of 5 strength.  Sensation intact to light touch.  2+ reflexes.  No clonus.  Negative Babinski sign.    Pedal pulses present 2+ bilateral  Tenderness palpation left lateral malleolus with some bruising   Skin:     General: Skin is warm and dry.      Capillary Refill: Capillary refill takes less than 2 seconds.      Findings: No rash.   Neurological:      General: No focal deficit present.      Mental Status: She is alert and oriented to person, place, and time.   Psychiatric:         Mood and Affect: Mood normal.         Behavior: Behavior normal.         ECG 12 Lead      Date/Time: 12/22/2022 7:03 PM  Performed by: Karol Mg PA  Authorized by: Yakov Padron MD   Interpreted by physician  Previous ECG: no previous ECG available  Rhythm: sinus rhythm  Rate: normal  BPM: 72  QRS axis: normal  Conduction: conduction normal  ST Segments: ST segments normal  T Waves: T waves normal  Other: no other findings  Clinical impression: non-specific ECG                 ED Course  ED Course as of 12/22/22 2113   Thu Dec 22, 2022   1646 Patient ambulated to the doorway of her exam room and back to the bed without difficulty and non-ataxic gait.  She reports feeling better on her feet.  Patient has a walker at home that she normally uses for ambulation. [MC]      ED Course User Index  [MC] Karol Mg PA    /77 (BP Location: Right arm, Patient Position: Lying)   Pulse 76   Temp 98.3 °F (36.8 °C) (Oral)   Resp 16   Ht 167.6 cm (66\")   Wt 39.9 kg (88 lb)   SpO2 97%   BMI 14.20 kg/m²   Labs Reviewed   COMPREHENSIVE METABOLIC PANEL - Abnormal; Notable for the following components:       Result Value    Glucose 100 (*)     CO2 31.0 (*)     All other components within normal limits    Narrative:     GFR Normal >60  Chronic Kidney Disease <60  Kidney Failure <15    The GFR formula is only valid for adults with stable renal function between ages 18 " and 70.   URINALYSIS W/ CULTURE IF INDICATED - Abnormal; Notable for the following components:    pH, UA 8.5 (*)     All other components within normal limits    Narrative:     In absence of clinical symptoms, the presence of pyuria, bacteria, and/or nitrites on the urinalysis result does not correlate with infection.  Urine microscopic not indicated.   VALPROIC ACID LEVEL, TOTAL - Abnormal; Notable for the following components:    Valproic Acid 15.4 (*)     All other components within normal limits    Narrative:     Therapeutic Ranges for Valproic Acid    Epilepsy:       mcg/ml  Bipolar/Payton  up to 125 mcg/ml     CBC WITH AUTO DIFFERENTIAL - Abnormal; Notable for the following components:    MCH 26.2 (*)     MCHC 31.4 (*)     All other components within normal limits   TROPONIN (IN-HOUSE) - Normal    Narrative:     Troponin T Reference Range:  <= 0.03 ng/mL-   Negative for AMI  >0.03 ng/mL-     Abnormal for myocardial necrosis.  Clinicians would have to utilize clinical acumen, EKG, Troponin and serial changes to determine if it is an Acute Myocardial Infarction or myocardial injury due to an underlying chronic condition.       Results may be falsely decreased if patient taking Biotin.     MAGNESIUM - Normal   CBC AND DIFFERENTIAL    Narrative:     The following orders were created for panel order CBC & Differential.  Procedure                               Abnormality         Status                     ---------                               -----------         ------                     CBC Auto Differential[[id number]]        Abnormal            Final result                 Please view results for these tests on the individual orders.     Medications   sodium chloride 0.9 % flush 10 mL (has no administration in time range)     XR Ankle 3+ View Left    Result Date: 12/22/2022  Negative for acute osseous abnormality.  Electronically Signed By-Carl Clemente MD On:12/22/2022 12:37 PM This report was finalized  on [id number] by  Carl Clemente MD.    CT Head Without Contrast    Result Date: 12/22/2022  1. No intracranial hemorrhage. 2. Generalized cerebral atrophy with matter findings of chronic microvascular disease, stable.  Electronically Signed By-Carl Clemente MD On:12/22/2022 1:39 PM This report was finalized on [id number] by  Carl Clemente MD.    CT Cervical Spine Without Contrast    Result Date: 12/22/2022  1. Degenerative changes of the cervical spine appears similar to 6/30/2022 examination. No acute cervical spine findings.  Electronically Signed By-Neha Marley MD On:12/22/2022 1:40 PM This report was finalized on [id number] by  Neha Marley MD.    XR Chest 1 View    Result Date: 12/22/2022  No acute process.  Electronically Signed By-Carl Clemente MD On:12/22/2022 12:35 PM This report was finalized on [id number] by  Carl Clemente MD.    XR Spine Lumbar Complete 4+VW    Result Date: 12/22/2022  1. Negative for acute osseous abnormality. 2. Dextroscoliosis and lumbar degenerative findings above.  Electronically Signed By-Carl Clemente MD On:12/22/2022 12:41 PM This report was finalized on [id number] by  Carl Clemente MD.    XR Hips Bilateral With or Without Pelvis 2 View    Result Date: 12/22/2022  Negative for acute osseous abnormality.  Electronically Signed By-Carl Clemente MD On:12/22/2022 12:38 PM This report was finalized on [id number] by  Carl Clemente MD.                                           MDM  Number of Diagnoses or Management Options  Bilateral low back pain without sciatica, unspecified chronicity  Contusion of head, unspecified part of head, initial encounter  Fall, initial encounter  Left ankle pain, unspecified chronicity  Left hip pain  Diagnosis management comments: MEDICAL DECISION  Epic Chart Review: Patient's last admission 9/22/2022 for UTI, malnutrition, dementia  Comorbidities: Dementia, hypertension  Differentials: Fall, contusion, fracture, dislocation,  intracranial hemorrhage, UTI, electrolyte abnormality; this list is not all inclusive and does not constitute the entirety of considered causes  Radiology interpretation:  Images reviewed by me and interpreted by radiologist, as above  Lab interpretation:  Labs viewed by me significant for, as above  EKG interpretation: Reviewed by myself interpreted by ER attending, sinus rhythm rate is 72 with no acute ST changes.    While in the ED IV was placed and labs were obtained appropriate PPE was worn during exam and throughout all encounters with the patient.  Patient had the above evaluation.  IV established, lab work obtained.  Patient placed on continuous telemetry monitoring ER stay.  Patient awake alert and oriented x4.  She answers questions appropriately responds to all commands.  Neurological exam grossly normal.  CBC no leukocytosis.  CMP glucose 100, CO2 31.0.  Troponin less than 0.01.  Magnesium 2.0.  CBC no leukocytosis.  Urinalysis negative for UTI.  CT head shows no acute intracranial findings.  X-ray hips, chest, spine, left ankle negative for acute osseous abnormality.  Patient was able to ambulate in her room, to the nurses station and back without difficulty or ataxic gait.  She reports feeling better.  Neurological exam grossly normal on reevaluation.  Patient is felt stable for discharge.  Recommended using her walker at home for ambulation to prevent further falls.    Discharge plan and instructions were discussed with the patient who verbalized understanding and is in agreement with the plan, all questions were answered at this time.  Patient is aware of signs symptoms that would require immediate return to the emergency room.  Patient understands importance of following up with primary care provider for further evaluation and worsening concerns as well as blood pressure recheck in the next 4 weeks.    Patient was discharged in improved stable condition with an upright steady gait.         Amount  and/or Complexity of Data Reviewed  Clinical lab tests: reviewed and ordered  Tests in the radiology section of CPT®: reviewed and ordered  Tests in the medicine section of CPT®: reviewed    Patient Progress  Patient progress: stable      Final diagnoses:   Fall, initial encounter   Contusion of head, unspecified part of head, initial encounter   Bilateral low back pain without sciatica, unspecified chronicity   Left hip pain   Left ankle pain, unspecified chronicity       ED Disposition  ED Disposition     ED Disposition   Discharge    Condition   Stable    Comment   --             PATIENT CONNECTION - [facility] [address]  [phone]  Schedule an appointment as soon as possible for a visit in 2 days  As needed, If symptoms worsen         Medication List      Changed    acetaminophen 500 MG tablet  Commonly known as: TYLENOL  Take 2 tablets by mouth Every 6 (Six) Hours As Needed for Moderate Pain .  What changed: when to take this     memantine 5 MG tablet  Commonly known as: NAMENDA  Take 1 tablet by mouth Daily.  What changed: how much to take             Karol Mg PA  12/22/22 6817

## 2022-12-23 LAB — QT INTERVAL: 428 MS

## 2023-02-08 ENCOUNTER — APPOINTMENT (OUTPATIENT)
Dept: CT IMAGING | Facility: HOSPITAL | Age: 78
End: 2023-02-08
Payer: MEDICARE

## 2023-02-08 ENCOUNTER — HOSPITAL ENCOUNTER (EMERGENCY)
Facility: HOSPITAL | Age: 78
Discharge: HOME | End: 2023-02-08
Attending: EMERGENCY MEDICINE | Admitting: EMERGENCY MEDICINE
Payer: MEDICARE

## 2023-02-08 VITALS
HEIGHT: 66 IN | DIASTOLIC BLOOD PRESSURE: 74 MMHG | BODY MASS INDEX: 14.14 KG/M2 | RESPIRATION RATE: 13 BRPM | WEIGHT: 88 LBS | HEART RATE: 82 BPM | SYSTOLIC BLOOD PRESSURE: 154 MMHG | OXYGEN SATURATION: 100 % | TEMPERATURE: 98.7 F

## 2023-02-08 DIAGNOSIS — S09.90XA CLOSED HEAD INJURY, INITIAL ENCOUNTER: Primary | ICD-10-CM

## 2023-02-08 DIAGNOSIS — S00.03XA HEMATOMA OF SCALP, INITIAL ENCOUNTER: ICD-10-CM

## 2023-02-08 PROCEDURE — 70450 CT HEAD/BRAIN W/O DYE: CPT

## 2023-02-08 PROCEDURE — 99283 EMERGENCY DEPT VISIT LOW MDM: CPT

## 2023-02-08 PROCEDURE — 72125 CT NECK SPINE W/O DYE: CPT

## 2023-02-08 NOTE — ED NOTES
Pt fell at home today, hematoma to posterior scalp.  Ems reports no anticoagulants and states pt has dementia, unsure what baseline A&O is.  Pt is Aox3, disoriented to year only.  Pt states she lives with her son and daughter in law and states that one of them was present when she fell in the kitchen today

## 2023-02-08 NOTE — ED PROVIDER NOTES
"Subjective   History of Present Illness  77-year-old female primary history of dementia, hypertension presents status post fall.  Lives with son.  Was witnessed at home.  Patient only complaint is mild headache and pain over the back head.  States she was feeling fine before hand.  Has not had any vomiting.  Denies vision changes.  Patient alert oriented to person place and situation but did not know the year.      Review of Systems   Eyes: Negative for visual disturbance.   Neurological: Positive for headaches. Negative for weakness and numbness.       Past Medical History:   Diagnosis Date   • Dementia (HCC)    • Hypertension        Allergies   Allergen Reactions   • Penicillins Hives       Past Surgical History:   Procedure Laterality Date   • TOE SURGERY         Family History   Problem Relation Age of Onset   • Heart disease Mother    • Diabetes Mother    • Heart disease Father        Social History     Socioeconomic History   • Marital status:    Tobacco Use   • Smoking status: Never   Vaping Use   • Vaping Use: Never used   Substance and Sexual Activity   • Alcohol use: Not Currently   • Drug use: Never   • Sexual activity: Defer           Objective   Physical Exam  Constitutional:  No acute distress.  Head:  Atraumatic.  Hematoma over posterior head.  Eyes:  No scleral icterus. Normal conjunctivae  ENT:  Moist mucosa.  No nasal discharge present.  Cardiovascular:  Well perfused.  Equal pulses.  Regular rate.  Normal capillary refill.    Pulmonary/Chest:  No respiratory distress.  Airway patent.  No tachypnea.  No accessory muscle usage.    Abdominal:  Nondistended. Nontender.   Extremities:  No peripheral edema.  No Deformity  Skin:  Warm, dry  Neurological: Alert to person place and situation.  Moving all extremities.    Procedures           ED Course      /74   Pulse 82   Temp 98.7 °F (37.1 °C) (Oral)   Resp 13   Ht 167.6 cm (66\")   Wt 39.9 kg (88 lb)   SpO2 100%   BMI 14.20 kg/m² "   Labs Reviewed - No data to display  Medications - No data to display  CT Head Without Contrast    Result Date: 2/8/2023  No acute intracranial abnormality. Atrophy and chronic white matter change similar to the prior study Left scalp hematoma  Electronically Signed: Jonh Tee  2/8/2023 2:03 PM EST  Workstation ID: [id number]    CT Cervical Spine Without Contrast    Result Date: 2/8/2023  Impression: No acute fracture or traumatic malalignment. Electronically Signed: Ashu Mjcharlie  2/8/2023 2:11 PM EST  Workstation ID: [id number]                                         [facility]  Differential Dx (Includes but not limited to): Contusion, scalp hematoma, closed head injury, intracranial hemorrhage, skull fracture  Medical Records Reviewed: December 22, 2022 note from Karol Gerard where patient was seen for a fall  Labs: Not indicated  Imaging: CT head without intracranial hemorrhage.  CT C-spine unremarkable.  Does have scalp hematoma.  Reviewed imaging independently and agree with radiology interpretation.  Nature of Complaint: Acute  Admission vs Discharge: We will DC  Discussion: Patient reportedly tripped and fell.  Nontoxic-appearing here.  Has no other complaints.  Will DC.    Final diagnoses:   Closed head injury, initial encounter   Hematoma of scalp, initial encounter       ED Disposition  ED Disposition     ED Disposition   Discharge    Condition   Stable    Comment   --             PATIENT CONNECTION - [facility] [address]  [phone]  In 3 days           Medication List      Changed    acetaminophen 500 MG tablet  Commonly known as: TYLENOL  Take 2 tablets by mouth Every 6 (Six) Hours As Needed for Moderate Pain .  What changed: when to take this     memantine 5 MG tablet  Commonly known as: NAMENDA  Take 1 tablet by mouth Daily.  What changed: how much to take             Yakov Padron MD  02/08/23 5668

## 2023-02-24 ENCOUNTER — HOSPITAL ENCOUNTER (EMERGENCY)
Facility: HOSPITAL | Age: 78
Discharge: HOME | End: 2023-02-24
Attending: EMERGENCY MEDICINE | Admitting: EMERGENCY MEDICINE
Payer: MEDICARE

## 2023-02-24 ENCOUNTER — APPOINTMENT (OUTPATIENT)
Dept: GENERAL RADIOLOGY | Facility: HOSPITAL | Age: 78
End: 2023-02-24
Payer: MEDICARE

## 2023-02-24 ENCOUNTER — APPOINTMENT (OUTPATIENT)
Dept: CT IMAGING | Facility: HOSPITAL | Age: 78
End: 2023-02-24
Payer: MEDICARE

## 2023-02-24 VITALS
HEIGHT: 66 IN | HEART RATE: 67 BPM | DIASTOLIC BLOOD PRESSURE: 55 MMHG | BODY MASS INDEX: 14.63 KG/M2 | TEMPERATURE: 98.2 F | OXYGEN SATURATION: 97 % | WEIGHT: 91 LBS | RESPIRATION RATE: 18 BRPM | SYSTOLIC BLOOD PRESSURE: 90 MMHG

## 2023-02-24 DIAGNOSIS — S09.90XA CLOSED HEAD INJURY, INITIAL ENCOUNTER: ICD-10-CM
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
## 2023-02-24 PROCEDURE — 72170 X-RAY EXAM OF PELVIS: CPT

## 2023-02-24 PROCEDURE — 72125 CT NECK SPINE W/O DYE: CPT

## 2023-02-24 PROCEDURE — 99283 EMERGENCY DEPT VISIT LOW MDM: CPT

## 2023-02-24 PROCEDURE — 70450 CT HEAD/BRAIN W/O DYE: CPT

## 2023-02-24 RX ORDER — ACETAMINOPHEN 325 MG/1
650 TABLET ORAL ONCE
Status: COMPLETED | OUTPATIENT
Start: 2023-02-24 | End: 2023-02-24

## 2023-02-24 RX ORDER — QUETIAPINE FUMARATE 25 MG/1
12.5 TABLET, FILM COATED ORAL NIGHTLY
COMMUNITY

## 2023-02-24 RX ORDER — CARVEDILOL 6.25 MG/1
6.25 TABLET ORAL 2 TIMES DAILY WITH MEALS
COMMUNITY

## 2023-02-24 RX ORDER — ATORVASTATIN CALCIUM 40 MG/1
40 TABLET, FILM COATED ORAL DAILY
COMMUNITY

## 2023-02-24 RX ORDER — ASPIRIN 81 MG/1
81 TABLET ORAL DAILY
COMMUNITY

## 2023-02-24 RX ADMIN — ACETAMINOPHEN 650 MG: 325 TABLET, FILM COATED ORAL at 20:26

## 2023-03-16 ENCOUNTER — APPOINTMENT (OUTPATIENT)
Dept: GENERAL RADIOLOGY | Facility: HOSPITAL | Age: 78
End: 2023-03-16
Payer: MEDICARE

## 2023-03-16 ENCOUNTER — APPOINTMENT (OUTPATIENT)
Dept: CT IMAGING | Facility: HOSPITAL | Age: 78
End: 2023-03-16
Payer: MEDICARE

## 2023-03-16 ENCOUNTER — HOSPITAL ENCOUNTER (EMERGENCY)
Facility: HOSPITAL | Age: 78
Discharge: NURSING, REHAB OR LONG-TERM CARE | End: 2023-03-16
Attending: EMERGENCY MEDICINE | Admitting: EMERGENCY MEDICINE
Payer: MEDICARE

## 2023-03-16 VITALS
DIASTOLIC BLOOD PRESSURE: 69 MMHG | TEMPERATURE: 97.9 F | BODY MASS INDEX: 14.63 KG/M2 | HEART RATE: 70 BPM | RESPIRATION RATE: 18 BRPM | HEIGHT: 66 IN | SYSTOLIC BLOOD PRESSURE: 146 MMHG | WEIGHT: 91 LBS | OXYGEN SATURATION: 98 %

## 2023-03-16 DIAGNOSIS — M25.559 HIP PAIN: ICD-10-CM

## 2023-03-16 DIAGNOSIS — S09.90XA INJURY OF HEAD, INITIAL ENCOUNTER: ICD-10-CM

## 2023-03-16 DIAGNOSIS — R10.9 ABDOMINAL PAIN, UNSPECIFIED ABDOMINAL LOCATION: ICD-10-CM
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
## 2023-03-16 LAB
ALBUMIN SERPL-MCNC: 3.8 G/DL (ref 3.5–5.2)
ALBUMIN/GLOB SERPL: 1.7 G/DL
ALP SERPL-CCNC: 55 U/L (ref 39–117)
ALT SERPL W P-5'-P-CCNC: 12 U/L (ref 1–33)
ANION GAP SERPL CALCULATED.3IONS-SCNC: 5 MMOL/L (ref 5–15)
AST SERPL-CCNC: 17 U/L (ref 1–32)
BASOPHILS # BLD AUTO: 0 10*3/MM3 (ref 0–0.2)
BASOPHILS NFR BLD AUTO: 0.6 % (ref 0–1.5)
BILIRUB SERPL-MCNC: 0.3 MG/DL (ref 0–1.2)
BILIRUB UR QL STRIP: NEGATIVE
BUN SERPL-MCNC: 32 MG/DL (ref 8–23)
BUN/CREAT SERPL: 47.8 (ref 7–25)
CALCIUM SPEC-SCNC: 8.3 MG/DL (ref 8.6–10.5)
CHLORIDE SERPL-SCNC: 106 MMOL/L (ref 98–107)
CLARITY UR: CLEAR
CO2 SERPL-SCNC: 31 MMOL/L (ref 22–29)
COLOR UR: YELLOW
CREAT SERPL-MCNC: 0.67 MG/DL (ref 0.57–1)
DEPRECATED RDW RBC AUTO: 45.9 FL (ref 37–54)
EGFRCR SERPLBLD CKD-EPI 2021: 90.2 ML/MIN/1.73
EOSINOPHIL # BLD AUTO: 0.1 10*3/MM3 (ref 0–0.4)
EOSINOPHIL NFR BLD AUTO: 1.9 % (ref 0.3–6.2)
ERYTHROCYTE [DISTWIDTH] IN BLOOD BY AUTOMATED COUNT: 15.6 % (ref 12.3–15.4)
GLOBULIN UR ELPH-MCNC: 2.2 GM/DL
GLUCOSE SERPL-MCNC: 94 MG/DL (ref 65–99)
GLUCOSE UR STRIP-MCNC: NEGATIVE MG/DL
HCT VFR BLD AUTO: 31.2 % (ref 34–46.6)
HGB BLD-MCNC: 10.1 G/DL (ref 12–15.9)
HGB UR QL STRIP.AUTO: NEGATIVE
KETONES UR QL STRIP: NEGATIVE
LEUKOCYTE ESTERASE UR QL STRIP.AUTO: NEGATIVE
LIPASE SERPL-CCNC: 60 U/L (ref 13–60)
LYMPHOCYTES # BLD AUTO: 1.6 10*3/MM3 (ref 0.7–3.1)
LYMPHOCYTES NFR BLD AUTO: 36.6 % (ref 19.6–45.3)
MCH RBC QN AUTO: 27.4 PG (ref 26.6–33)
MCHC RBC AUTO-ENTMCNC: 32.2 G/DL (ref 31.5–35.7)
MCV RBC AUTO: 85.1 FL (ref 79–97)
MONOCYTES # BLD AUTO: 0.4 10*3/MM3 (ref 0.1–0.9)
MONOCYTES NFR BLD AUTO: 9.9 % (ref 5–12)
NEUTROPHILS NFR BLD AUTO: 2.3 10*3/MM3 (ref 1.7–7)
NEUTROPHILS NFR BLD AUTO: 51 % (ref 42.7–76)
NITRITE UR QL STRIP: NEGATIVE
NRBC BLD AUTO-RTO: 0 /100 WBC (ref 0–0.2)
PH UR STRIP.AUTO: 7.5 [PH] (ref 5–8)
PLATELET # BLD AUTO: 181 10*3/MM3 (ref 140–450)
PMV BLD AUTO: 7.4 FL (ref 6–12)
POTASSIUM SERPL-SCNC: 4.2 MMOL/L (ref 3.5–5.2)
PROT SERPL-MCNC: 6 G/DL (ref 6–8.5)
PROT UR QL STRIP: NEGATIVE
RBC # BLD AUTO: 3.67 10*6/MM3 (ref 3.77–5.28)
SODIUM SERPL-SCNC: 142 MMOL/L (ref 136–145)
SP GR UR STRIP: 1.01 (ref 1–1.03)
UROBILINOGEN UR QL STRIP: NORMAL
VALPROATE SERPL-MCNC: 14.8 MCG/ML (ref 50–125)
WBC NRBC COR # BLD: 4.4 10*3/MM3 (ref 3.4–10.8)

## 2023-03-16 PROCEDURE — 80053 COMPREHEN METABOLIC PANEL: CPT | Performed by: EMERGENCY MEDICINE

## 2023-03-16 PROCEDURE — 85025 COMPLETE CBC W/AUTO DIFF WBC: CPT | Performed by: EMERGENCY MEDICINE

## 2023-03-16 PROCEDURE — 73552 X-RAY EXAM OF FEMUR 2/>: CPT

## 2023-03-16 PROCEDURE — 25510000001 IOPAMIDOL PER 1 ML: Performed by: EMERGENCY MEDICINE

## 2023-03-16 PROCEDURE — 70450 CT HEAD/BRAIN W/O DYE: CPT

## 2023-03-16 PROCEDURE — 81003 URINALYSIS AUTO W/O SCOPE: CPT | Performed by: EMERGENCY MEDICINE

## 2023-03-16 PROCEDURE — 74177 CT ABD & PELVIS W/CONTRAST: CPT

## 2023-03-16 PROCEDURE — P9612 CATHETERIZE FOR URINE SPEC: HCPCS

## 2023-03-16 PROCEDURE — 83690 ASSAY OF LIPASE: CPT | Performed by: EMERGENCY MEDICINE

## 2023-03-16 PROCEDURE — 99284 EMERGENCY DEPT VISIT MOD MDM: CPT

## 2023-03-16 PROCEDURE — 80164 ASSAY DIPROPYLACETIC ACD TOT: CPT | Performed by: EMERGENCY MEDICINE

## 2023-03-16 PROCEDURE — 72125 CT NECK SPINE W/O DYE: CPT

## 2023-03-16 RX ORDER — SODIUM CHLORIDE 0.9 % (FLUSH) 0.9 %
10 SYRINGE (ML) INJECTION AS NEEDED
Status: DISCONTINUED | OUTPATIENT
Start: 2023-03-16 | End: 2023-03-16 | Stop reason: HOSPADM

## 2023-03-16 RX ORDER — ACETAMINOPHEN 500 MG
1000 TABLET ORAL ONCE
Status: COMPLETED | OUTPATIENT
Start: 2023-03-16 | End: 2023-03-16

## 2023-03-16 RX ADMIN — ACETAMINOPHEN 1000 MG: 500 TABLET, FILM COATED ORAL at 08:28

## 2023-03-16 RX ADMIN — SODIUM CHLORIDE 1000 ML: 9 INJECTION, SOLUTION INTRAVENOUS at 09:57

## 2023-03-16 RX ADMIN — IOPAMIDOL 100 ML: 755 INJECTION, SOLUTION INTRAVENOUS at 10:50

## 2023-03-16 NOTE — ED PROVIDER NOTES
"Subjective   History of Present Illness  Chief complaint: Patient is a very pleasant 77-year-old.  She was a witnessed acute onset fall from her facility.  She has a history of dementia.  She when asked where she hurts she states \"all over\".  This was not a syncopal event per report she was a witnessed fall.  She complains of pain in her abdomen and pelvis and both femurs.  It was noted that she did strike her head.  She said her head hurts \"everywhere\".  She does deny chest pain and back pain.    Context:    Duration: Just prior to arrival    Timing: Sudden onset    Severity: Patient not quantify    Associated Symptoms:        PCP:  LMP:        Review of Systems   Unable to perform ROS: Dementia   Respiratory: Negative for shortness of breath.    Cardiovascular: Negative for chest pain.   Gastrointestinal: Positive for abdominal pain.   Musculoskeletal: Positive for arthralgias and myalgias.   Neurological: Positive for headaches.       Past Medical History:   Diagnosis Date   • Age-related osteoporosis without current pathological fracture    • Constipation, unspecified    • Dementia (HCC)    • Encephalopathy, unspecified 02/20/2023   • Essential (primary) hypertension    • Gastroesophageal reflux disease without esophagitis    • Hyperlipidemia    • Hypertension    • Other seasonal allergic rhinitis    • Transient cerebral ischemic attack    • Unspecified asthma, uncomplicated    • Unspecified dementia, unspecified severity, without behavioral disturbance, psychotic disturbance, mood disturbance, and anxiety (HCC)    • Unspecified severe protein-calorie malnutrition (HCC)        Allergies   Allergen Reactions   • Penicillins Hives       Past Surgical History:   Procedure Laterality Date   • TOE SURGERY         Family History   Problem Relation Age of Onset   • Heart disease Mother    • Diabetes Mother    • Heart disease Father        Social History     Socioeconomic History   • Marital status:    Tobacco " Use   • Smoking status: Never   Vaping Use   • Vaping Use: Never used   Substance and Sexual Activity   • Alcohol use: Not Currently   • Drug use: Never   • Sexual activity: Defer           Objective   Physical Exam  Vitals and nursing note reviewed.   Constitutional:       Appearance: Normal appearance.   HENT:      Head: Normocephalic.        Comments: No open wound but tender to palpate across the back of the head.  No deformity or step-off  Eyes:      Extraocular Movements: Extraocular movements intact.      Pupils: Pupils are equal, round, and reactive to light.   Neck:     Cardiovascular:      Rate and Rhythm: Normal rate.   Abdominal:      General: Abdomen is flat.      Tenderness: There is generalized abdominal tenderness. There is no rebound.       Musculoskeletal:      Cervical back: Tenderness present. Spinous process tenderness and muscular tenderness present.        Legs:       Comments: Has tenderness in femur both lower extremities.   Skin:     General: Skin is warm and dry.   Neurological:      General: No focal deficit present.      Mental Status: She is alert. She is disoriented.      Cranial Nerves: No cranial nerve deficit.      Sensory: No sensory deficit.         Procedures           ED Course      Results for orders placed or performed during the hospital encounter of 03/16/23   Comprehensive Metabolic Panel    Specimen: Blood   Result Value Ref Range    Glucose 94 65 - 99 mg/dL    BUN 32 (H) 8 - 23 mg/dL    Creatinine 0.67 0.57 - 1.00 mg/dL    Sodium 142 136 - 145 mmol/L    Potassium 4.2 3.5 - 5.2 mmol/L    Chloride 106 98 - 107 mmol/L    CO2 31.0 (H) 22.0 - 29.0 mmol/L    Calcium 8.3 (L) 8.6 - 10.5 mg/dL    Total Protein 6.0 6.0 - 8.5 g/dL    Albumin 3.8 3.5 - 5.2 g/dL    ALT (SGPT) 12 1 - 33 U/L    AST (SGOT) 17 1 - 32 U/L    Alkaline Phosphatase 55 39 - 117 U/L    Total Bilirubin 0.3 0.0 - 1.2 mg/dL    Globulin 2.2 gm/dL    A/G Ratio 1.7 g/dL    BUN/Creatinine Ratio 47.8 (H) 7.0 - 25.0     Anion Gap 5.0 5.0 - 15.0 mmol/L    eGFR 90.2 >60.0 mL/min/1.73   Lipase    Specimen: Blood   Result Value Ref Range    Lipase 60 13 - 60 U/L   Urinalysis With Culture If Indicated - Urine, Catheter    Specimen: Urine, Catheter   Result Value Ref Range    Color, UA Yellow Yellow, Straw    Appearance, UA Clear Clear    pH, UA 7.5 5.0 - 8.0    Specific Gravity, UA 1.015 1.005 - 1.030    Glucose, UA Negative Negative    Ketones, UA Negative Negative    Bilirubin, UA Negative Negative    Blood, UA Negative Negative    Protein, UA Negative Negative    Leuk Esterase, UA Negative Negative    Nitrite, UA Negative Negative    Urobilinogen, UA 1.0 E.U./dL 0.2 - 1.0 E.U./dL   Valproic Acid Level, Total    Specimen: Blood   Result Value Ref Range    Valproic Acid 14.8 (L) 50.0 - 125.0 mcg/mL   CBC Auto Differential    Specimen: Blood   Result Value Ref Range    WBC 4.40 3.40 - 10.80 10*3/mm3    RBC 3.67 (L) 3.77 - 5.28 10*6/mm3    Hemoglobin 10.1 (L) 12.0 - 15.9 g/dL    Hematocrit 31.2 (L) 34.0 - 46.6 %    MCV 85.1 79.0 - 97.0 fL    MCH 27.4 26.6 - 33.0 pg    MCHC 32.2 31.5 - 35.7 g/dL    RDW 15.6 (H) 12.3 - 15.4 %    RDW-SD 45.9 37.0 - 54.0 fl    MPV 7.4 6.0 - 12.0 fL    Platelets 181 140 - 450 10*3/mm3    Neutrophil % 51.0 42.7 - 76.0 %    Lymphocyte % 36.6 19.6 - 45.3 %    Monocyte % 9.9 5.0 - 12.0 %    Eosinophil % 1.9 0.3 - 6.2 %    Basophil % 0.6 0.0 - 1.5 %    Neutrophils, Absolute 2.30 1.70 - 7.00 10*3/mm3    Lymphocytes, Absolute 1.60 0.70 - 3.10 10*3/mm3    Monocytes, Absolute 0.40 0.10 - 0.90 10*3/mm3    Eosinophils, Absolute 0.10 0.00 - 0.40 10*3/mm3    Basophils, Absolute 0.00 0.00 - 0.20 10*3/mm3    nRBC 0.0 0.0 - 0.2 /100 WBC              CT Head Without Contrast    Result Date: 3/16/2023  Impression: 1. No intracranial hemorrhage or acute intracranial abnormality. 2. Generalized cerebral atrophy with advanced white matter findings of chronic microvascular disease. 3. Additional chronic findings above.  Electronically Signed: Carl Clemente  3/16/2023 10:56 AM EDT  Workstation ID: [id number]    CT Cervical Spine Without Contrast    Result Date: 3/16/2023  Impression: 1. Negative for cervical spine fracture. 2. Cervical degenerative findings above. Electronically Signed: Carl Clemente  3/16/2023 11:05 AM EDT  Workstation ID: [id number]    CT Abdomen Pelvis With Contrast    Result Date: 3/16/2023  Impression: 1. No acute findings in the abdomen or pelvis. 2. Cholecystectomy with mild extrahepatic biliary dilatation. 3. 3 mm nonobstructing upper pole right renal stone. Electronically Signed: Derrick Chavez  3/16/2023 11:01 AM EDT  Workstation ID: [id number]    XR Femur 2 View Bilateral    Result Date: 3/16/2023  Impression: Left femur: Negative for fracture. Right femur: Negative for fracture. Electronically Signed: Carl Clemente  3/16/2023 9:07 AM EDT  Workstation ID: [id number]                                 Medical Decision Making  Patient was seen and evaluated post her witnessed mechanical fall    Differential diagnosis includes but is not limited to intracerebral hemorrhage, subdural hematoma, skull fracture, cervical fracture, splenic or liver laceration/rupture, hip fracture    Patient does have a history of dementia cannot really provide significant mount of history.  On exam she was tender across her abdomen.  Both hips had some discomfort with range of motion.  She had no tenderness over her spine.  CT scans and x-rays were obtained.  X-rays show no fracture of the femur or hip.  Her CT scan of the brain shows no intracerebral hemorrhage or skull fracture.  Her CT scan cervical spine shows no cervical fracture.  Her CT abdomen shows no signs of any intra-abdominal trauma or hemorrhage.  No splenic or liver laceration noted.  At this point time patient's vital signs have remained stable although mildly hypertensive.  Will discharge to follow-up outpatient return for worsening symptoms signs or concerns.  Her Depakote level  was mildly decreased, however it appears as though she is on this for more of a psychiatric reason.  There is no history of seizures listed.    Abdominal pain, unspecified abdominal location: acute illness or injury  Fall, initial encounter: chronic illness or injury  Hip pain: acute illness or injury  Injury of head, initial encounter: acute illness or injury  Amount and/or Complexity of Data Reviewed  Labs: ordered. Decision-making details documented in ED Course.     Details: Reviewed labs.  Hemoglobin 10.1.  Urinalysis negative.  Depakote 14.8 creatinine 0.67  Radiology: ordered and independent interpretation performed. Decision-making details documented in ED Course.     Details: No signs of cervical fracture or intracerebral hemorrhage no traumatic intra-abdominal findings.  No hip fracture      Risk  OTC drugs.  Prescription drug management.          Final diagnoses:   None     Head injury  Bilateral hip pain  Abdominal pain  Fall  ED Disposition  ED Disposition     None          No follow-up provider specified.       Medication List      No changes were made to your prescriptions during this visit.          Catracho Norwood,   03/16/23 [id number]

## 2023-05-02 ENCOUNTER — HOSPITAL ENCOUNTER (EMERGENCY)
Facility: HOSPITAL | Age: 78
Discharge: HOME | End: 2023-05-03
Attending: EMERGENCY MEDICINE
Payer: MEDICARE

## 2023-05-02 ENCOUNTER — APPOINTMENT (OUTPATIENT)
Dept: CT IMAGING | Facility: HOSPITAL | Age: 78
End: 2023-05-02
Payer: MEDICARE

## 2023-05-02 VITALS
BODY MASS INDEX: 14.63 KG/M2 | DIASTOLIC BLOOD PRESSURE: 70 MMHG | HEART RATE: 78 BPM | OXYGEN SATURATION: 99 % | WEIGHT: 91 LBS | HEIGHT: 66 IN | SYSTOLIC BLOOD PRESSURE: 169 MMHG | RESPIRATION RATE: 18 BRPM | TEMPERATURE: 99.1 F

## 2023-05-02 DIAGNOSIS — S09.90XA MINOR CLOSED HEAD INJURY: ICD-10-CM

## 2023-05-02 DIAGNOSIS — S00.83XA CONTUSION OF FACE, INITIAL ENCOUNTER: ICD-10-CM
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
## 2023-05-02 DIAGNOSIS — S01.111A EYEBROW LACERATION, RIGHT, INITIAL ENCOUNTER: ICD-10-CM

## 2023-05-02 LAB
ALBUMIN SERPL-MCNC: 4.1 G/DL (ref 3.5–5.2)
ALBUMIN/GLOB SERPL: 1.5 G/DL
ALP SERPL-CCNC: 94 U/L (ref 39–117)
ALT SERPL W P-5'-P-CCNC: 14 U/L (ref 1–33)
ANION GAP SERPL CALCULATED.3IONS-SCNC: 9 MMOL/L (ref 5–15)
AST SERPL-CCNC: 22 U/L (ref 1–32)
BASOPHILS # BLD AUTO: 0 10*3/MM3 (ref 0–0.2)
BASOPHILS NFR BLD AUTO: 0.6 % (ref 0–1.5)
BILIRUB SERPL-MCNC: 0.2 MG/DL (ref 0–1.2)
BUN SERPL-MCNC: 37 MG/DL (ref 8–23)
BUN/CREAT SERPL: 34.3 (ref 7–25)
CALCIUM SPEC-SCNC: 9.1 MG/DL (ref 8.6–10.5)
CHLORIDE SERPL-SCNC: 100 MMOL/L (ref 98–107)
CO2 SERPL-SCNC: 29 MMOL/L (ref 22–29)
CREAT SERPL-MCNC: 1.08 MG/DL (ref 0.57–1)
DEPRECATED RDW RBC AUTO: 41.1 FL (ref 37–54)
EGFRCR SERPLBLD CKD-EPI 2021: 53 ML/MIN/1.73
EOSINOPHIL # BLD AUTO: 0.2 10*3/MM3 (ref 0–0.4)
EOSINOPHIL NFR BLD AUTO: 2.3 % (ref 0.3–6.2)
ERYTHROCYTE [DISTWIDTH] IN BLOOD BY AUTOMATED COUNT: 14 % (ref 12.3–15.4)
GLOBULIN UR ELPH-MCNC: 2.8 GM/DL
GLUCOSE SERPL-MCNC: 100 MG/DL (ref 65–99)
HCT VFR BLD AUTO: 34.9 % (ref 34–46.6)
HGB BLD-MCNC: 11.2 G/DL (ref 12–15.9)
LYMPHOCYTES # BLD AUTO: 2.1 10*3/MM3 (ref 0.7–3.1)
LYMPHOCYTES NFR BLD AUTO: 27.5 % (ref 19.6–45.3)
MCH RBC QN AUTO: 26.8 PG (ref 26.6–33)
MCHC RBC AUTO-ENTMCNC: 32 G/DL (ref 31.5–35.7)
MCV RBC AUTO: 83.7 FL (ref 79–97)
MONOCYTES # BLD AUTO: 0.8 10*3/MM3 (ref 0.1–0.9)
MONOCYTES NFR BLD AUTO: 10.9 % (ref 5–12)
NEUTROPHILS NFR BLD AUTO: 4.5 10*3/MM3 (ref 1.7–7)
NEUTROPHILS NFR BLD AUTO: 58.7 % (ref 42.7–76)
NRBC BLD AUTO-RTO: 0 /100 WBC (ref 0–0.2)
PLATELET # BLD AUTO: 283 10*3/MM3 (ref 140–450)
PMV BLD AUTO: 7.3 FL (ref 6–12)
POTASSIUM SERPL-SCNC: 4.2 MMOL/L (ref 3.5–5.2)
PROT SERPL-MCNC: 6.9 G/DL (ref 6–8.5)
RBC # BLD AUTO: 4.17 10*6/MM3 (ref 3.77–5.28)
SODIUM SERPL-SCNC: 138 MMOL/L (ref 136–145)
VALPROATE SERPL-MCNC: 26.2 MCG/ML (ref 50–125)
WBC NRBC COR # BLD: 7.7 10*3/MM3 (ref 3.4–10.8)

## 2023-05-02 PROCEDURE — 36415 COLL VENOUS BLD VENIPUNCTURE: CPT

## 2023-05-02 PROCEDURE — 85025 COMPLETE CBC W/AUTO DIFF WBC: CPT | Performed by: NURSE PRACTITIONER

## 2023-05-02 PROCEDURE — 72125 CT NECK SPINE W/O DYE: CPT

## 2023-05-02 PROCEDURE — 70450 CT HEAD/BRAIN W/O DYE: CPT

## 2023-05-02 PROCEDURE — 80053 COMPREHEN METABOLIC PANEL: CPT | Performed by: NURSE PRACTITIONER

## 2023-05-02 PROCEDURE — 80164 ASSAY DIPROPYLACETIC ACD TOT: CPT | Performed by: NURSE PRACTITIONER

## 2023-05-02 PROCEDURE — 99283 EMERGENCY DEPT VISIT LOW MDM: CPT

## 2023-05-02 PROCEDURE — 70486 CT MAXILLOFACIAL W/O DYE: CPT

## 2023-05-02 RX ORDER — SODIUM CHLORIDE 0.9 % (FLUSH) 0.9 %
10 SYRINGE (ML) INJECTION AS NEEDED
Status: DISCONTINUED | OUTPATIENT
Start: 2023-05-02 | End: 2023-05-03 | Stop reason: HOSPADM

## 2023-05-03 NOTE — ED PROVIDER NOTES
Subjective   History of Present Illness  Patient is a 77-year-old female who had a fall at home and states she struck the right side of her face on the floor.  She is pleasantly confused which she does have a history of dementia and states that she does not know why she fell.  She denies any pain though she does have large bruising and swelling around the right eye and a small abrasion above t in the right eyebrow        Review of Systems   Constitutional: Negative for chills, fatigue and fever.   HENT: Positive for facial swelling. Negative for congestion, tinnitus and trouble swallowing.         Swelling and bruising around the right eye   Eyes: Negative for photophobia, discharge and redness.   Respiratory: Negative for cough and shortness of breath.    Cardiovascular: Negative for chest pain and palpitations.   Gastrointestinal: Negative for abdominal pain, diarrhea, nausea and vomiting.   Genitourinary: Negative for dysuria, frequency and urgency.   Musculoskeletal: Negative for back pain, joint swelling and myalgias.   Skin: Positive for wound. Negative for rash.        Small laceration to the right eyebrow   Neurological: Negative for dizziness and headaches.   Psychiatric/Behavioral: Negative for confusion.   All other systems reviewed and are negative.      Past Medical History:   Diagnosis Date   • Age-related osteoporosis without current pathological fracture    • Constipation, unspecified    • Dementia    • Encephalopathy, unspecified 02/20/2023   • Essential (primary) hypertension    • Gastroesophageal reflux disease without esophagitis    • Hyperlipidemia    • Hypertension    • Other seasonal allergic rhinitis    • Transient cerebral ischemic attack    • Unspecified asthma, uncomplicated    • Unspecified dementia, unspecified severity, without behavioral disturbance, psychotic disturbance, mood disturbance, and anxiety    • Unspecified severe protein-calorie malnutrition        Allergies   Allergen  Reactions   • Penicillins Hives       Past Surgical History:   Procedure Laterality Date   • TOE SURGERY         Family History   Problem Relation Age of Onset   • Heart disease Mother    • Diabetes Mother    • Heart disease Father        Social History     Socioeconomic History   • Marital status:    Tobacco Use   • Smoking status: Never   Vaping Use   • Vaping Use: Never used   Substance and Sexual Activity   • Alcohol use: Not Currently   • Drug use: Never   • Sexual activity: Defer           Objective   Physical Exam  Vitals reviewed.   Constitutional:       General: She is not in acute distress.     Appearance: Normal appearance. She is well-developed and normal weight. She is not toxic-appearing.   HENT:      Head: Normocephalic and atraumatic.      Jaw: There is normal jaw occlusion.        Comments: Right thigh has a large amount of soft tissue swelling and ecchymosis the extraocular movements are intact there is no bleeding to the 0.5 cm laceration in the right eyebrow  Eyes:      Conjunctiva/sclera: Conjunctivae normal.      Pupils: Pupils are equal, round, and reactive to light.   Cardiovascular:      Rate and Rhythm: Normal rate and regular rhythm.      Heart sounds: Normal heart sounds.   Pulmonary:      Effort: Pulmonary effort is normal. No respiratory distress.      Breath sounds: Normal breath sounds. No wheezing.   Abdominal:      General: Bowel sounds are normal. There is no distension.      Palpations: Abdomen is soft. There is no mass.      Tenderness: There is no abdominal tenderness. There is no guarding or rebound.   Musculoskeletal:         General: No deformity. Normal range of motion.      Cervical back: Normal range of motion and neck supple.   Skin:     General: Skin is warm and dry.      Capillary Refill: Capillary refill takes less than 2 seconds.   Neurological:      General: No focal deficit present.      Mental Status: She is alert. Mental status is at baseline. She is  "confused.      GCS: GCS eye subscore is 4. GCS verbal subscore is 4. GCS motor subscore is 6.      Cranial Nerves: No cranial nerve deficit.      Sensory: No sensory deficit.      Deep Tendon Reflexes: Reflexes normal.   Psychiatric:         Attention and Perception: Attention normal.         Mood and Affect: Mood is depressed. Affect is flat.         Speech: Speech normal.         Behavior: Behavior normal. Behavior is cooperative.         Procedures           ED Course  ED Course as of 05/03/23 0026   Tue May 02, 2023   9882 Waiting for the CTs to be read [KW]      ED Course User Index  [KW] Charmaine Stahl, APRN      /70   Pulse 78   Temp 99.1 °F (37.3 °C)   Resp 18   Ht 167.6 cm (66\")   Wt 41.3 kg (91 lb)   SpO2 99%   BMI 14.69 kg/m²   Labs Reviewed   VALPROIC ACID LEVEL, TOTAL - Abnormal; Notable for the following components:       Result Value    Valproic Acid 26.2 (*)     All other components within normal limits    Narrative:     Therapeutic Ranges for Valproic Acid    Epilepsy:       mcg/ml  Bipolar/Payton  up to 125 mcg/ml     COMPREHENSIVE METABOLIC PANEL - Abnormal; Notable for the following components:    Glucose 100 (*)     BUN 37 (*)     Creatinine 1.08 (*)     BUN/Creatinine Ratio 34.3 (*)     eGFR 53.0 (*)     All other components within normal limits    Narrative:     GFR Normal >60  Chronic Kidney Disease <60  Kidney Failure <15    The GFR formula is only valid for adults with stable renal function between ages 18 and 70.   CBC WITH AUTO DIFFERENTIAL - Abnormal; Notable for the following components:    Hemoglobin 11.2 (*)     All other components within normal limits   CBC AND DIFFERENTIAL    Narrative:     The following orders were created for panel order CBC & Differential.  Procedure                               Abnormality         Status                     ---------                               -----------         ------                     CBC Auto " Differential[[id number]]        Abnormal            Final result                 Please view results for these tests on the individual orders.     Medications   sodium chloride 0.9 % flush 10 mL (has no administration in time range)     CT Head Without Contrast    Result Date: 5/3/2023  1. Mild right periorbital soft tissue swelling and moderate right lateral facial soft tissue swelling. 2. Moderate left maxillary sinus mucosal thickening. 3. No acute intracranial abnormality. 4. Moderate volume loss and chronic microvascular ischemic changes. Arteriosclerosis. Electronically signed by:  Tyler Bhat M.D.  5/2/2023 10:03 PM Mountain Time    CT Cervical Spine Without Contrast    Result Date: 5/3/2023  1.  No acute fracture or traumatic malalignment in the cervical spine. 2.  Cervical spondylosis primarily at C5-6 and C6-7. Electronically signed by:  Ubaldo Simental M.D.  5/2/2023 10:09 PM Mountain Time    CT Facial Bones Without Contrast    Result Date: 5/3/2023  1.  No acute facial fracture. 2.  Right periorbital soft tissue injuries/hematoma without underlying fracture or retrobulbar hematoma. 3.  Advanced left maxillary sinus disease with evidence of prior sinus surgery. Electronically signed by:  Ubaldo Simental M.D.  5/2/2023 10:03 PM Mountain Time                                         Medical Decision Making  Patient is a 77-year-old female who comes in after a fall at home she does have a heavy history of dementia and she is a poor historian she has bruising and swelling around the right eye and a small laceration which is concerning for intracranial hemorrhage sprain or neck fracture or dislocation and facial bone fractures.  The patient had above exam and was found to be up-to-date on her tetanus-she had CT facial bones-head and neck which showed no acute findings.  CBC and chemistry were obtained the patient was found to have very minor renal insufficiency with a BUN of 37 and a creatinine of  1.081-month ago it was 32 and 0.67- the wound was cleaned above the right eye and Dermabond was applied the patient and family were given wound care instructions as well as head injury precautions and the patient was discharged home.        Contusion of face, initial encounter: complicated acute illness or injury  Eyebrow laceration, right, initial encounter: complicated acute illness or injury  Fall, initial encounter: complicated acute illness or injury  Minor closed head injury: complicated acute illness or injury  Amount and/or Complexity of Data Reviewed  Labs: ordered. Decision-making details documented in ED Course.  Radiology: ordered and independent interpretation performed. Decision-making details documented in ED Course.      Risk  Prescription drug management.          Final diagnoses:   Fall, initial encounter   Contusion of face, initial encounter   Eyebrow laceration, right, initial encounter   Minor closed head injury       ED Disposition  ED Disposition     ED Disposition   Discharge    Condition   Stable    Comment   --             Derrick Mg MD  [address] [facility] [place] [address]  [phone]    In 3 days  If symptoms worsen, As needed         Medication List      Changed    amLODIPine 10 MG tablet  Commonly known as: NORVASC  Take 1 tablet by mouth Daily.  What changed: how much to take     memantine 5 MG tablet  Commonly known as: NAMENDA  Take 1 tablet by mouth Daily.  What changed: how much to take             Charmaine Stahl, TSERING  05/03/23 0025       Charmaine Stahl, APRRENARD  05/03/23 0026

## 2023-05-03 NOTE — DISCHARGE INSTRUCTIONS
Follow head injury precautions and return the patient to the emergency room for any change in behavior profuse vomiting.    Keep the wound clean dry and the wound adhesive will come off on its own in about 10 days    Return for any signs of infection  Have the patient reseen by the primary care provider in 3 days if not improved